# Patient Record
Sex: FEMALE | Race: WHITE | HISPANIC OR LATINO | Employment: FULL TIME | URBAN - METROPOLITAN AREA
[De-identification: names, ages, dates, MRNs, and addresses within clinical notes are randomized per-mention and may not be internally consistent; named-entity substitution may affect disease eponyms.]

---

## 2020-09-04 ENCOUNTER — LAB (OUTPATIENT)
Dept: LAB | Facility: HOSPITAL | Age: 60
End: 2020-09-04

## 2020-09-04 ENCOUNTER — OFFICE VISIT (OUTPATIENT)
Dept: FAMILY MEDICINE CLINIC | Facility: CLINIC | Age: 60
End: 2020-09-04

## 2020-09-04 VITALS
HEIGHT: 63 IN | TEMPERATURE: 97.1 F | WEIGHT: 167 LBS | DIASTOLIC BLOOD PRESSURE: 80 MMHG | BODY MASS INDEX: 29.59 KG/M2 | OXYGEN SATURATION: 98 % | SYSTOLIC BLOOD PRESSURE: 120 MMHG | HEART RATE: 78 BPM

## 2020-09-04 DIAGNOSIS — Z23 NEED FOR IMMUNIZATION AGAINST INFLUENZA: ICD-10-CM

## 2020-09-04 DIAGNOSIS — E66.3 OVERWEIGHT: ICD-10-CM

## 2020-09-04 DIAGNOSIS — I10 ESSENTIAL HYPERTENSION: Primary | ICD-10-CM

## 2020-09-04 LAB
ANION GAP SERPL CALCULATED.3IONS-SCNC: 10.1 MMOL/L (ref 5–15)
BUN SERPL-MCNC: 13 MG/DL (ref 6–20)
BUN/CREAT SERPL: 18.8 (ref 7–25)
CALCIUM SPEC-SCNC: 9.6 MG/DL (ref 8.6–10.5)
CHLORIDE SERPL-SCNC: 105 MMOL/L (ref 98–107)
CO2 SERPL-SCNC: 27.9 MMOL/L (ref 22–29)
CREAT SERPL-MCNC: 0.69 MG/DL (ref 0.57–1)
GFR SERPL CREATININE-BSD FRML MDRD: 87 ML/MIN/1.73
GLUCOSE SERPL-MCNC: 95 MG/DL (ref 65–99)
POTASSIUM SERPL-SCNC: 4.3 MMOL/L (ref 3.5–5.2)
SODIUM SERPL-SCNC: 143 MMOL/L (ref 136–145)

## 2020-09-04 PROCEDURE — 90471 IMMUNIZATION ADMIN: CPT | Performed by: NURSE PRACTITIONER

## 2020-09-04 PROCEDURE — 90715 TDAP VACCINE 7 YRS/> IM: CPT | Performed by: NURSE PRACTITIONER

## 2020-09-04 PROCEDURE — 80048 BASIC METABOLIC PNL TOTAL CA: CPT | Performed by: NURSE PRACTITIONER

## 2020-09-04 PROCEDURE — 99203 OFFICE O/P NEW LOW 30 MIN: CPT | Performed by: NURSE PRACTITIONER

## 2020-09-04 PROCEDURE — 90472 IMMUNIZATION ADMIN EACH ADD: CPT | Performed by: NURSE PRACTITIONER

## 2020-09-04 PROCEDURE — 90686 IIV4 VACC NO PRSV 0.5 ML IM: CPT | Performed by: NURSE PRACTITIONER

## 2020-09-04 RX ORDER — AMLODIPINE BESYLATE 5 MG/1
5 TABLET ORAL DAILY
Qty: 90 TABLET | Refills: 1 | Status: SHIPPED | OUTPATIENT
Start: 2020-09-04 | End: 2020-11-23

## 2020-09-04 RX ORDER — VITAMIN E 268 MG
400 CAPSULE ORAL DAILY
COMMUNITY

## 2020-09-04 RX ORDER — ACETAMINOPHEN 160 MG
2000 TABLET,DISINTEGRATING ORAL DAILY
COMMUNITY

## 2020-09-04 RX ORDER — DIPHENHYDRAMINE HCL 25 MG
25 TABLET ORAL EVERY 6 HOURS PRN
COMMUNITY
End: 2022-04-15

## 2020-09-04 RX ORDER — AMLODIPINE BESYLATE 5 MG/1
5 TABLET ORAL DAILY
COMMUNITY
End: 2020-09-04 | Stop reason: SDUPTHER

## 2020-09-04 RX ORDER — MULTIVITAMIN WITH IRON
TABLET ORAL
COMMUNITY

## 2020-09-04 NOTE — PATIENT INSTRUCTIONS
" (  Plan de alimentación DASH  DASH Eating Plan  DASH es la sigla en inglés de \"Enfoques Alimentarios para Detener la Hipertensión\" (Dietary Approaches to Stop Hypertension). El plan de alimentación DASH ha demostrado bajar la presión arterial elevada (hipertensión). También puede reducir el riesgo de diabetes tipo 2, enfermedad cardíaca y accidente cerebrovascular. Lalitha plan también puede ayudar a adelgazar.  Consejos para seguir lalitha plan    Pautas generales  · Evite ingerir más de 2,300 mg (miligramos) de sal (sodio) por día. Si tiene hipertensión, es posible que necesite reducir la ingesta de sodio a 1,500 mg por día.  · Limite el consumo de alcohol a no más de 1 medida por día si es tremaine y no está embarazada, y 2 medidas por día si es hombre. Judi medida equivale a 12 oz (355 ml) de cerveza, 5 oz (148 ml) de vino o 1½ oz (44 ml) de bebidas alcohólicas de puma graduación.  · Trabaje con hull médico para mantener un peso saludable o perder peso. Pregúntele cuál es el peso recomendado para usted.  · Realice al menos 30 minutos de ejercicio que vivian que se acelere hull corazón (ejercicio aeróbico) la mayoría de los días de la semana. Estas actividades pueden incluir caminar, nadar o andar en bicicleta.  · Trabaje con hull médico o especialista en alimentación y nutrición (nutricionista) para ajustar hull plan alimentario a brendan necesidades calóricas personales.  Lectura de las etiquetas de los alimentos    · Verifique en las etiquetas de los alimentos, la cantidad de sodio por porción. Elija alimentos con menos del 5 por ciento del valor diario de sodio. Generalmente, los alimentos con menos de 300 mg de sodio por porción se encuadran dentro de lalitha plan alimentario.  · Para encontrar cereales integrales, busque la palabra \"integral\" rigo primera palabra en la lista de ingredientes.  De compras  · Compre productos en los que en hull etiqueta diga: “bajo contenido de sodio” o “sin agregado de sal”.  · Compre alimentos frescos. " Evite los alimentos enlatados y comidas precocidas o congeladas.  Cocción  · Evite agregar sal cuando cocine. Use hierbas o aderezos sin sal, en lugar de sal de pritchett o sal eddie. Consulte al médico o farmacéutico antes de usar sustitutos de la sal.  · No fría los alimentos. A la hora de cocinar los alimentos opte por hornearlos, hervirlos, grillarlos y asarlos a la forrest.  · Cocine con aceites cardiosaludables, rigo lipscomb, canola, soja o girasol.  Planificación de las comidas  · Consuma dayna dieta equilibrada, que incluya lo siguiente:  ? 5 o más porciones de frutas y verduras por día. Trate de que la mitad del plato de cada comida zonia frutas y verduras.  ? Hasta 6 u 8 porciones de cereales integrales por día.  ? Menos de 6 onzas de carne, aves o pescado magros por día. Dayna porción de 3 onzas de carne tiene sharla el mismo tamaño que un mahamed de cartas. Un huevo equivale a 1 onza.  ? Dos porciones de productos lácteos descremados por día.  ? Dayna porción de rosita secos, semillas o frijoles 5 veces por semana.  ? Grasas cardiosaludables. Las grasas saludables llamadas ácidos grasos omega-3 se encuentran en alimentos rigo semillas de yanelis y pescados de agua fría, rigo por ejemplo, alex, salmón y caballa.  · Limite la cantidad que ingiere de los siguientes alimentos:  ? Alimentos enlatados o envasados.  ? Alimentos con alto contenido de grasa trans, rigo alimentos fritos.  ? Alimentos con alto contenido de grasa saturada, rigo carne con grasa.  ? Dulces, postres, bebidas azucaradas y otros alimentos con azúcar agregada.  ? Productos lácteos enteros.  · No le agregue sal a los alimentos antes de probarlos.  · Trate de comer al menos 2 comidas vegetarianas por semana.  · Consuma más comida casera y menos de restaurante, de bufés y comida rápida.  · Cuando coma en un restaurante, pida que preparen hull comida con menos sal o, en lo posible, sin nada de sal.  ¿Qué alimentos se recomiendan?  Los alimentos enumerados a  continuación no constituyen dayna lista completa. Hable con el nutricionista sobre las mejores opciones alimenticias para usted.  Cereales  Pan de salvado o integral. Pasta de salvado o integral. Arroz integral. Jakob. Quinua. Pito burgol. Cereales integrales y con bajo contenido de sodio. Pan shana. Galletitas de agua con bajo contenido de grasa y sodio. Tortillas de harina integral.  Verduras  Verduras frescas o congeladas (crudas, al vapor, asadas o grilladas). Jugos de tomate y verduras con bajo contenido de sodio o reducidos en sodio. Salsa y pasta de tomate con bajo contenido de sodio o reducidas en sodio. Verduras enlatadas con bajo contenido de sodio o reducidas en sodio.  Frutas  Todas las frutas frescas, congeladas o disecadas. Frutas enlatadas en jugo natural (sin agregado de azúcar).  Carne y otros alimentos proteicos  Dariusz o pavo sin piel. Carne de dariusz o de pavo molida. Cerdo desgrasado. Pescado y mariscos. Claras de huevo. Porotos, guisantes o lentejas secos. Rosita secos, mantequilla de rosita secos y semillas sin sal. Frijoles enlatados sin sal. Vizcaino de carne vacuna magra, desgrasada. Embutidos magros, con bajo contenido de sodio.  Lácteos  Leche descremada (1 %) o descremada. Quesos sin grasa, con bajo contenido de grasa o descremados. Queso conner o ricota sin grasa, con bajo contenido de sodio. Yogur semidescremado o descremado. Queso con bajo contenido de grasa y sodio.  Grasas y aceites  Margarinas untables que no contengan grasas trans. Aceite vegetal. Mayonesa y aderezos para ensaladas livianos o con bajo contenido de grasas (reducidos en sodio). Aceite de canola, cártamo, lipscomb, soja y girasol. [place].  Condimentos y otros alimentos  Hierbas. Especias. Mezclas de condimentos sin sal. Palomitas de maíz y pretzels sin sal. Dulces con bajo contenido de grasas.  ¿Qué alimentos no se recomiendan?  Los alimentos enumerados a continuación no constituyen dayna lista completa. Hable con el  nutricionista sobre las mejores opciones alimenticias para usted.  Cereales  Productos de panificación hechos con grasa, rigo medialunas, magdalenas y algunos panes. Comidas con arroz o pasta seca listas para usar.  Verduras  Verduras con crema o fritas. Verduras en salsa de queso. Verduras enlatadas regulares (que no zonia con bajo contenido de sodio o reducidas en sodio). Pasta y salsa de tomates enlatadas regulares (que no zonia con bajo contenido de sodio o reducidas en sodio). Jugos de tomate y verduras regulares (que no zonia con bajo contenido de sodio o reducidos en sodio). Pepinillos. [place].  Frutas  Fruta enlatada en almíbar liviano o espeso. Frutas cocidas en aceite. Frutas con salsa de crema o manteca.  Carne y otros alimentos proteicos  Vizcaino de carne con grasa. Costillas. Carne frita. Tocino. Salchichas. Mortadela y otras felix procesadas. Rakesh. Panceta. Perros calientes (hotdogs). Salchicha de cerdo. Jesi secos y semillas con omari. Frijoles enlatados con agregado de omari. Pescado enlatado o ahumado. Huevos enteros o yemas. Dariusz o pavo con piel.  Lácteos  Leche entera o al 2 %, crema y mitad leche y mitad crema. Queso crema entero o con toda hull grasa. Yogur entero o endulzado. Quesos con toda hull grasa. Sustitutos de cremas no lácteas. Coberturas batidas. Quesos para untar y quesos procesados.  Grasas y aceites  Mantequilla. Margarina en leonardo. [place] de cerdo. Materia grasa. Mantequilla clarificada. Grasa de panceta. Aceites tropicales rigo aceite de jeff, palmiste o sanchez.  Condimentos y otros alimentos  Palomitas de maíz y pretzels con sal. Sal de cebolla, sal de ajo, sal condimentada, sal de pritchett y sal eddie. Salsa [facility]. Salsa tártara. Salsa barbacoa. Salsa teriyaki. Salsa de soja, incluso la que tiene contenido reducido de sodio. Salsa de carne. Salsas en jenn y envasadas. Salsa de pescado. Salsa de ostras. Salsa rosada. Rábano picante envasado. [place]. [place]aza. Saborizantes y  tiernizantes para carne. Caldo en cubitos. Salsa picante y salsa tabasco. Escabeches envasados o ya preparados. Aderezos para tacos prefabricados o envasados. Salsas. Aderezos comunes para ensalada.  Dónde encontrar más información:  · Instituto Nacional del [place], los Pulmones y la [place] (National Heart, Lung, and Blood [place]): www.nhlbi.nih.gov  · Asociación Estadoun[place]ense del [place] (American Heart Association): www.heart.org  Resumen  · El plan de alimentación DASH ha demostrado bajar la presión arterial elevada (hipertensión). También puede reducir el riesgo de diabetes tipo 2, enfermedad cardíaca y accidente cerebrovascular.  · Con el plan de alimentación DASH, deberá limitar el consumo de sal (sodio) a 2,300 mg por día. Si tiene hipertensión, es posible que necesite reducir la ingesta de sodio a 1,500 mg por día.  · Cuando siga el plan de alimentación DASH, trate de comer más frutas frescas y verduras, cereales integrales, felix magras, lácteos descremados y grasas cardiosaludables.  · Trabaje con hull médico o especialista en alimentación y nutrición (nutricionista) para ajustar hull plan alimentario a brendan necesidades calóricas personales.  Esta información no tiene rigo fin reemplazar el consejo del médico. Asegúrese de hacerle al médico cualquier pregunta que tenga.  Document Released: 12/06/2012 Document Revised: 04/09/2018 Document Reviewed: 04/09/2018  Elsevier Patient Education © 2020 Elsevier Inc.    Hipertensión en los adultos  Hypertension, Adult  La presión arterial puma (hipertensión) se produce cuando la fuerza de la yolanda bombea a través de las arterias con mucha fuerza. Las arterias son los vasos sanguíneos que transportan la yolanda desde el corazón al ro del cuerpo. La hipertensión hace que el corazón vivian más esfuerzo para bombear yolanda y puede provocar que las arterias se estrechen o endurezcan. La hipertensión no tratada o no controlada puede causar infarto de miocardio,  insuficiencia cardíaca, accidente cerebrovascular, enfermedad renal y otros problemas.  Judi lectura de la presión arterial consta de un número más alto sobre un número más bajo. En condiciones ideales, la presión arterial debe estar por debajo de 120/80. El primer número (“superior”) es la presión sistólica. Es la medida de la presión de las arterias cuando el corazón late. El miquel número (“inferior”) es la presión diastólica. Es la medida de la presión en las arterias cuando el corazón se relaja.  ¿Cuáles son las causas?  Se desconoce la causa exacta de esta afección. Hay algunas afecciones que causan presión arterial puma o están relacionadas con mercedes.  ¿Qué incrementa el riesgo?  Algunos factores de riesgo de hipertensión están bajo hull control. Los siguientes factores pueden hacer que sea más propenso a desarrollar esta afección:  · Fumar.  · Tener diabetes mellitus tipo 2, colesterol alto, o ambos.  · No hacer la cantidad suficiente de actividad física o ejercicio.  · Tener sobrepeso.  · Consumir mucha grasa, azúcar, calorías o sal (sodio) en hull dieta.  · Beber alcohol en exceso.  Algunos factores de riesgo para la presión arterial puma pueden ser difíciles o imposibles de cambiar. Algunos de estos factores son los siguientes:  · Tener enfermedad renal crónica.  · Tener antecedentes familiares de presión arterial puma.  · Edad. Los riesgos aumentan con la edad.  · Rubin. El riesgo es mayor para las personas afroamericanas.  · Sexo. Antes de los 45 años, los hombres corren más riesgo que las mujeres. Después de los 65 años, las mujeres corren más riesgo que los hombres.  · Tener apnea obstructiva del sueño.  · Estrés.  ¿Cuáles son los signos o los síntomas?  Es posible que la presión arterial puma puede no cause síntomas. La presión arterial muy puma (crisis hipertensiva) puede provocar:  · Dolor de zuly.  · Ansiedad.  · Falta de aire.  · Hemorragia nasal.  · Náuseas y vómitos.  · Cambios en la  visión.  · Dolor de pecho intenso.  · Convulsiones.  ¿Cómo se diagnostica?  Esta afección se diagnostica al medir hull presión arterial mientras se encuentra sentado, con el brazo apoyado sobre dayna superficie plana, las piernas sin cruzar y los pies unique apoyados en el piso. El brazalete del tensiómetro debe colocarse directamente sobre la piel de la parte superior del brazo y al nivel de hull corazón. Debe medirla al menos dos veces en el mismo brazo. Determinadas condiciones pueden causar dayna diferencia de presión arterial entre el brazo laurel y el derecho.  Ciertos factores pueden provocar que las lecturas de la presión arterial zonia inferiores o superiores a lo normal por un período corto de tiempo:  · Si hull presión arterial es más puma cuando se encuentra en el consultorio del médico que cuando la mide en hull hogar, se denomina “hipertensión de bata mj”. La mayoría de las personas que tienen esta afección no deben ser medicadas.  · Si hull presión arterial es más puma en el hogar que cuando se encuentra en el consultorio del médico, se denomina “hipertensión enmascarada”. La mayoría de las personas que tienen esta afección deben ser medicadas para controlar la presión arterial.  Si tiene dayna lecturas de presión arterial puma dennis dayna visita o si tiene presión arterial normal con otros factores de riesgo, se le podrá pedir que vivian lo siguiente:  · Que regrese otro día para volver a controlar hull presión arterial nuevamente.  · Que se controle la presión arterial en hull casa dennis 1 semana o más.  Si se le diagnostica hipertensión, es posible que se le realicen otros análisis de yolanda o estudios de diagnóstico por imágenes para ayudar a hull médico a comprender hull riesgo general de tener otras afecciones.  ¿Cómo se trata?  Esta afección se trata haciendo cambios saludables en el estilo de veda, tales rigo ingerir alimentos saludables, realizar más ejercicio y reducir el consumo de alcohol. El médico puede  recetarle medicamentos si los cambios en el estilo de veda no son suficientes para lograr controlar la presión arterial y si:  · Hull presión arterial sistólica está por encima de 130.  · Hull presión arterial diastólica está por encima de 80.  La presión arterial deseada puede variar en función de las enfermedades, la edad y otros factores personales.  Siga estas instrucciones en hull casa:  Comida y bebida    · Siga dayna dieta con alto contenido de fibras y potasio, y con bajo contenido de sodio, azúcar agregada y grasas. Un ejemplo de plan alimenticio es la dieta DASH (Dietary Approaches to Stop Hypertension, Métodos alimenticios para detener la hipertensión). Para alimentarse de esta manera:  ? Coma mucha fruta y verdura fresca. Trate de que la mitad del plato de cada comida sea de frutas y verduras.  ? Coma cereales integrales, rigo pasta integral, arroz integral o pan integral. Llene aproximadamente un cuarto del plato con cereales integrales.  ? Coma y mohan productos lácteos con bajo contenido de grasa, rigo leche descremada o yogur bajo en grasas.  ? Evite la ingesta de diehl de carne grasa, carne procesada o curada, y carne de ave con piel. Llene aproximadamente un cuarto del plato con proteínas magras, rigo pescado, trisotn sin piel, frijoles, huevos o tofu.  ? Evite ingerir alimentos prehechos y procesados. En general, estos tienen mayor cantidad de sodio, azúcar agregada y grasa.  · Reduzca hull ingesta diaria de sodio. La mayoría de las personas que tienen hipertensión deben comer menos de 1500 mg de sodio por día.  · No mohan alcohol si:  ? Hull médico le indica no hacerlo.  ? Está embarazada, puede estar embarazada o está tratando de quedar embarazada.  · Si hattie alcohol:  ? Limite la cantidad que hattie a lo siguiente:  § De 0 a 1 medida por día para las mujeres.  § De 0 a 2 medidas por día para los hombres.  ? Esté atento a la cantidad de alcohol que hay en las bebidas que erick. En los [place], dayna medida  equivale a dayna botella de cerveza de 12 oz (355 ml), un vaso de vino de 5 oz (148 ml) o un vaso de dayna bebida alcohólica de puma graduación de 1½ oz (44 ml).  Estilo de veda    · Trabaje con hull médico para mantener un peso saludable o perder peso. Pregúntele cuál es el peso recomendado para usted.  · Cinthia al menos 30 minutos de ejercicio la mayoría de los días de la semana. Estas actividades pueden incluir caminar, nadar o andar en bicicleta.  · Incluya ejercicios para fortalecer brendan músculos (ejercicios de resistencia), rigo [place] o levantamiento de pesas, rigo parte de hull rutina semanal de ejercicios. Intente realizar 30 minutos de nanci tipo de ejercicios al menos agusto días a la semana.  · No consuma ningún producto que contenga nicotina o tabaco, rigo cigarrillos, cigarrillos electrónicos y tabaco de mascar. Si necesita ayuda para dejar de fumar, consulte al médico.  · Contrólese la presión arterial en hull casa según las indicaciones del médico.  · Concurra a todas las visitas de seguimiento rigo se lo haya indicado el médico. [place] es importante.  Medicamentos  · [place] los medicamentos de venta abdiaziz y los recetados solamente rigo se lo haya indicado el médico. Siga cuidadosamente las indicaciones. Los medicamentos para la presión arterial deben tomarse según las indicaciones.  · No omita las dosis de medicamentos para la presión arterial. Si lo hace, estará en riesgo de tener problemas y puede hacer que los medicamentos zonia menos eficaces.  · Pregúntele a hull médico a qué efectos secundarios o reacciones a los medicamentos debe prestar atención.  Comuníquese con un médico si:  · Piensa que tiene dayna reacción a un medicamento que está tomando.  · Tiene kim de zuly frecuentes (recurrentes).  · Se siente mareado.  · Tiene hinchazón en los tobillos.  · Tiene problemas de visión.  Solicite ayuda inmediatamente si:  · Siente un dolor de zuly intenso o confusión.  · Siente debilidad inusual o  adormecimiento.  · Siente que va a desmayarse.  · Siente un dolor intenso en el pecho o el abdomen.  · Vomita repetidas veces.  · Tiene dificultad para respirar.  Resumen  · La hipertensión se produce cuando la yolanda bombea en las arterias con mucha fuerza. Si esta afección no se controla, podría correr riesgo de tener complicaciones graves.  · La presión arterial deseada puede variar en función de las enfermedades, la edad y otros factores personales. Para la mayoría de las personas, dayna presión arterial normal es wolfgang que 120/80.  · La hipertensión se trata con cambios en el estilo de veda, medicamentos o dayna combinación de ambos. Los cambios en el estilo de veda incluyen pérdida de peso, ingerir alimentos sanos, seguir dayna dieta baja en sodio, hacer más ejercicio y limitar el consumo de alcohol.  Esta información no tiene rigo fin reemplazar el consejo del médico. Asegúrese de hacerle al médico cualquier pregunta que tenga.  Document Released: 12/18/2006 Document Revised: 10/03/2019 Document Reviewed: 10/03/2019  Elsevier Patient Education © 2020 Elsevier Inc.

## 2020-09-04 NOTE — PROGRESS NOTES
Chief Complaint   Patient presents with   • Establish Care   • Varicose Veins       Subjective   Carlota Santana is a 59 y.o. female    History of Present Illness  Patient today to establish care. She has a HX of bariatric surgery, was pre-diabetic, now has been better. Has a Hx of HTN, takes Norvasc 5 mg QD.     No Known Allergies  Past Medical History:   Diagnosis Date   • Hypertension       Past Surgical History:   Procedure Laterality Date   • GASTRIC BYPASS       Social History     Socioeconomic History   • Marital status:      Spouse name: Not on file   • Number of children: Not on file   • Years of education: Not on file   • Highest education level: Not on file   Tobacco Use   • Smoking status: Never Smoker   • Smokeless tobacco: Never Used   Substance and Sexual Activity   • Alcohol use: Yes   • Drug use: Not Currently        Current Outpatient Medications:   •  amLODIPine (NORVASC) 5 MG tablet, Take 1 tablet by mouth Daily., Disp: 90 tablet, Rfl: 1  •  Ascorbic Acid Buffered (BUFFERED VITAMIN C) 1000 MG capsule, Take  by mouth., Disp: , Rfl:   •  Calcium 600-200 MG-UNIT per tablet, Take 1 tablet by mouth Daily., Disp: , Rfl:   •  Cholecalciferol (VITAMIN D3) 50 MCG (2000 UT) capsule, Take 2,000 Units by mouth Daily., Disp: , Rfl:   •  diphenhydrAMINE (Benadryl Allergy) 25 MG tablet, Take 25 mg by mouth Every 6 (Six) Hours As Needed for Itching., Disp: , Rfl:   •  Magnesium 250 MG tablet, Take  by mouth., Disp: , Rfl:   •  Multiple Vitamins-Minerals (MULTIVITAMIN ADULT EXTRA C PO), Take  by mouth., Disp: , Rfl:   •  vitamin E 400 UNIT capsule, Take 400 Units by mouth Daily., Disp: , Rfl:      Review of Systems   Constitutional: Negative for chills, fatigue and unexpected weight change.   Respiratory: Negative for cough, chest tightness, shortness of breath and wheezing.    Cardiovascular: Positive for leg swelling. Negative for chest pain and palpitations.   Gastrointestinal: Negative for constipation,  diarrhea, nausea and vomiting.   Genitourinary: Negative for difficulty urinating and dysuria.   Skin: Negative for color change and rash.   Neurological: Negative for dizziness, syncope, weakness and headaches.   Psychiatric/Behavioral: Negative for sleep disturbance.       Objective     Vitals:    09/04/20 1530   BP: 120/80   Pulse: 78   Temp: 97.1 °F (36.2 °C)   SpO2: 98%       No results found for this or any previous visit.    Physical Exam   Constitutional: She is oriented to person, place, and time. She appears well-developed and well-nourished.   HENT:   Head: Normocephalic and atraumatic.   Cardiovascular: Normal rate, regular rhythm and normal heart sounds.   Pulmonary/Chest: Effort normal and breath sounds normal.   Musculoskeletal: She exhibits no edema.   Neurological: She is alert and oriented to person, place, and time.   Skin: Skin is warm and dry.   Psychiatric: She has a normal mood and affect. Her behavior is normal.   Vitals reviewed.      Assessment/Plan   Problem List Items Addressed This Visit        Cardiovascular and Mediastinum    Essential hypertension - Primary    Relevant Medications    amLODIPine (NORVASC) 5 MG tablet    Other Relevant Orders    Basic Metabolic Panel       Other    Overweight      Other Visit Diagnoses     Need for immunization against influenza        Relevant Orders    Fluarix/Fluzone/Afluria Quad>6 Months (Completed)      Patient instructed to check blood pressure daily, record, and bring to next visit. If the readings run 140/90 or greater, the patient is to call my office or return sooner for evaluation. Pt advised to eat a heart healthy diet and get regular aerobic exercise.  We will check a basic metabolic panel today.  This is okay we will consider switching her blood pressure medication to an ace inhibitor or angiotensin receptor blocker.    Discussed need for weight management. Discussed weight loss with diet, recommend Weight Watchers or Mediterranean Diet,  but stated that whatever method works for this patient is best. Reviewed need for regular exercise.  The patient agrees with all recommendations at this time. I have discussed a weight loss plan to be determined by this patient.     RV- 3 mo or PRN    Counseling provided on hypertension.    Rehan Ramirez, APRN   9/4/2020   16:10

## 2020-11-23 ENCOUNTER — OFFICE VISIT (OUTPATIENT)
Dept: FAMILY MEDICINE CLINIC | Facility: CLINIC | Age: 60
End: 2020-11-23

## 2020-11-23 ENCOUNTER — RESULTS ENCOUNTER (OUTPATIENT)
Dept: FAMILY MEDICINE CLINIC | Facility: CLINIC | Age: 60
End: 2020-11-23

## 2020-11-23 VITALS
SYSTOLIC BLOOD PRESSURE: 140 MMHG | BODY MASS INDEX: 30.65 KG/M2 | OXYGEN SATURATION: 99 % | TEMPERATURE: 96.9 F | HEART RATE: 57 BPM | DIASTOLIC BLOOD PRESSURE: 80 MMHG | WEIGHT: 173 LBS | HEIGHT: 63 IN

## 2020-11-23 DIAGNOSIS — M77.01 MEDIAL EPICONDYLITIS OF RIGHT ELBOW: ICD-10-CM

## 2020-11-23 DIAGNOSIS — Z11.59 NEED FOR HEPATITIS C SCREENING TEST: ICD-10-CM

## 2020-11-23 DIAGNOSIS — Z12.11 SCREEN FOR COLON CANCER: ICD-10-CM

## 2020-11-23 DIAGNOSIS — E55.9 VITAMIN D DEFICIENCY: ICD-10-CM

## 2020-11-23 DIAGNOSIS — Z00.00 WELLNESS EXAMINATION: Primary | ICD-10-CM

## 2020-11-23 DIAGNOSIS — I10 ESSENTIAL HYPERTENSION: ICD-10-CM

## 2020-11-23 DIAGNOSIS — E66.3 OVERWEIGHT: ICD-10-CM

## 2020-11-23 DIAGNOSIS — E78.5 DYSLIPIDEMIA: ICD-10-CM

## 2020-11-23 PROCEDURE — 99396 PREV VISIT EST AGE 40-64: CPT | Performed by: NURSE PRACTITIONER

## 2020-11-23 RX ORDER — LOSARTAN POTASSIUM 100 MG/1
100 TABLET ORAL DAILY
Qty: 30 TABLET | Refills: 2 | Status: SHIPPED | OUTPATIENT
Start: 2020-11-23 | End: 2021-02-01

## 2020-11-23 NOTE — PROGRESS NOTES
Patient Care Team:  Rehan Ramirez APRN as PCP - General (Family Medicine)     Chief complaint: Patient is in today for a physical     Patient is a 59 y.o. female who presents for her yearly physical exam.     HPI patient today with complaints of swelling also for the past 3 weeks.  She reports she is on a medium amount of salt in her diet.  She has not been currently checking her blood pressure at home.  Her weight is up 6 pounds from the last visit.    Review of Systems   Constitutional: Negative for appetite change, chills, fatigue and fever.   HENT: Negative for congestion, ear pain, hearing loss, rhinorrhea, sinus pressure and sore throat.    Eyes: Negative for itching and visual disturbance (glasses).        Last ophthalmology appointment was 6 months ago   Respiratory: Negative for cough, shortness of breath and wheezing.    Cardiovascular: Positive for leg swelling. Negative for chest pain and palpitations.   Gastrointestinal: Negative for abdominal pain, blood in stool, constipation, diarrhea, nausea and vomiting.   Endocrine: Negative for cold intolerance, heat intolerance, polydipsia, polyphagia and polyuria.   Genitourinary: Negative for difficulty urinating, dysuria and hematuria. Menstrual problem:  Last menstrual period was 7 years ago.   Musculoskeletal: Positive for arthralgias (elbows, bilateral epicondylitis). Negative for back pain, joint swelling, myalgias and neck pain.   Skin: Negative for rash and wound.   Allergic/Immunologic: Negative for environmental allergies and food allergies.   Neurological: Negative for dizziness, light-headedness, numbness and headaches.   Hematological: Negative for adenopathy. Does not bruise/bleed easily.   Psychiatric/Behavioral: Negative for dysphoric mood and sleep disturbance. The patient is not nervous/anxious.       History  Past Medical History:   Diagnosis Date   • Hypertension       Past Surgical History:   Procedure Laterality Date   • GASTRIC  BYPASS        No Known Allergies   History reviewed. No pertinent family history.  Social History     Socioeconomic History   • Marital status:      Spouse name: Not on file   • Number of children: Not on file   • Years of education: Not on file   • Highest education level: Not on file   Tobacco Use   • Smoking status: Never Smoker   • Smokeless tobacco: Never Used   Substance and Sexual Activity   • Alcohol use: Yes   • Drug use: Not Currently        Current Outpatient Medications:   •  Ascorbic Acid Buffered (BUFFERED VITAMIN C) 1000 MG capsule, Take  by mouth., Disp: , Rfl:   •  Calcium 600-200 MG-UNIT per tablet, Take 1 tablet by mouth Daily., Disp: , Rfl:   •  Cholecalciferol (VITAMIN D3) 50 MCG (2000 UT) capsule, Take 2,000 Units by mouth Daily., Disp: , Rfl:   •  diphenhydrAMINE (Benadryl Allergy) 25 MG tablet, Take 25 mg by mouth Every 6 (Six) Hours As Needed for Itching., Disp: , Rfl:   •  Magnesium 250 MG tablet, Take  by mouth., Disp: , Rfl:   •  Multiple Vitamins-Minerals (MULTIVITAMIN ADULT EXTRA C PO), Take  by mouth., Disp: , Rfl:   •  vitamin E 400 UNIT capsule, Take 400 Units by mouth Daily., Disp: , Rfl:   •  losartan (COZAAR) 100 MG tablet, Take 1 tablet by mouth Daily., Disp: 30 tablet, Rfl: 2    Immunizations   N/A   Prescribed/Refused   Date     Td/Tdap  (Booster Q 10 yrs)   [x]           Prescribed    []     Refused        []           Flu  (Yearly)   [x]        Prescribed    []     Refused        []           Pneumovax  (1 yr after Prevnar)   [x]        Prescribed    []     Refused        []           Prevnar 13  (1 yr after Pneumo)   [x]        Prescribed    []     Refused        []           Hep B     [x]        Prescribed    []     Refused        []           Shingles  (Age 50 and older)   [x]        Prescribed    []     Refused        []      Getting second dose soon     Immunization History   Administered Date(s) Administered   • Flulaval/Fluarix/Fluzone Quad 09/04/2020   •  Shingrix 09/10/2020   • Tdap 2020     Health Maintenance   Topic Date Due   • COLONOSCOPY  1960   • ANNUAL PHYSICAL  1964   • HEPATITIS C SCREENING  2020   • ZOSTER VACCINE (2 of 2) 2020   • MAMMOGRAM  2022   • PAP SMEAR  10/16/2022   • TDAP/TD VACCINES (2 - Td) 2030   • INFLUENZA VACCINE  Completed   • Pneumococcal Vaccine 0-64  Aged Out        Diabetes  [] Yes  [x] No   N/A      Date     Eye Exam     []             []   Complete     []   Recommended Date:  Where:       Foot Exam     []         []   Complete          Obesity Counseling     []       []   Complete     No results found for: HGBA1C, MICROALBUR    Additional Testing      Date     Colorectal Screening       []   N/A   []   Complete    [x]   Ordered     Date:    Where:       Pap      []   N/A   []   Complete   [x]   OB/GYN Date:    Where:       Mammogram        []   N/A   [x]   Complete   []   Ordered Date:    Where:     PSA  (Over age 50)    [x]   N/A   []   Complete   []   Ordered Date:    Where:     US Aorta  (For male smokers, age 65)     [x]   N/A   []   Complete   []   Ordered Date:    Where:     CT for Smoker  (Age 55-75, 30 pk yr)    [x]   N/A   []   Complete   []   Ordered Date:    Where:     Bone Density/DEXA      [x]   N/A   []   Complete   []   Ordered Date:    Follow-up:     Hep. C  ( 1277-5585)      []   N/A   []   Complete   [x]   Ordered Date:    Where:     Results for orders placed or performed in visit on 20   Basic Metabolic Panel    Specimen: Blood   Result Value Ref Range    Glucose 95 65 - 99 mg/dL    BUN 13 6 - 20 mg/dL    Creatinine 0.69 0.57 - 1.00 mg/dL    Sodium 143 136 - 145 mmol/L    Potassium 4.3 3.5 - 5.2 mmol/L    Chloride 105 98 - 107 mmol/L    CO2 27.9 22.0 - 29.0 mmol/L    Calcium 9.6 8.6 - 10.5 mg/dL    eGFR Non African Amer 87 >60 mL/min/1.73    BUN/Creatinine Ratio 18.8 7.0 - 25.0    Anion Gap 10.1 5.0 - 15.0 mmol/L            /80   Pulse 57   Temp 96.9 °F  "(36.1 °C)   Ht 160.7 cm (63.27\")   Wt 78.5 kg (173 lb)   SpO2 99%   BMI 30.38 kg/m²       Physical Exam  Vitals signs reviewed.   Constitutional:       Appearance: She is well-developed.   HENT:      Head: Normocephalic and atraumatic.      Right Ear: External ear normal.      Left Ear: External ear normal.   Eyes:      Pupils: Pupils are equal, round, and reactive to light.   Neck:      Musculoskeletal: Normal range of motion and neck supple.      Thyroid: No thyromegaly.      Vascular: No JVD.   Cardiovascular:      Rate and Rhythm: Normal rate and regular rhythm.      Heart sounds: Normal heart sounds.   Pulmonary:      Effort: Pulmonary effort is normal. No retractions.      Breath sounds: Normal breath sounds.   Chest:      Chest wall: No mass, lacerations, deformity, swelling, tenderness, crepitus or edema.      Breasts: Breasts are symmetrical.     Abdominal:      General: Bowel sounds are normal. There is no distension.      Palpations: Abdomen is soft.      Tenderness: There is no abdominal tenderness. There is no guarding.   Genitourinary:     Comments: deferred  Musculoskeletal: Normal range of motion.      Right lower leg: Edema (trace) present.      Left lower leg: Edema (trace) present.   Lymphadenopathy:      Cervical: No cervical adenopathy.   Skin:     General: Skin is warm and dry.      Findings: No erythema or rash.   Neurological:      Mental Status: She is alert and oriented to person, place, and time.   Psychiatric:         Behavior: Behavior normal.             Counseling provided on weight management and hypertension.    Diagnoses and all orders for this visit:    Wellness examination  -     Comprehensive Metabolic Panel; Future  -     Lipid Panel; Future  -     Vitamin D 25 Hydroxy; Future  -     Hepatitis C Antibody; Future    Essential hypertension  -     Comprehensive Metabolic Panel; Future  -     losartan (COZAAR) 100 MG tablet; Take 1 tablet by mouth Daily.    Overweight    Vitamin " D deficiency  -     Vitamin D 25 Hydroxy; Future    Dyslipidemia  -     Lipid Panel; Future    Screen for colon cancer  -     Cancel: Ambulatory Referral For Screening Colonoscopy  -     Cologuard - Stool, Per Rectum; Future    Need for hepatitis C screening test  -     Hepatitis C Antibody; Future    Medial epicondylitis of right elbow    The wellness exam has been reviewed in detail.  The patient has been fully counseled on preventative guidelines for vaccines, cancer screenings, and other health maintenance needs.  Functional testing has been performed to assess capacity for independent living and need for other medical interventions.   The patient was counseled on maintaining a lifestyle to promote good health and to minimize chronic diseases.  The patient has been assisted with scheduling healthcare procedures for the coming year and given a written document outlining these recommendations.     For high blood pressure we will change her amlodipine to losartan 100 mg daily. Patient instructed to check blood pressure daily, record, and bring to next visit. If the readings run 140/90 or greater, the patient is to call my office or return sooner for evaluation. Pt advised to eat a heart healthy diet and get regular aerobic exercise.    Discussed need for weight management. Discussed weight loss with diet, recommend Weight Watchers or Mediterranean Diet, but stated that whatever method works for this patient is best. Reviewed need for regular exercise.  The patient agrees with all recommendations at this time. I have discussed a weight loss plan to be determined by this patient.     Will obtain routine labs today and make further recommendations pending results.     For medial epicondylitis I recommend stretches as well as ice and anti-inflammatories.  I have demonstrated to her some of the stretches. Patient was encouraged to keep me informed of any acute changes, lack of improvement, or any new concerning  symptoms.  If patient becomes concerned, or has any worsening symptoms, they are to report either here, urgent treatment, or emergency room. Plan of care discussed with pt. They verbalized understanding and agreement.     RV- 2 weeks    Rehan Ramirez, APRN   11/23/2020   15:57 EST

## 2020-11-23 NOTE — PATIENT INSTRUCTIONS
Colonoscopia en los adultos  Colonoscopy, Adult  Judi colonoscopia es un procedimiento que se realiza para examinar todo el intestino grueso. Lalitha procedimiento se realiza mediante el uso de un tubo fany, joyce y flexible que tiene judi cámara en el extremo.  Es posible que necesite judi colonoscopía:  · [place] parte de las pruebas normales de detección de cáncer colorrectal.  · Si tiene ciertos síntomas rigo:  ? Recuento bajo de glóbulos rojos en la yolanda (anemia).  ? Diarrea que no se resuelve.  ? Dolor en el abdomen.  ? Yolanda en las heces.  Judi colonoscopía puede ayudar a detectar y diagnosticar los siguientes problemas médicos, entre otros:  · Tumores.  · Crecimiento de tejido en la pam donde se forma la mucosidad (pólipos).  · Inflamación.  · Zonas de hemorragias.  Informe al médico sobre:  · Cualquier alergia que tenga.  · Todos los medicamentos que usa, incluidos vitaminas, hierbas, gotas oftálmicas, cremas y medicamentos de venta abdiaziz.  · Cualquier problema previo que usted o algún miembro de hull anika hayan tenido con los anestésicos.  · Cualquier trastorno de la yolanda que tenga.  · Cirugías a las que se haya sometido.  · Cualquier afección médica que tenga.  · Cualquier problema que haya tenido con brendan deposiciones.  · Si está embarazada o podría estarlo.  ¿Cuáles son los riesgos?  En general, se trata de un procedimiento seguro. Sin embargo, pueden ocurrir complicaciones, por ejemplo:  · Sangrado.  · Daño intestinal.  · Reacciones alérgicas a los medicamentos administrados dennis el procedimiento.  · Infección. [place] es poco frecuente.  ¿Qué ocurre antes del procedimiento?  Restricciones en las comidas y bebidas  Siga las indicaciones del médico respecto de las restricciones para las comidas o las bebidas, las cuales pueden incluir lo siguiente:  · Unos días antes del procedimiento:  ? Siga judi dieta con bajo contenido de fibra.  ? Evite los rosita secos, las semillas, las frutas pasas, las frutas crudas  y las verduras.  · Entre 1 y 3 días antes del procedimiento:  ? Coma solo postre de gelatina o helados de agua.  ? Mohan solamente líquidos lena, rigo agua, jugo lidia, caldo o sopa myrna, café ken o té, refrescos o bebidas deportivas.  ? Evite los líquidos que contengan colorantes rojos o morados.  · El día del procedimiento:  ? No coma alimentos sólidos. Puede continuar bebiendo líquidos lena hasta 2 horas antes del procedimiento.  ? No coma ni mohan nada a partir de 2 horas antes del procedimiento o dennis el lapso de tiempo que el médico le recomiende.  Preparado intestinal  Si le recetaron un preparado intestinal para jana por la boca (por vía oral) para limpiar el colon:  · Tómelo rigo se lo haya indicado el médico. Desde el día anterior al procedimiento, tendrá que beber dayna gran cantidad de un medicamento líquido. El líquido hará que tenga muchas deposiciones con heces blandas hasta que las heces se tornen sharla claras o de color guy lidia.  · Si la piel o la abertura entre las nalgas (ano) se irritan debido a la diarrea, puede aliviar la irritación utilizando:  ? Toallitas que contengan medicamentos, por ejemplo toallitas húmedas para adultos con aloe y vitamina E.  ? Un producto para suavizar la piel, rigo vaselina.  · Si vomita mientras erick el preparado intestinal:  ? Cinthia dayna pausa dennis 60 minutos rigo suleman.  ? Comience a jana el preparado nuevamente.  ? Llame al médico si continúa vomitando o no puede jana el preparado sin vomitar.  · Para limpiarle el colon, también pueden darle:  ? Medicamentos laxantes. Estos ayudan a tener deposiciones.  ? Instrucciones para el uso de un enema. Un enema es un medicamento líquido que se inyecta en el recto.  Medicamentos  Consulte al médico sobre:  · Cambiar o suspender brendan medicamentos o suplementos. [place] es muy importante si erick suplementos de roldan, medicamentos para la diabetes o anticoagulantes.  · Jana medicamentos rigo aspirina e ibuprofeno.  Estos medicamentos pueden tener un efecto anticoagulante en la yolanda. No tome estos medicamentos a menos que el médico se lo indique.  · Usar medicamentos de venta abdiaziz, vitaminas, hierbas y suplementos.  Instrucciones generales  · Pregúntele al médico qué medidas se tomarán para ayudar a prevenir dayna infección. Estas pueden incluir linda la piel con un jabón antiséptico.  · Prepare que alguien lo lleve a hull casa desde el hospital o la clínica.  ¿Qué ocurre dennis el procedimiento?    · Le colocarán dayna vía intravenosa en dayna vena.  · Pueden administrarle tova o más de los siguientes medicamentos:  ? Un medicamento para ayudar a relajarse (sedante).  ? Un medicamento para adormecer la pam (anestesia local).  ? Un medicamento que lo hará dormir (anestesia general). [place] debe realizarse en contadas ocasiones.  · Se recostará de costado con las rodillas flexionadas.  · El tubo:  ? Estará recubierto de aceite o gel (estará lubricado).  ? Se colocará a través del ano.  ? Se moverá suavemente a lo fany de todas las partes del intestino grueso.  · Le aplicarán aire en el colon para mantenerlo abierto. [place] puede causar algo de presión o calambres.  · Con dayna cámara, se tomarán imágenes que aparecerán en dayna pantalla.  · Pueden extraerle dayna pequeña muestra de tejido para analizarla con un microscopio (biopsia). El tejido se enviará a un laboratorio para ser analizado si se detectan signos de problemas.  · Si se encuentran pequeños pólipos, pueden extirparse y analizarse para detectar células cancerosas.  · Cuando el procedimiento haya finalizado, se retirará el tubo.  El procedimiento puede variar según el médico y el hospital.  ¿Qué ocurre después del procedimiento?  · Le controlarán la presión arterial, la frecuencia cardíaca, la frecuencia respiratoria y el nivel de oxígeno en la yolanda hasta que le den el puma del hospital o la clínica.  · Es posible que encuentre dayna pequeña cantidad de yolanda en la materia  fecal.  · Es posible que tenga gases y cólicos leves o distensión en el abdomen. [place] se produce a causa del aire que se usó para abrir el colon dennis el procedimiento.  · No conduzca dennis 24 horas después del procedimiento.  · Es hull responsabilidad retirar los resultados del procedimiento. Pregúntele a hull médico, o a un miembro del personal del departamento donde se realice el procedimiento, cuándo estarán listos los resultados.  Resumen  · Dayna colonoscopia es un procedimiento que se realiza para examinar todo el intestino grueso.  · Siga las indicaciones del médico con respecto a lo que puede comer y beber antes del procedimiento.  · Si le recetaron un preparado intestinal por vía oral para limpiar el colon, tómelo rigo se lo haya indicado el médico.  · Dennis la colonoscopía, un tubo flexible con dayna cámara en hull extremo se introduce en el ano y se hace avanzar por otras partes del intestino grueso.  Esta información no tiene rigo fin reemplazar el consejo del médico. Asegúrese de hacerle al médico cualquier pregunta que tenga.  Document Released: 09/27/2006 Document Revised: 09/02/2020 Document Reviewed: 09/02/2020  Elsevier Patient Education © 2020 [employer]    Rehabilitación del codo de golfista  [facility]  Pregunte al médico qué ejercicios son seguros para usted. Cinthia los ejercicios exactamente rigo se lo haya indicado el médico y gradúelos rigo se lo hayan indicado. Es normal sentir un estiramiento leve, tironeo, opresión o malestar al hacer estos ejercicios. Deténgase de inmediato si siente un dolor repentino o el dolor empeora. No comience a hacer estos ejercicios hasta que se lo indique el médico.  Ejercicios de elongación y amplitud de movimiento  Estos ejercicios calientan los músculos y las articulaciones, y mejoran la movilidad y la flexibilidad del codo.  Extensión de la hema    1. Extienda el codo laurel/derecho hacia adelante, con la sanchez hacia arriba en dirección al  techo.  ? Si el médico se lo ha indicado, flexione el codo laurel/derecho en un ángulo de 90 grados (ángulo recto) al costado del cuerpo.  2. Con la otra mano, lleve suavemente la mano izquierda/derecha, y los dedos, hacia abajo en dirección al suelo (extensión). Deténgase al sentir un estiramiento suave en el lado del antebrazo correspondiente a la sanchez.  3. Mantenga esta posición dennis ___________ segundos.  Repita __________ veces. Realice nanci ejercicio __________ veces al día.  Flexión de la hema    1. Extienda el codo laurel/derecho hacia adelante, con la sanchez hacia abajo en dirección al suelo.  ? Si el médico se lo ha indicado, flexione el codo laurel/derecho en un ángulo de 90 grados (ángulo recto) al costado del cuerpo.  2. Con la otra mano, empuje suavemente la parte posterior de la mano izquierda/derecha de modo que los dedos apunten hacia el suelo (flexión). Deténgase al sentir un estiramiento suave en la parte posterior del antebrazo.  3. Mantenga esta posición dennis ___________ segundos.  Repita __________ veces. Realice nanci ejercicio __________ veces al día.  Rotación del antebrazo, supinación  1. Siéntese o póngase de pie con los codos a los lados del cuerpo.  2. Flexione el codo laurel/derecho en un ángulo de 90 grados (ángulo recto).  3. Con la mano no lesionada, gire la sanchez izquierda/derecha hacia arriba en dirección al techo (supinación) hasta sentir un estiramiento suave en la parte interna del antebrazo.  4. Mantenga esta posición dennis ___________ segundos.  Repita __________ veces. Realice nanci ejercicio __________ veces al día.  Rotación del antebrazo, pronación  1. Siéntese o póngase de pie con los codos a los lados del cuerpo.  2. Flexione el codo laurel/derecho en un ángulo de 90 grados (ángulo recto).  3. Con la mano no lesionada, gire la sanchez izquierda/derecha hacia abajo en dirección al suelo (pronación) hasta sentir un estiramiento suave en la parte  superior del antebrazo.  4. Mantenga esta posición dennis ___________ segundos.  Repita __________ veces. Realice nanci ejercicio __________ veces al día.  Ejercicios de fortalecimiento  Estos ejercicios fortalecen el codo y le otorgan resistencia. La resistencia es la capacidad de usar los músculos dennis un tiempo prolongado, incluso después de que se cansen.  Flexión de la hema    1. Siéntese con el antebrazo laurel/derecho apoyado en dayna pritchett u otra superficie y la sanchez hacia arriba en dirección al techo. Deje que la hema izquierda/derecha se extienda sobre el borde de la superficie.  2. Sostenga dayna pesa de __________ o dayna nance de goma para ejercicios.  ? Si utiliza un tubo o dayna nance de goma para ejercicios, sostenga el otro extremo del tubo con la otra mano.  3. Flexione lentamente la hema de modo que la mano apunte hacia el techo (flexión). Trate de  solamente la hema, sin  el ro del brazo.  4. Mantenga esta posición dennis ___________ segundos.  5. Vuelva lentamente a la posición inicial.  Repita __________ veces. Realice nanci ejercicio __________ veces al día.  Flexión de la hema, ejercicio excéntrico  1. Siéntese con la sanchez y el antebrazo laurel/derecho hacia arriba, apoyado en dayna pritchett u otra superficie. Deje que la hema izquierda/derecha se extienda sobre el borde de la superficie.  2. Sostenga dayna pesa de __________ o dayna nance de goma para ejercicios en la mano izquierda/derecha.  ? Si utiliza un tubo o dayna nance de goma para ejercicios, sostenga el otro extremo del tubo con la otra mano.  3. Use la mano no lesionada para subir la mano izquierda/derecha hacia el techo.  4. Retire la mano no lesionada y vuelva lentamente a la posición inicial solo la mano izquierda/derecha (flexión excéntrica).  Repita __________ veces. Realice nanci ejercicio __________ veces al día.  Rotación del antebrazo, pronación  Para realizar nanci ejercicio, necesitará un martillo liviano o  "un martillo de goma.  1. Siéntese con el antebrazo laurel/derecho apoyado en dayna pritchett u otra superficie. Flexione el codo en un ángulo de 90 grados (ángulo recto). Coloque el antebrazo de modo que la sanchez quede hacia arriba en dirección al techo, con la mano descansando sobre el borde de la pritchett.  2. Sostenga un martillo en la mano izquierda/derecha.  ? Para que nanci ejercicio sea más fácil, sostenga el martillo cerca de la zuly del martillo.  ? Para que nanci ejercicio sea más difícil, sostenga el martillo cerca del extremo del martillo.  3. Sin  el codo, gire lentamente (rote) el antebrazo de modo que la sanchez quede hacia abajo en dirección al suelo (pronación).  4. Mantenga esta posición dennis ___________ segundos.  5. Vuelva lentamente a la posición inicial.  Repita __________ veces. Realice nanci ejercicio __________ veces al día.  Compresión del omóplato  1. Siéntese en dayna silla estable o póngase de pie y mantenga dayna buena postura. Si está sentado, no deje que la espalda toque el respaldo de la silla.  2. Los brazos deben estar a los costados del cuerpo, con los codos flexionados en un ángulo de 90 grados (ángulo recto). Coloque los antebrazos de modo que los pulgares queden hacia el techo (posición neutra).  3. Sin levantar los hombros, contraiga los omóplatos firmemente rigo si los uniera.  4. Mantenga esta posición dennis ___________ segundos.  5. Afloje y vuelva lentamente a la posición inicial.  Repita __________ veces. Realice nanci ejercicio __________ veces al día.  Esta información no tiene rigo fin reemplazar el consejo del médico. Asegúrese de hacerle al médico cualquier pregunta que tenga.  Document Released: 10/04/2007 Document Revised: 03/11/2020 Document Reviewed: 03/11/2020  Elsevier Patient Education © 2020 Elsevier Inc.    Plan de alimentación DASH  DASH Eating Plan  DASH es la sigla en inglés de \"Enfoques Alimentarios para Detener la Hipertensión\" (Dietary Approaches to Stop " "Hypertension). El plan de alimentación DASH ha demostrado bajar la presión arterial elevada (hipertensión). También puede reducir el riesgo de diabetes tipo 2, enfermedad cardíaca y accidente cerebrovascular. Lalitha plan también puede ayudar a adelgazar.  Consejos para seguir lalitha plan    Pautas generales  · Evite ingerir más de 2,300 mg (miligramos) de sal (sodio) por día. Si tiene hipertensión, es posible que necesite reducir la ingesta de sodio a 1,500 mg por día.  · Limite el consumo de alcohol a no más de 1 medida por día si es tremaine y no está embarazada, y 2 medidas por día si es hombre. Judi medida equivale a 12 oz (355 ml) de cerveza, 5 oz (148 ml) de vino o 1½ oz (44 ml) de bebidas alcohólicas de puma graduación.  · Trabaje con hull médico para mantener un peso saludable o perder peso. Pregúntele cuál es el peso recomendado para usted.  · Realice al menos 30 minutos de ejercicio que vivian que se acelere hull corazón (ejercicio aeróbico) la mayoría de los días de la semana. Estas actividades pueden incluir caminar, nadar o andar en bicicleta.  · Trabaje con hull médico o especialista en alimentación y nutrición (nutricionista) para ajustar hull plan alimentario a brendan necesidades calóricas personales.  Lectura de las etiquetas de los alimentos    · Verifique en las etiquetas de los alimentos, la cantidad de sodio por porción. Elija alimentos con menos del 5 por ciento del valor diario de sodio. Generalmente, los alimentos con menos de 300 mg de sodio por porción se encuadran dentro de lalitha plan alimentario.  · Para encontrar cereales integrales, busque la palabra \"integral\" rigo primera palabra en la lista de ingredientes.  De compras  · Compre productos en los que en hull etiqueta diga: “bajo contenido de sodio” o “sin agregado de sal”.  · Compre alimentos frescos. Evite los alimentos enlatados y comidas precocidas o congeladas.  Cocción  · Evite agregar sal cuando cocine. Use hierbas o aderezos sin sal, en lugar de sal " de pritchett o sal eddie. Consulte al médico o farmacéutico antes de usar sustitutos de la sal.  · No fría los alimentos. A la hora de cocinar los alimentos opte por hornearlos, hervirlos, grillarlos y asarlos a la forrest.  · Cocine con aceites cardiosaludables, rigo lipscomb, canola, soja o girasol.  Planificación de las comidas  · Consuma dayna dieta equilibrada, que incluya lo siguiente:  ? 5 o más porciones de frutas y verduras por día. Trate de que la mitad del plato de cada comida zonia frutas y verduras.  ? Hasta 6 u 8 porciones de cereales integrales por día.  ? Menos de 6 onzas de carne, aves o pescado magros por día. Dayna porción de 3 onzas de carne tiene sharla el mismo tamaño que un mahamed de cartas. Un huevo equivale a 1 onza.  ? Dos porciones de productos lácteos descremados por día.  ? Dayna porción de rosita secos, semillas o frijoles 5 veces por semana.  ? Grasas cardiosaludables. Las grasas saludables llamadas ácidos grasos omega-3 se encuentran en alimentos rigo semillas de yanelis y pescados de agua fría, rigo por ejemplo, alex, salmón y caballa.  · Limite la cantidad que ingiere de los siguientes alimentos:  ? Alimentos enlatados o envasados.  ? Alimentos con alto contenido de grasa trans, rigo alimentos fritos.  ? Alimentos con alto contenido de grasa saturada, rigo carne con grasa.  ? Dulces, postres, bebidas azucaradas y otros alimentos con azúcar agregada.  ? Productos lácteos enteros.  · No le agregue sal a los alimentos antes de probarlos.  · Trate de comer al menos 2 comidas vegetarianas por semana.  · Consuma más comida casera y menos de restaurante, de bufés y comida rápida.  · Cuando coma en un restaurante, pida que preparen hull comida con menos sal o, en lo posible, sin nada de sal.  ¿Qué alimentos se recomiendan?  Los alimentos enumerados a continuación no constituyen dayna lista completa. Hable con el nutricionista sobre las mejores opciones alimenticias para usted.  Cereales  Sanchez de salvado o  integral. Pasta de salvado o integral. Arroz integral. Jakob. Quinua. Pito burgol. Cereales integrales y con bajo contenido de sodio. Pan shana. Galletitas de agua con bajo contenido de grasa y sodio. Tortillas de harina integral.  Verduras  Verduras frescas o congeladas (crudas, al vapor, asadas o grilladas). Jugos de tomate y verduras con bajo contenido de sodio o reducidos en sodio. Salsa y pasta de tomate con bajo contenido de sodio o reducidas en sodio. Verduras enlatadas con bajo contenido de sodio o reducidas en sodio.  Frutas  Todas las frutas frescas, congeladas o disecadas. Frutas enlatadas en jugo natural (sin agregado de azúcar).  Carne y otros alimentos proteicos  Dariusz o pavo sin piel. Carne de dariusz o de pavo molida. Cerdo desgrasado. Pescado y mariscos. Claras de huevo. Porotos, guisantes o lentejas secos. Rosita secos, mantequilla de rosita secos y semillas sin sal. Frijoles enlatados sin sal. Vizcaino de carne vacuna magra, desgrasada. Embutidos magros, con bajo contenido de sodio.  Lácteos  Leche descremada (1 %) o descremada. Quesos sin grasa, con bajo contenido de grasa o descremados. Queso conner o ricota sin grasa, con bajo contenido de sodio. Yogur semidescremado o descremado. Queso con bajo contenido de grasa y sodio.  Grasas y aceites  Margarinas untables que no contengan grasas trans. Aceite vegetal. [place] y aderezos para ensaladas livianos o con bajo contenido de grasas (reducidos en sodio). Aceite de canola, cártamo, lipscomb, soja y girasol. [place].  Condimentos y otros alimentos  Hierbas. Especias. Mezclas de condimentos sin sal. Palomitas de maíz y pretzels sin sal. Dulces con bajo contenido de grasas.  ¿Qué alimentos no se recomiendan?  Los alimentos enumerados a continuación no constituyen dayna lista completa. Hable con el nutricionista sobre las mejores opciones alimenticias para usted.  Cereales  Productos de panificación hechos con grasa, rigo medialunas, magdalenas y algunos  panes. Comidas con arroz o pasta seca listas para usar.  Verduras  Verduras con crema o fritas. Verduras en salsa de queso. Verduras enlatadas regulares (que no zonia con bajo contenido de sodio o reducidas en sodio). Pasta y salsa de tomates enlatadas regulares (que no zonia con bajo contenido de sodio o reducidas en sodio). Jugos de tomate y verduras regulares (que no zonia con bajo contenido de sodio o reducidos en sodio). Pepinillos. [place].  Frutas  Fruta enlatada en almíbar liviano o espeso. Frutas cocidas en aceite. Frutas con salsa de crema o manteca.  Carne y otros alimentos proteicos  Vizcaino de carne con grasa. Costillas. Carne frita. [place]. Salchichas. Mortadela y otras felix procesadas. Rakesh. Panceta. Perros calientes (hotdogs). Salchicha de cerdo. Jesi secos y semillas con omari. Frijoles enlatados con agregado de omari. Pescado enlatado o ahumado. Huevos enteros o yemas. Dariusz o pavo con piel.  Lácteos  Leche entera o al 2 %, crema y mitad leche y mitad crema. Queso crema entero o con toda hull grasa. Yogur entero o endulzado. Quesos con toda hull grasa. Sustitutos de cremas no lácteas. Coberturas batidas. Quesos para untar y quesos procesados.  Grasas y aceites  [place]. Margarina en leonardo. [place] de cerdo. Materia grasa. Mantequilla clarificada. Grasa de panceta. Aceites tropicales rigo aceite de jeff, palmiste o sanchez.  Condimentos y otros alimentos  Palomitas de maíz y pretzels con sal. Sal de cebolla, sal de ajo, sal condimentada, sal de pritchett y sal eddie. Salsa [place]. Salsa tártara. Salsa barbacoa. Salsa teriyaki. Salsa de soja, incluso la que tiene contenido reducido de sodio. Salsa de carne. Salsas en jenn y envasadas. Salsa de pescado. Salsa de ostras. Salsa rosada. Rábano picante envasado. [place]. [place]aza. Saborizantes y tiernizantes para carne. Caldo en cubitos. Salsa picante y salsa tabasco. Escabeches envasados o ya preparados. Aderezos para tacos prefabricados o envasados.  Salsas. Aderezos comunes para ensalada.  Dónde encontrar más información:  · Instit[employer] Nacional del [place], los Pulmones y la [place] (National Heart, Lung, and Blood [place]): www.nhlbi.nih.gov  · Asociación [place] del [place] (American Heart Association): www.heart.org  Resumen  · El plan de alimentación DASH ha demostrado bajar la presión arterial elevada (hipertensión). También puede reducir el riesgo de diabetes tipo 2, enfermedad cardíaca y accidente cerebrovascular.  · Con el plan de alimentación DASH, deberá limitar el consumo de sal (sodio) a 2,300 mg por día. Si tiene hipertensión, es posible que necesite reducir la ingesta de sodio a 1,500 mg por día.  · Cuando siga el plan de alimentación DASH, trate de comer más frutas frescas y verduras, cereales integrales, felix magras, lácteos descremados y grasas cardiosaludables.  · Trabaje con hull médico o especialista en alimentación y nutrición (nutricionista) para ajustar hull plan alimentario a brendan necesidades calóricas personales.  Esta información no tiene rigo fin reemplazar el consejo del médico. Asegúrese de hacerle al médico cualquier pregunta que tenga.  Document Released: 12/06/2012 Document Revised: 04/09/2018 Document Reviewed: 04/09/2018  Elsevier Patient Education © 2020 [employer] Inc.    Hipertensión en los adultos  Hypertension, Adult  La presión arterial puma (hipertensión) se produce cuando la fuerza de la yolanda bombea a través de las arterias con mucha fuerza. Las arterias son los vasos sanguíneos que transportan la yolanda desde el corazón al ro del cuerpo. La hipertensión hace que el corazón vivian más esfuerzo para bombear yolanda y puede provocar que las arterias se estrechen o endurezcan. La hipertensión no tratada o no controlada puede causar infarto de miocardio, insuficiencia cardíaca, accidente cerebrovascular, enfermedad renal y otros problemas.  Judi lectura de la presión arterial consta de un número más alto sobre un número  más bajo. En condiciones ideales, la presión arterial debe estar por debajo de 120/80. El primer número (“superior”) es la presión sistólica. Es la medida de la presión de las arterias cuando el corazón late. El miquel número (“inferior”) es la presión diastólica. Es la medida de la presión en las arterias cuando el corazón se relaja.  ¿Cuáles son las causas?  Se desconoce la causa exacta de esta afección. Hay algunas afecciones que causan presión arterial puma o están relacionadas con mercedes.  ¿Qué incrementa el riesgo?  Algunos factores de riesgo de hipertensión están bajo hull control. Los siguientes factores pueden hacer que sea más propenso a desarrollar esta afección:  · Fumar.  · Tener diabetes mellitus tipo 2, colesterol alto, o ambos.  · No hacer la cantidad suficiente de actividad física o ejercicio.  · Tener sobrepeso.  · Consumir mucha grasa, azúcar, calorías o sal (sodio) en hull dieta.  · Beber alcohol en exceso.  Algunos factores de riesgo para la presión arterial puma pueden ser difíciles o imposibles de cambiar. Algunos de estos factores son los siguientes:  · Tener enfermedad renal crónica.  · Tener antecedentes familiares de presión arterial puma.  · Edad. Los riesgos aumentan con la edad.  · Rubin. El riesgo es mayor para las personas afroamericanas.  · Sexo. Antes de los 45 años, los hombres corren más riesgo que las mujeres. Después de los 65 años, las mujeres corren más riesgo que los hombres.  · Tener apnea obstructiva del sueño.  · Estrés.  ¿Cuáles son los signos o los síntomas?  Es posible que la presión arterial puma puede no cause síntomas. La presión arterial muy puma (crisis hipertensiva) puede provocar:  · Dolor de zuly.  · Ansiedad.  · Falta de aire.  · Hemorragia nasal.  · Náuseas y vómitos.  · Cambios en la visión.  · Dolor de pecho intenso.  · Convulsiones.  ¿Cómo se diagnostica?  Esta afección se diagnostica al medir hull presión arterial mientras se encuentra sentado, con el brazo  apoyado sobre dayna superficie plana, las piernas sin cruzar y los pies unique apoyados en el piso. El brazalete del tensiómetro debe colocarse directamente sobre la piel de la parte superior del brazo y al nivel de hull corazón. Debe medirla al menos dos veces en el mismo brazo. Determinadas condiciones pueden causar dayna diferencia de presión arterial entre el brazo laurel y el derecho.  Ciertos factores pueden provocar que las lecturas de la presión arterial zonia inferiores o superiores a lo normal por un período corto de tiempo:  · Si hull presión arterial es más puma cuando se encuentra en el consultorio del médico que cuando la mide en hull hogar, se denomina “hipertensión de bata mj”. La mayoría de las personas que tienen esta afección no deben ser medicadas.  · Si hull presión arterial es más puma en el hogar que cuando se encuentra en el consultorio del médico, se denomina “hipertensión enmascarada”. La mayoría de las personas que tienen esta afección deben ser medicadas para controlar la presión arterial.  Si tiene dayna lecturas de presión arterial puma dennis dayna visita o si tiene presión arterial normal con otros factores de riesgo, se le podrá pedir que vivian lo siguiente:  · Que regrese otro día para volver a controlar hull presión arterial nuevamente.  · Que se controle la presión arterial en hull casa dennis 1 semana o más.  Si se le diagnostica hipertensión, es posible que se le realicen otros análisis de yolanda o estudios de diagnóstico por imágenes para ayudar a hull médico a comprender hull riesgo general de tener otras afecciones.  ¿Cómo se trata?  Esta afección se trata haciendo cambios saludables en el estilo de veda, tales rigo ingerir alimentos saludables, realizar más ejercicio y reducir el consumo de alcohol. El médico puede recetarle medicamentos si los cambios en el estilo de veda no son suficientes para lograr controlar la presión arterial y si:  · Hull presión arterial sistólica está por encima de  130.  · Hull presión arterial diastólica está por encima de 80.  La presión arterial deseada puede variar en función de las enfermedades, la edad y otros factores personales.  Siga estas instrucciones en hull casa:  Comida y bebida    · Siga dayna dieta con alto contenido de fibras y potasio, y con bajo contenido de sodio, azúcar agregada y grasas. Un ejemplo de plan alimenticio es la dieta DASH (Dietary Approaches to Stop Hypertension, Métodos alimenticios para detener la hipertensión). Para alimentarse de esta manera:  ? Coma mucha fruta y verdura fresca. Trate de que la mitad del plato de cada comida sea de frutas y verduras.  ? Coma cereales integrales, rigo pasta integral, arroz integral o pan integral. Llene aproximadamente un cuarto del plato con cereales integrales.  ? Coma y mohan productos lácteos con bajo contenido de grasa, rigo leche descremada o yogur bajo en grasas.  ? Evite la ingesta de diehl de carne grasa, carne procesada o curada, y carne de ave con piel. Llene aproximadamente un cuarto del plato con proteínas magras, rigo pescado, triston sin piel, frijoles, huevos o tofu.  ? Evite ingerir alimentos prehechos y procesados. En general, estos tienen mayor cantidad de sodio, azúcar agregada y grasa.  · Reduzca hull ingesta diaria de sodio. La mayoría de las personas que tienen hipertensión deben comer menos de 1500 mg de sodio por día.  · No mohan alcohol si:  ? Hull médico le indica no hacerlo.  ? Está embarazada, puede estar embarazada o está tratando de quedar embarazada.  · Si hattie alcohol:  ? Limite la cantidad que hattie a lo siguiente:  § De 0 a 1 medida por día para las mujeres.  § De 0 a 2 medidas por día para los hombres.  ? Esté atento a la cantidad de alcohol que hay en las bebidas que erick. En los [place], dayna medida equivale a dayna botella de cerveza de 12 oz (355 ml), un vaso de vino de 5 oz (148 ml) o un vaso de dayna bebida alcohólica de puma graduación de 1½ oz (44 ml).  Estilo de  veda    · Trabaje con hull médico para mantener un peso saludable o perder peso. Pregúntele cuál es el peso recomendado para usted.  · Cinthia al menos 30 minutos de ejercicio la mayoría de los días de la semana. Estas actividades pueden incluir caminar, nadar o andar en bicicleta.  · Incluya ejercicios para fortalecer brendan músculos (ejercicios de resistencia), rigo [place] o levantamiento de pesas, rigo parte de hull rutina semanal de ejercicios. Intente realizar 30 minutos de nanci tipo de ejercicios al menos agusto días a la semana.  · No consuma ningún producto que contenga nicotina o tabaco, rigo cigarrillos, cigarrillos electrónicos y tabaco de mascar. Si necesita ayuda para dejar de fumar, consulte al médico.  · Contrólese la presión arterial en hull casa según las indicaciones del médico.  · Concurra a todas las visitas de seguimiento rigo se lo haya indicado el médico. [place] es importante.  Medicamentos  · [place] los medicamentos de venta abdiaziz y los recetados solamente rigo se lo haya indicado el médico. Siga cuidadosamente las indicaciones. Los medicamentos para la presión arterial deben tomarse según las indicaciones.  · No omita las dosis de medicamentos para la presión arterial. Si lo hace, estará en riesgo de tener problemas y puede hacer que los medicamentos zonia menos eficaces.  · Pregúntele a hull médico a qué efectos secundarios o reacciones a los medicamentos debe prestar atención.  Comuníquese con un médico si:  · Piensa que tiene dayna reacción a un medicamento que está tomando.  · Tiene kim de zuly frecuentes (recurrentes).  · Se siente mareado.  · Tiene hinchazón en los tobillos.  · Tiene problemas de visión.  Solicite ayuda inmediatamente si:  · Siente un dolor de zuly intenso o confusión.  · Siente debilidad inusual o adormecimiento.  · Siente que va a desmayarse.  · Siente un dolor intenso en el pecho o el abdomen.  · Vomita repetidas veces.  · Tiene dificultad para respirar.  Resumen  · La  hipertensión se produce cuando la yolanda bombea en las arterias con mucha fuerza. Si esta afección no se controla, podría correr riesgo de tener complicaciones graves.  · La presión arterial deseada puede variar en función de las enfermedades, la edad y otros factores personales. Para la mayoría de las personas, dayna presión arterial normal es wolfgang que 120/80.  · La hipertensión se trata con cambios en el estilo de veda, medicamentos o dayna combinación de ambos. Los cambios en el estilo de veda incluyen pérdida de peso, ingerir alimentos sanos, seguir dayna dieta baja en sodio, hacer más ejercicio y limitar el consumo de alcohol.  Esta información no tiene rigo fin reemplazar el consejo del médico. Asegúrese de hacerle al médico cualquier pregunta que tenga.  Document Released: 12/18/2006 Document Revised: 10/03/2019 Document Reviewed: 10/03/2019  Elsevier Patient Education © 2020 [employer]  Losartan tablets  ¿Qué es nanci medicamento?  El LOSARTÁN se utiliza para tratar la puma presión sanguínea y en ciertos pacientes reduce el riesgo de padecer derrame cerebral. Nanci medicamento también disminuye la evolución de la enfermedad renal en pacientes diabéticos.  Nanci medicamento puede ser utilizado para otros usos; si tiene alguna pregunta consulte con hull proveedor de atención médica o con hull farmacéutico.  MARCAS COMUNES: Cozaar  ¿Qué le krystal informar a mi profesional de la pal antes de danika nanci medicamento?  Necesita saber si usted presenta alguno de los siguientes problemas o situaciones:  · insuficiencia cardiaca  · enfermedad renal o hepática  · dayna reacción alérgica o inusual al losartán, a otros medicamentos, alimentos, colorantes o conservantes  · si está embarazada o buscando quedar embarazada  · si está amamantando a un bebé  ¿Cómo krystal utilizar nanci medicamento?  [place] nanci medicamento por vía oral con un vaso de agua. Siga las instrucciones de la etiqueta del medicamento. Nanci medicamento se puede danika  con o sin alimentos. [place] brendan dosis a intervalos regulares. No tome hull medicamento con dayna frecuencia mayor a la indicada.  Hable con hull pediatra para informarse acerca del uso de nanci medicamento en niños. Puede requerir atención especial.  Sobredosis: Póngase en contacto inmediatamente con un centro toxicológico o dayna salud de urgencia si usted dorothea que haya tomado demasiado medicamento.  ATENCIÓN: Nanci medicamento es solo para usted. No comparta nanci medicamento con nadie.  ¿Qué sucede si me olvido de dayna dosis?  Si olvida dayna dosis, tómela lo antes posible. Si es sharla la hora de la próxima dosis, tome sólo carlos dosis. No tome dosis adicionales o dobles.  ¿Qué puede interactuar con nanci medicamento?  · medicamentos para la presión sanguínea  · diuréticos, especialmente triamtereno, espironolactona o amilorida  · fluconazol  · los ARIADNE, medicamentos para el dolor o inflamación, rigo ibuprofeno o naproxeno  · sales o suplementos de potasio  · rifampicina  Puede ser que esta lista no menciona todas las posibles interacciones. Informe a hull profesional de la pal de todos los productos a base de hierbas, medicamentos de venta abdiaziz o suplementos nutritivos que esté tomando. Si usted fuma, consume bebidas alcohólicas o si utiliza drogas ilegales, indíqueselo también a hull profesional de la pal. Algunas sustancias pueden interactuar con hull medicamento.  ¿A qué krystal estar atento al usar nanci medicamento?  Visite a hull médico o a hull profesional de la pal para chequear hull evolución periódicamente. Controle hull presión sanguínea rigo le haya indicado. Pregunte a hull médico o a hull profesional de la pal cuál debe ser hull presión sanguínea y cuándo deberá comunicarse con él o mercedes. Comuníquese con hull médico o con hull profesional de la pal si observa que hull pulso cardiaco es rápido o irregular.  Las mujeres deben informar a hull médico si están buscando quedar embarazadas o si creen que están embarazadas. Existe la posibilidad  de efectos secundarios graves a un bebé sin nacer, particularmente dennis el miquel o tercer trimestre. Para más información hable con hull profesional de la pal o hull farmacéutico.  Puede experimentar somnolencia o mareos. No conduzca ni utilice maquinaria, ni vivian nada que le exija permanecer en estado de alerta hasta que sepa cómo le afecta nanci medicamento. No se siente ni se ponga de pie con rapidez, especialmente si es un paciente de edad avanzada. [place] reduce el riesgo de mareos o desmayos. El alcohol puede aumentar los mareos y la somnolencia. Evite consumir bebidas alcohólicas.  Evite los sustitutos de la sal a menos que hull médico o hull profesional de la pal indique lo contrario.  No se trate usted mismo para tos, resfríos o kim mientras está tomando nanci medicamento sin consultar a hull médico o a hull profesional de la pal. Algunos ingredientes pueden aumentar hull presión sanguínea.  ¿Qué efectos secundarios puedo tener al utilizar nanci medicamento?  Efectos secundarios que debe informar a hull médico o a hull profesional de la pal tan pronto rigo sea posible:  · confusión, mareos, aturdimiento o desmayos  · reducción en el volumen de orina  · dificultad al respirar o tragar, ronquera o estrechamiento de la garganta  · pulso cardiaco rápido o irregular, palpitaciones o dolor en el pecho  · erupción cutánea, picazón  · hinchazón del winnie, labios, lengua, maira o pies  Efectos secundarios que, por lo general, no requieren atención médica (debe informarlos a hull médico o a hull profesional de la pal si persisten o si son molestos):  · tos  · disminución de la función sexual o del deseo  · dolor de zuly  · congestión nasal o nariz tapada  · náuseas o dolor de estómago  · kim o calambres musculares  Puede ser que esta lista no menciona todos los posibles efectos secundarios. Comuníquese a hull médico por asesoramiento médico sobre los efectos secundarios. Usted puede informar los efectos secundarios a la  FDA por teléfono al [phone].  ¿Dónde krystal guardar mi medicina?  [place] fuera del alcance de los niños.  [place] a temperatura ambiente, entre 15 y 30 grados C (59 y 86 grados F). Protéjala chantel. Mantenga el envase unique cerrado. Deseche todo el medicamento que no haya utilizado, después de la fecha de vencimiento.  ATENCIÓN: Lalitha folleto es un resumen. Puede ser que no cubra toda la posible información. Si usted tiene preguntas acerca de esta medicina, consulte con hull médico, hull farmacéutico o hull profesional de la pal.  © 2020 [employer] (2016-02-09 00:00:00)

## 2020-12-02 ENCOUNTER — LAB (OUTPATIENT)
Dept: LAB | Facility: HOSPITAL | Age: 60
End: 2020-12-02

## 2020-12-02 DIAGNOSIS — Z11.59 NEED FOR HEPATITIS C SCREENING TEST: ICD-10-CM

## 2020-12-02 DIAGNOSIS — E55.9 VITAMIN D DEFICIENCY: ICD-10-CM

## 2020-12-02 DIAGNOSIS — I10 ESSENTIAL HYPERTENSION: ICD-10-CM

## 2020-12-02 DIAGNOSIS — Z00.00 WELLNESS EXAMINATION: ICD-10-CM

## 2020-12-02 DIAGNOSIS — E78.5 DYSLIPIDEMIA: ICD-10-CM

## 2020-12-02 LAB
25(OH)D3 SERPL-MCNC: 53.3 NG/ML (ref 30–100)
ALBUMIN SERPL-MCNC: 4.3 G/DL (ref 3.5–5.2)
ALBUMIN/GLOB SERPL: 1.4 G/DL
ALP SERPL-CCNC: 93 U/L (ref 39–117)
ALT SERPL W P-5'-P-CCNC: 24 U/L (ref 1–33)
ANION GAP SERPL CALCULATED.3IONS-SCNC: 7.9 MMOL/L (ref 5–15)
AST SERPL-CCNC: 29 U/L (ref 1–32)
BILIRUB SERPL-MCNC: 0.4 MG/DL (ref 0–1.2)
BUN SERPL-MCNC: 12 MG/DL (ref 6–20)
BUN/CREAT SERPL: 19.7 (ref 7–25)
CALCIUM SPEC-SCNC: 9.5 MG/DL (ref 8.6–10.5)
CHLORIDE SERPL-SCNC: 104 MMOL/L (ref 98–107)
CHOLEST SERPL-MCNC: 188 MG/DL (ref 0–200)
CO2 SERPL-SCNC: 29.1 MMOL/L (ref 22–29)
CREAT SERPL-MCNC: 0.61 MG/DL (ref 0.57–1)
GFR SERPL CREATININE-BSD FRML MDRD: 101 ML/MIN/1.73
GLOBULIN UR ELPH-MCNC: 3 GM/DL
GLUCOSE SERPL-MCNC: 87 MG/DL (ref 65–99)
HCV AB SER DONR QL: NORMAL
HDLC SERPL-MCNC: 68 MG/DL (ref 40–60)
LDLC SERPL CALC-MCNC: 102 MG/DL (ref 0–100)
LDLC/HDLC SERPL: 1.47 {RATIO}
POTASSIUM SERPL-SCNC: 4.5 MMOL/L (ref 3.5–5.2)
PROT SERPL-MCNC: 7.3 G/DL (ref 6–8.5)
SODIUM SERPL-SCNC: 141 MMOL/L (ref 136–145)
TRIGL SERPL-MCNC: 101 MG/DL (ref 0–150)
VLDLC SERPL-MCNC: 18 MG/DL (ref 5–40)

## 2020-12-02 PROCEDURE — 80053 COMPREHEN METABOLIC PANEL: CPT

## 2020-12-02 PROCEDURE — 80061 LIPID PANEL: CPT

## 2020-12-02 PROCEDURE — 86803 HEPATITIS C AB TEST: CPT

## 2020-12-02 PROCEDURE — 82306 VITAMIN D 25 HYDROXY: CPT

## 2020-12-09 ENCOUNTER — OFFICE VISIT (OUTPATIENT)
Dept: FAMILY MEDICINE CLINIC | Facility: CLINIC | Age: 60
End: 2020-12-09

## 2020-12-09 VITALS
HEIGHT: 63 IN | DIASTOLIC BLOOD PRESSURE: 82 MMHG | BODY MASS INDEX: 29.95 KG/M2 | SYSTOLIC BLOOD PRESSURE: 122 MMHG | RESPIRATION RATE: 15 BRPM | OXYGEN SATURATION: 99 % | WEIGHT: 169 LBS | HEART RATE: 81 BPM

## 2020-12-09 DIAGNOSIS — I10 ESSENTIAL HYPERTENSION: Primary | ICD-10-CM

## 2020-12-09 DIAGNOSIS — Z12.11 SCREEN FOR COLON CANCER: ICD-10-CM

## 2020-12-09 PROCEDURE — 99213 OFFICE O/P EST LOW 20 MIN: CPT | Performed by: NURSE PRACTITIONER

## 2020-12-09 NOTE — PATIENT INSTRUCTIONS
"Managing Your Hypertension  Hypertension is commonly called high blood pressure. This is when the force of your blood pressing against the walls of your arteries is too strong. Arteries are blood vessels that carry blood from your heart throughout your body. Hypertension forces the heart to work harder to pump blood, and may cause the arteries to become narrow or stiff. Having untreated or uncontrolled hypertension can cause heart attack, stroke, kidney disease, and other problems.  What are blood pressure readings?  A blood pressure reading consists of a higher number over a lower number. Ideally, your blood pressure should be below 120/80. The first (\"top\") number is called the systolic pressure. It is a measure of the pressure in your arteries as your heart beats. The second (\"bottom\") number is called the diastolic pressure. It is a measure of the pressure in your arteries as the heart relaxes.  What does my blood pressure reading mean?  Blood pressure is classified into four stages. Based on your blood pressure reading, your health care provider may use the following stages to determine what type of treatment you need, if any. Systolic pressure and diastolic pressure are measured in a unit called mm Hg.  Normal  · Systolic pressure: below 120.  · Diastolic pressure: below 80.  Elevated  · Systolic pressure: 120-129.  · Diastolic pressure: below 80.  Hypertension stage 1  · Systolic pressure: 130-139.  · Diastolic pressure: 80-89.  Hypertension stage 2  · Systolic pressure: 140 or above.  · Diastolic pressure: 90 or above.  What health risks are associated with hypertension?  Managing your hypertension is an important responsibility. Uncontrolled hypertension can lead to:  · A heart attack.  · A stroke.  · A weakened blood vessel (aneurysm).  · Heart failure.  · Kidney damage.  · Eye damage.  · Metabolic syndrome.  · Memory and concentration problems.  What changes can I make to manage my " hypertension?  Hypertension can be managed by making lifestyle changes and possibly by taking medicines. Your health care provider will help you make a plan to bring your blood pressure within a normal range.  Eating and drinking    · Eat a diet that is high in fiber and potassium, and low in salt (sodium), added sugar, and fat. An example eating plan is called the DASH (Dietary Approaches to Stop Hypertension) diet. To eat this way:  ? Eat plenty of fresh fruits and vegetables. Try to fill half of your plate at each meal with fruits and vegetables.  ? Eat whole grains, such as whole wheat pasta, brown rice, or whole grain bread. Fill about one quarter of your plate with whole grains.  ? Eat low-fat diary products.  ? Avoid fatty cuts of meat, processed or cured meats, and poultry with skin. Fill about one quarter of your plate with lean proteins such as fish, chicken without skin, beans, eggs, and tofu.  ? Avoid premade and processed foods. These tend to be higher in sodium, added sugar, and fat.  · Reduce your daily sodium intake. Most people with hypertension should eat less than 1,500 mg of sodium a day.  · Limit alcohol intake to no more than 1 drink a day for nonpregnant women and 2 drinks a day for men. One drink equals 12 oz of beer, 5 oz of wine, or 1½ oz of hard liquor.  Lifestyle  · Work with your health care provider to maintain a healthy body weight, or to lose weight. Ask what an ideal weight is for you.  · Get at least 30 minutes of exercise that causes your heart to beat faster (aerobic exercise) most days of the week. Activities may include walking, swimming, or biking.  · Include exercise to strengthen your muscles (resistance exercise), such as weight lifting, as part of your weekly exercise routine. Try to do these types of exercises for 30 minutes at least 3 days a week.  · Do not use any products that contain nicotine or tobacco, such as cigarettes and e-cigarettes. If you need help quitting,  ask your health care provider.  · Control any long-term (chronic) conditions you have, such as high cholesterol or diabetes.  Monitoring  · Monitor your blood pressure at home as told by your health care provider. Your personal target blood pressure may vary depending on your medical conditions, your age, and other factors.  · Have your blood pressure checked regularly, as often as told by your health care provider.  Working with your health care provider  · Review all the medicines you take with your health care provider because there may be side effects or interactions.  · Talk with your health care provider about your diet, exercise habits, and other lifestyle factors that may be contributing to hypertension.  · Visit your health care provider regularly. Your health care provider can help you create and adjust your plan for managing hypertension.  Will I need medicine to control my blood pressure?  Your health care provider may prescribe medicine if lifestyle changes are not enough to get your blood pressure under control, and if:  · Your systolic blood pressure is 130 or higher.  · Your diastolic blood pressure is 80 or higher.  Take medicines only as told by your health care provider. Follow the directions carefully. Blood pressure medicines must be taken as prescribed. The medicine does not work as well when you skip doses. Skipping doses also puts you at risk for problems.  Contact a health care provider if:  · You think you are having a reaction to medicines you have taken.  · You have repeated (recurrent) headaches.  · You feel dizzy.  · You have swelling in your ankles.  · You have trouble with your vision.  Get help right away if:  · You develop a severe headache or confusion.  · You have unusual weakness or numbness, or you feel faint.  · You have severe pain in your chest or abdomen.  · You vomit repeatedly.  · You have trouble breathing.  Summary  · Hypertension is when the force of blood pumping  "through your arteries is too strong. If this condition is not controlled, it may put you at risk for serious complications.  · Your personal target blood pressure may vary depending on your medical conditions, your age, and other factors. For most people, a normal blood pressure is less than 120/80.  · Hypertension is managed by lifestyle changes, medicines, or both. Lifestyle changes include weight loss, eating a healthy, low-sodium diet, exercising more, and limiting alcohol.  This information is not intended to replace advice given to you by your health care provider. Make sure you discuss any questions you have with your health care provider.  Document Revised: 04/10/2020 Document Reviewed: 11/15/2017  [employer] Patient Education © 2020 [employer]      DASH Eating Plan  DASH stands for \"Dietary Approaches to Stop Hypertension.\" The DASH eating plan is a healthy eating plan that has been shown to reduce high blood pressure (hypertension). It may also reduce your risk for type 2 diabetes, heart disease, and stroke. The DASH eating plan may also help with weight loss.  What are tips for following this plan?    General guidelines  · Avoid eating more than 2,300 mg (milligrams) of salt (sodium) a day. If you have hypertension, you may need to reduce your sodium intake to 1,500 mg a day.  · Limit alcohol intake to no more than 1 drink a day for nonpregnant women and 2 drinks a day for men. One drink equals 12 oz of beer, 5 oz of wine, or 1½ oz of hard liquor.  · Work with your health care provider to maintain a healthy body weight or to lose weight. Ask what an ideal weight is for you.  · Get at least 30 minutes of exercise that causes your heart to beat faster (aerobic exercise) most days of the week. Activities may include walking, swimming, or biking.  · Work with your health care provider or diet and nutrition specialist (dietitian) to adjust your eating plan to your individual calorie needs.  Reading food " "labels    · Check food labels for the amount of sodium per serving. Choose foods with less than 5 percent of the Daily Value of sodium. Generally, foods with less than 300 mg of sodium per serving fit into this eating plan.  · To find whole grains, look for the word \"whole\" as the first word in the ingredient list.  Shopping  · Buy products labeled as \"low-sodium\" or \"no salt added.\"  · Buy fresh foods. Avoid canned foods and premade or frozen meals.  Cooking  · Avoid adding salt when cooking. Use salt-free seasonings or herbs instead of table salt or sea salt. Check with your health care provider or pharmacist before using salt substitutes.  · Do not coley foods. Cook foods using healthy methods such as baking, boiling, grilling, and broiling instead.  · Cook with heart-healthy oils, such as olive, canola, soybean, or sunflower oil.  Meal planning  · Eat a balanced diet that includes:  ? 5 or more servings of fruits and vegetables each day. At each meal, try to fill half of your plate with fruits and vegetables.  ? Up to 6-8 servings of whole grains each day.  ? Less than 6 oz of lean meat, poultry, or fish each day. A 3-oz serving of meat is about the same size as a deck of cards. One egg equals 1 oz.  ? 2 servings of low-fat dairy each day.  ? A serving of nuts, seeds, or beans 5 times each week.  ? Heart-healthy fats. Healthy fats called Omega-3 fatty acids are found in foods such as flaxseeds and coldwater fish, like sardines, salmon, and mackerel.  · Limit how much you eat of the following:  ? Canned or prepackaged foods.  ? Food that is high in trans fat, such as fried foods.  ? Food that is high in saturated fat, such as fatty meat.  ? Sweets, desserts, sugary drinks, and other foods with added sugar.  ? Full-fat dairy products.  · Do not salt foods before eating.  · Try to eat at least 2 vegetarian meals each week.  · Eat more home-cooked food and less restaurant, buffet, and fast food.  · When eating at a " restaurant, ask that your food be prepared with less salt or no salt, if possible.  What foods are recommended?  The items listed may not be a complete list. Talk with your dietitian about what dietary choices are best for you.  Grains  Whole-grain or whole-wheat bread. Whole-grain or whole-wheat pasta. Brown rice. Oatmeal. Quinoa. Bulgur. Whole-grain and low-sodium cereals. Aide bread. Low-fat, low-sodium crackers. Whole-wheat flour tortillas.  Vegetables  Fresh or frozen vegetables (raw, steamed, roasted, or grilled). Low-sodium or reduced-sodium tomato and vegetable juice. Low-sodium or reduced-sodium tomato sauce and tomato paste. Low-sodium or reduced-sodium canned vegetables.  Fruits  All fresh, dried, or frozen fruit. Canned fruit in natural juice (without added sugar).  Meat and other protein foods  Skinless chicken or turkey. Ground chicken or turkey. Pork with fat trimmed off. Fish and seafood. Egg whites. Dried beans, peas, or lentils. Unsalted nuts, nut butters, and seeds. Unsalted canned beans. Lean cuts of beef with fat trimmed off. Low-sodium, lean deli meat.  Dairy  Low-fat (1%) or fat-free (skim) milk. Fat-free, low-fat, or reduced-fat cheeses. Nonfat, low-sodium ricotta or cottage cheese. Low-fat or nonfat yogurt. Low-fat, low-sodium cheese.  Fats and oils  Soft margarine without trans fats. Vegetable oil. Low-fat, reduced-fat, or light mayonnaise and salad dressings (reduced-sodium). Canola, safflower, olive, soybean, and sunflower oils. Avocado.  Seasoning and other foods  Herbs. Spices. Seasoning mixes without salt. Unsalted popcorn and pretzels. Fat-free sweets.  What foods are not recommended?  The items listed may not be a complete list. Talk with your dietitian about what dietary choices are best for you.  Grains  Baked goods made with fat, such as croissants, muffins, or some breads. Dry pasta or rice meal packs.  Vegetables  Creamed or fried vegetables. Vegetables in a cheese sauce.  Regular canned vegetables (not low-sodium or reduced-sodium). Regular canned tomato sauce and paste (not low-sodium or reduced-sodium). Regular tomato and vegetable juice (not low-sodium or reduced-sodium). Pickles. Olives.  Fruits  Canned fruit in a light or heavy syrup. Fried fruit. Fruit in cream or butter sauce.  Meat and other protein foods  Fatty cuts of meat. Ribs. Fried meat. Guo. Sausage. Bologna and other processed lunch meats. Salami. Fatback. Hotdogs. Bratwurst. Salted nuts and seeds. Canned beans with added salt. Canned or smoked fish. Whole eggs or egg yolks. Chicken or turkey with skin.  Dairy  Whole or 2% milk, cream, and half-and-half. Whole or full-fat cream cheese. Whole-fat or sweetened yogurt. Full-fat cheese. Nondairy creamers. Whipped toppings. Processed cheese and cheese spreads.  Fats and oils  Butter. Stick margarine. Lard. Shortening. Ghee. Guo fat. Tropical oils, such as coconut, palm kernel, or palm oil.  Seasoning and other foods  Salted popcorn and pretzels. Onion salt, garlic salt, seasoned salt, table salt, and sea salt. Worcestershire sauce. Tartar sauce. Barbecue sauce. Teriyaki sauce. Soy sauce, including reduced-sodium. Steak sauce. Canned and packaged gravies. Fish sauce. Oyster sauce. Cocktail sauce. Horseradish that you find on the shelf. Ketchup. Mustard. Meat flavorings and tenderizers. Bouillon cubes. Hot sauce and Tabasco sauce. Premade or packaged marinades. Premade or packaged taco seasonings. Relishes. Regular salad dressings.  Where to find more information:  · National Heart, Lung, and Blood [place]: www.nhlbi.nih.gov  · American Heart Association: www.heart.org  Summary  · The DASH eating plan is a healthy eating plan that has been shown to reduce high blood pressure (hypertension). It may also reduce your risk for type 2 diabetes, heart disease, and stroke.  · With the DASH eating plan, you should limit salt (sodium) intake to 2,300 mg a day. If you have  hypertension, you may need to reduce your sodium intake to 1,500 mg a day.  · When on the DASH eating plan, aim to eat more fresh fruits and vegetables, whole grains, lean proteins, low-fat dairy, and heart-healthy fats.  · Work with your health care provider or diet and nutrition specialist (dietitian) to adjust your eating plan to your individual calorie needs.  This information is not intended to replace advice given to you by your health care provider. Make sure you discuss any questions you have with your health care provider.  Document Revised: 11/30/2018 Document Reviewed: 12/11/2017  [employer] Patient Education © 2020 [employer]      Colorectal Cancer Screening    Colorectal cancer screening is a group of tests that are used to check for colorectal cancer before symptoms develop. Colorectal refers to the colon and rectum. The colon and rectum are located at the end of the digestive tract and carry bowel movements out of the body.  Who should have screening?  All adults starting at age 50 until age 75 should have screening. Your health care provider may recommend screening at age 45. You will have tests every 1-10 years, depending on your results and the type of screening test.  You may have screening tests starting at an earlier age, or more frequently than other people, if you have any of the following risk factors:  · A personal or family history of colorectal cancer or abnormal growths (polyps).  · Inflammatory bowel disease, such as ulcerative colitis or Crohn's disease.  · A history of having radiation treatment to the abdomen or pelvic area for cancer.  · Colorectal cancer symptoms, such as changes in bowel habits or blood in your stool.  · A type of colon cancer syndrome that is passed from parent to child (hereditary), such as:  ? Renteria syndrome.  ? Familial adenomatous polyposis.  ? Turcot syndrome.  ? Peutz-Jeghers syndrome.  Screening recommendations for adults who are 75-85 years old vary  depending on health.  How is screening done?  There are several types of colorectal screening tests. You may have one or more of the following:  · Guaiac-based fecal occult blood testing. For this test, a stool (feces) sample is checked for hidden (occult) blood, which could be a sign of colorectal cancer.  · Fecal immunochemical test (FIT). For this test, a stool sample is checked for blood, which could be a sign of colorectal cancer.  · Stool DNA test. For this test, a stool sample is checked for blood and changes in DNA that could lead to colorectal cancer.  · Sigmoidoscopy. During this test, a thin, flexible tube with a camera on the end (sigmoidoscope) is used to examine the rectum and the lower colon.  · Colonoscopy. During this test, a long, flexible tube with a camera on the end (colonoscope) is used to examine the entire colon and rectum. With a colonoscopy, it is possible to take a sample of tissue (biopsy) and remove small polyps during the test.  · Virtual colonoscopy. Instead of a colonoscope, this type of colonoscopy uses X-rays (CT scan) and computers to produce images of the colon and rectum.  What are the benefits of screening?  Screening reduces your risk for colorectal cancer and can help identify cancer at an early stage, when the cancer can be removed or treated more easily. It is common for polyps to form in the lining of the colon, especially as you age. These polyps may be cancerous or become cancerous over time. Screening can identify these polyps.  What are the risks of screening?  Each screening test may have different risks.  · Stool sample tests have fewer risks than other types of screening tests. However, you may need more tests to confirm results from a stool sample test.  · Screening tests that involve X-rays expose you to low levels of radiation, which may slightly increase your cancer risk. The benefit of detecting cancer outweighs the slight increase in risk.  · Screening tests  such as sigmoidoscopy and colonoscopy may place you at risk for bleeding, intestinal damage, infection, or a reaction to medicines given during the exam.  Talk with your health care provider to understand your risk for colorectal cancer and to make a screening plan that is right for you.  Questions to ask your health care provider  · When should I start colorectal cancer screening?  · What is my risk for colorectal cancer?  · How often do I need screening?  · Which screening tests do I need?  · How do I get my test results?  · What do my results mean?  Where to find more information  Learn more about colorectal cancer screening from:  · The American Cancer Society: www.cancer.org  · The National Cancer [place]: www.cancer.gov  Summary  · Colorectal cancer screening is a group of tests used to check for colorectal cancer before symptoms develop.  · Screening reduces your risk for colorectal cancer and can help identify cancer at an early stage, when the cancer can be removed or treated more easily.  · All adults starting at age 50 until age 75 should have screening. Your health care provider may recommend screening at age 45.  · You may have screening tests starting at an earlier age, or more frequently than other people, if you have certain risk factors.  · Talk with your health care provider to understand your risk for colorectal cancer and to make a screening plan that is right for you.  This information is not intended to replace advice given to you by your health care provider. Make sure you discuss any questions you have with your health care provider.  Document Revised: 04/08/2020 Document Reviewed: 09/19/2018  [employer] Patient Education © 2020 [employer]

## 2020-12-09 NOTE — PROGRESS NOTES
Chief Complaint   Patient presents with   • Hypertension     Pt here for 2 wk f/u, has been keeping a BP log and its been very good, usually around 118/80       Subjective   Carlota Santana is a 59 y.o. female    History of Present Illness  Patient today to follow-up on high blood pressure.  Her blood pressures at home have been averaging around 1 teens to 130s over 60s predominantly.  Her weight is down 4 pounds from last visit.  She does take losartan 100 mg daily and denies any side effects at this time.    No Known Allergies  Past Medical History:   Diagnosis Date   • Hypertension       Past Surgical History:   Procedure Laterality Date   • GASTRIC BYPASS       Social History     Socioeconomic History   • Marital status:      Spouse name: Not on file   • Number of children: Not on file   • Years of education: Not on file   • Highest education level: Not on file   Tobacco Use   • Smoking status: Never Smoker   • Smokeless tobacco: Never Used   Substance and Sexual Activity   • Alcohol use: Yes   • Drug use: Not Currently        Current Outpatient Medications:   •  Ascorbic Acid Buffered (BUFFERED VITAMIN C) 1000 MG capsule, Take  by mouth., Disp: , Rfl:   •  Calcium 600-200 MG-UNIT per tablet, Take 1 tablet by mouth Daily., Disp: , Rfl:   •  Cholecalciferol (VITAMIN D3) 50 MCG (2000 UT) capsule, Take 2,000 Units by mouth Daily., Disp: , Rfl:   •  diphenhydrAMINE (Benadryl Allergy) 25 MG tablet, Take 25 mg by mouth Every 6 (Six) Hours As Needed for Itching., Disp: , Rfl:   •  losartan (COZAAR) 100 MG tablet, Take 1 tablet by mouth Daily., Disp: 30 tablet, Rfl: 2  •  Magnesium 250 MG tablet, Take  by mouth., Disp: , Rfl:   •  Multiple Vitamins-Minerals (MULTIVITAMIN ADULT EXTRA C PO), Take  by mouth., Disp: , Rfl:   •  vitamin E 400 UNIT capsule, Take 400 Units by mouth Daily., Disp: , Rfl:      Review of Systems   Constitutional: Negative for chills, fatigue, fever and unexpected weight change.    Respiratory: Negative for cough, chest tightness, shortness of breath and wheezing.    Cardiovascular: Negative for chest pain, palpitations and leg swelling.   Gastrointestinal: Negative for constipation, diarrhea, nausea and vomiting.   Genitourinary: Negative for difficulty urinating and dysuria.   Skin: Negative for color change and rash.   Neurological: Negative for dizziness, syncope, weakness and headaches.   Psychiatric/Behavioral: Negative for sleep disturbance.       Objective     Vitals:    12/09/20 1552   BP: 122/82   Pulse: 81   Resp: 15   SpO2: 99%         12/09/20  1552   Weight: 76.7 kg (169 lb)     Body mass index is 29.68 kg/m².  Results for orders placed or performed in visit on 12/02/20   Comprehensive Metabolic Panel    Specimen: Blood   Result Value Ref Range    Glucose 87 65 - 99 mg/dL    BUN 12 6 - 20 mg/dL    Creatinine 0.61 0.57 - 1.00 mg/dL    Sodium 141 136 - 145 mmol/L    Potassium 4.5 3.5 - 5.2 mmol/L    Chloride 104 98 - 107 mmol/L    CO2 29.1 (H) 22.0 - 29.0 mmol/L    Calcium 9.5 8.6 - 10.5 mg/dL    Total Protein 7.3 6.0 - 8.5 g/dL    Albumin 4.30 3.50 - 5.20 g/dL    ALT (SGPT) 24 1 - 33 U/L    AST (SGOT) 29 1 - 32 U/L    Alkaline Phosphatase 93 39 - 117 U/L    Total Bilirubin 0.4 0.0 - 1.2 mg/dL    eGFR Non African Amer 101 >60 mL/min/1.73    Globulin 3.0 gm/dL    A/G Ratio 1.4 g/dL    BUN/Creatinine Ratio 19.7 7.0 - 25.0    Anion Gap 7.9 5.0 - 15.0 mmol/L   Lipid Panel    Specimen: Blood   Result Value Ref Range    Total Cholesterol 188 0 - 200 mg/dL    Triglycerides 101 0 - 150 mg/dL    HDL Cholesterol 68 (H) 40 - 60 mg/dL    LDL Cholesterol  102 (H) 0 - 100 mg/dL    VLDL Cholesterol 18 5 - 40 mg/dL    LDL/HDL Ratio 1.47    Vitamin D 25 Hydroxy    Specimen: Blood   Result Value Ref Range    25 Hydroxy, Vitamin D 53.3 30.0 - 100.0 ng/ml   Hepatitis C Antibody    Specimen: Blood   Result Value Ref Range    Hepatitis C Ab Non-Reactive Non-Reactive       Physical Exam  Vitals signs  reviewed.   Constitutional:       Appearance: She is well-developed.   HENT:      Head: Normocephalic and atraumatic.   Cardiovascular:      Rate and Rhythm: Normal rate and regular rhythm.      Heart sounds: Normal heart sounds.   Pulmonary:      Effort: Pulmonary effort is normal.      Breath sounds: Normal breath sounds.   Genitourinary:     Comments: deferred  Skin:     General: Skin is warm and dry.   Neurological:      Mental Status: She is alert and oriented to person, place, and time.   Psychiatric:         Behavior: Behavior normal.         Assessment/Plan   Problems Addressed this Visit        Cardiovascular and Mediastinum    Essential hypertension - Primary      Other Visit Diagnoses     Screen for colon cancer        Relevant Orders    Ambulatory Referral For Screening Colonoscopy      Diagnoses       Codes Comments    Essential hypertension    -  Primary ICD-10-CM: I10  ICD-9-CM: 401.9     Screen for colon cancer     ICD-10-CM: Z12.11  ICD-9-CM: V76.51       For blood pressure we will continue our current medications at this time. Patient instructed to check blood pressure daily, record, and bring to next visit. If the readings run 140/90 or greater, the patient is to call my office or return sooner for evaluation. Pt advised to eat a heart healthy diet and get regular aerobic exercise.    She does need a colonoscopy we will set her up for this after January 15.    RV-November to December for physical    Counseling provided on hypertension.    Rehan Ramirez, WALDEMAR   12/9/2020   16:11 EST

## 2020-12-30 ENCOUNTER — TELEPHONE (OUTPATIENT)
Dept: FAMILY MEDICINE CLINIC | Facility: CLINIC | Age: 60
End: 2020-12-30

## 2020-12-30 RX ORDER — PREDNISONE 20 MG/1
40 TABLET ORAL DAILY
Qty: 14 TABLET | Refills: 0 | Status: SHIPPED | OUTPATIENT
Start: 2020-12-30 | End: 2022-04-15

## 2020-12-30 NOTE — TELEPHONE ENCOUNTER
Patient's  called again for his wife she is covid positive and has symptoms such as cough, chest and nose congestion, he is asking if she can get medication sent in for her? States he was tested positive as well and his PCP sent in medication for him to take and he feels better. Please advise   Would like a call back today.

## 2020-12-30 NOTE — TELEPHONE ENCOUNTER
PATIENTS  NIMESH CALLED REQUESTING ADVICE DUE TO POSITIVE COVID RESULT.    CALL BACK:  [phone].

## 2020-12-30 NOTE — TELEPHONE ENCOUNTER
NIMESH CALLED TO STATE HE IS COVID POSITIVE AND NOW THE PATIENT IS EXPERIENCING SHORTNESS OF BREATH.  HE WANTS TO KNOW WHAT HE SHOULD DO.    PLEASE CONTACT NIMESH TO ADVISE.    CALLBACK:  [phone]

## 2021-01-30 DIAGNOSIS — I10 ESSENTIAL HYPERTENSION: ICD-10-CM

## 2021-02-01 RX ORDER — LOSARTAN POTASSIUM 100 MG/1
TABLET ORAL
Qty: 30 TABLET | Refills: 10 | Status: SHIPPED | OUTPATIENT
Start: 2021-02-01 | End: 2022-04-15 | Stop reason: SDUPTHER

## 2022-04-12 NOTE — TELEPHONE ENCOUNTER
PER NIMESH CALLED, STATED HE RECEIVED A CALL FROM OUR OFFICE. I DON'T SEE ANY NOTES IN CHART.   [employer]

## 2022-04-15 ENCOUNTER — OFFICE VISIT (OUTPATIENT)
Dept: FAMILY MEDICINE CLINIC | Facility: CLINIC | Age: 62
End: 2022-04-15

## 2022-04-15 VITALS
SYSTOLIC BLOOD PRESSURE: 118 MMHG | DIASTOLIC BLOOD PRESSURE: 65 MMHG | OXYGEN SATURATION: 99 % | BODY MASS INDEX: 31.36 KG/M2 | WEIGHT: 177 LBS | HEIGHT: 63 IN | TEMPERATURE: 97.5 F | HEART RATE: 62 BPM

## 2022-04-15 DIAGNOSIS — Z00.00 WELLNESS EXAMINATION: Primary | ICD-10-CM

## 2022-04-15 DIAGNOSIS — S69.92XA JAMMED FINGER (INTERPHALANGEAL JOINT), LEFT, INITIAL ENCOUNTER: ICD-10-CM

## 2022-04-15 DIAGNOSIS — R53.83 FATIGUE, UNSPECIFIED TYPE: ICD-10-CM

## 2022-04-15 DIAGNOSIS — Z12.11 SCREEN FOR COLON CANCER: ICD-10-CM

## 2022-04-15 DIAGNOSIS — I10 ESSENTIAL HYPERTENSION: ICD-10-CM

## 2022-04-15 DIAGNOSIS — E66.09 CLASS 1 OBESITY DUE TO EXCESS CALORIES WITHOUT SERIOUS COMORBIDITY WITH BODY MASS INDEX (BMI) OF 31.0 TO 31.9 IN ADULT: ICD-10-CM

## 2022-04-15 DIAGNOSIS — E55.9 VITAMIN D DEFICIENCY: ICD-10-CM

## 2022-04-15 DIAGNOSIS — J30.1 SEASONAL ALLERGIC RHINITIS DUE TO POLLEN: ICD-10-CM

## 2022-04-15 DIAGNOSIS — Z13.220 SCREENING FOR LIPID DISORDERS: ICD-10-CM

## 2022-04-15 PROBLEM — E66.811 CLASS 1 OBESITY DUE TO EXCESS CALORIES WITHOUT SERIOUS COMORBIDITY WITH BODY MASS INDEX (BMI) OF 31.0 TO 31.9 IN ADULT: Status: ACTIVE | Noted: 2020-09-04

## 2022-04-15 PROCEDURE — 99396 PREV VISIT EST AGE 40-64: CPT | Performed by: NURSE PRACTITIONER

## 2022-04-15 RX ORDER — LOSARTAN POTASSIUM 100 MG/1
100 TABLET ORAL DAILY
Qty: 90 TABLET | Refills: 3 | Status: SHIPPED | OUTPATIENT
Start: 2022-04-15

## 2022-04-15 RX ORDER — FLUTICASONE PROPIONATE 50 MCG
2 SPRAY, SUSPENSION (ML) NASAL DAILY
Qty: 18.2 ML | Refills: 11 | Status: SHIPPED | OUTPATIENT
Start: 2022-04-15

## 2022-04-15 NOTE — PROGRESS NOTES
Patient Care Team:  Rehan Ramirez APRN as PCP - General (Family Medicine)     Chief complaint: Patient is in today for a physical     Patient is a 61 y.o. female who presents for her yearly physical exam.     HPI   Review of Systems   Constitutional: Negative for appetite change, chills, fatigue and fever.   HENT: Positive for congestion and rhinorrhea. Negative for ear pain, hearing loss, sinus pressure and sore throat.    Eyes: Negative for itching and visual disturbance (glasses).   Respiratory: Negative for cough, shortness of breath and wheezing.    Cardiovascular: Negative for chest pain, palpitations and leg swelling.   Gastrointestinal: Negative for abdominal pain, blood in stool, constipation, diarrhea, nausea and vomiting.   Endocrine: Negative for cold intolerance, heat intolerance, polydipsia, polyphagia and polyuria.   Genitourinary: Negative for difficulty urinating, dysuria and hematuria.   Musculoskeletal: Negative for arthralgias, back pain, joint swelling, myalgias and neck pain.   Skin: Negative for rash and wound.   Allergic/Immunologic: Positive for environmental allergies. Negative for food allergies.   Neurological: Negative for dizziness, light-headedness, numbness and headaches.   Hematological: Negative for adenopathy. Does not bruise/bleed easily.   Psychiatric/Behavioral: Negative for dysphoric mood and sleep disturbance. The patient is not nervous/anxious.       History  Past Medical History:   Diagnosis Date   • Hypertension       Past Surgical History:   Procedure Laterality Date   •  SECTION  1989   • GASTRIC BYPASS        No Known Allergies   Family History   Problem Relation Age of Onset   • Mental illness Mother    • Cancer Father      Social History     Socioeconomic History   • Marital status:    Tobacco Use   • Smoking status: Never Smoker   • Smokeless tobacco: Never Used   Substance and Sexual Activity   • Alcohol use: Yes     Alcohol/week: 3.0  standard drinks     Types: 3 Glasses of wine per week     Comment: 3 weekly   • Drug use: Never   • Sexual activity: Not Currently     Partners: Female     Birth control/protection: Post-menopausal, Surgical        Current Outpatient Medications:   •  Ascorbic Acid Buffered (BUFFERED VITAMIN C) 1000 MG capsule, Take  by mouth., Disp: , Rfl:   •  Calcium 600-200 MG-UNIT per tablet, Take 1 tablet by mouth Daily., Disp: , Rfl:   •  Cholecalciferol (VITAMIN D3) 50 MCG (2000 UT) capsule, Take 2,000 Units by mouth Daily., Disp: , Rfl:   •  losartan (COZAAR) 100 MG tablet, Take 1 tablet by mouth Daily., Disp: 90 tablet, Rfl: 3  •  Magnesium 250 MG tablet, Take  by mouth., Disp: , Rfl:   •  Multiple Vitamins-Minerals (MULTIVITAMIN ADULT EXTRA C PO), Take  by mouth., Disp: , Rfl:   •  vitamin E 400 UNIT capsule, Take 400 Units by mouth Daily., Disp: , Rfl:   •  fluticasone (Flonase) 50 MCG/ACT nasal spray, 2 sprays into the nostril(s) as directed by provider Daily., Disp: 18.2 mL, Rfl: 11    Immunizations   N/A   Prescribed/Refused   Date     Td/Tdap  (Booster Q 10 yrs)   [x]           Prescribed    []     Refused        []           Flu  (Yearly)   [x]        Prescribed    []     Refused        []           Pneumovax  (1 yr after Prevnar)   [x]        Prescribed    []     Refused        []           Prevnar 13  (1 yr after Pneumo)   [x]        Prescribed    []     Refused        []           Hep B     [x]        Prescribed    []     Refused        []           Shingles  (Age 50 and older)   [x]        Prescribed    []     Refused        []           Immunization History   Administered Date(s) Administered   • COVID-19 (PFIZER) PURPLE CAP 03/12/2021, 04/02/2021, 11/11/2021   • FluLaval/Fluarix/Fluzone >6 09/04/2020   • Flublok 18+yrs 10/16/2021   • Shingrix 09/10/2020, 12/02/2020   • Tdap 09/04/2020     Health Maintenance   Topic Date Due   • COLORECTAL CANCER SCREENING  Never done   • ANNUAL PHYSICAL  11/24/2021   •  INFLUENZA VACCINE  08/01/2022   • PAP SMEAR  10/16/2022   • MAMMOGRAM  10/16/2023   • TDAP/TD VACCINES (2 - Td or Tdap) 09/04/2030   • HEPATITIS C SCREENING  Completed   • COVID-19 Vaccine  Completed   • ZOSTER VACCINE  Completed   • Pneumococcal Vaccine 0-64  Aged Out        Diabetes  [] Yes  [x] No   N/A      Date     Eye Exam     []             []   Complete     []   Recommended Date:  Where:       Foot Exam     []         []   Complete          Obesity Counseling     []       []   Complete     No results found for: HGBA1C, MICROALBUR    Additional Testing      Date     Colorectal Screening       []   N/A   []   Complete    [x]   Ordered     Date:    Where:       Pap      []   N/A   []   Complete   [x]   OB/GYN Date:    Where:       Mammogram        []   N/A   []   Complete   [x]  OB/GYN   []   Ordered Date:    Where:     PSA  (Over age 50)    [x]   N/A   []   Complete   []   Ordered Date:    Where:     US Aorta  (For male smokers, age 65)     [x]   N/A   []   Complete   []   Ordered Date:    Where:     CT for Smoker  (Age 55-75, 30 pk yr)    [x]   N/A   []   Complete   []   Ordered Date:    Where:     Bone Density/DEXA      [x]   N/A   []   Complete   []   Ordered Date:    Follow-up:     Hep. C        []   N/A   [x]   Complete   []   Ordered Date:    Where:     Results for orders placed or performed in visit on 12/02/20   Comprehensive Metabolic Panel    Specimen: Blood   Result Value Ref Range    Glucose 87 65 - 99 mg/dL    BUN 12 6 - 20 mg/dL    Creatinine 0.61 0.57 - 1.00 mg/dL    Sodium 141 136 - 145 mmol/L    Potassium 4.5 3.5 - 5.2 mmol/L    Chloride 104 98 - 107 mmol/L    CO2 29.1 (H) 22.0 - 29.0 mmol/L    Calcium 9.5 8.6 - 10.5 mg/dL    Total Protein 7.3 6.0 - 8.5 g/dL    Albumin 4.30 3.50 - 5.20 g/dL    ALT (SGPT) 24 1 - 33 U/L    AST (SGOT) 29 1 - 32 U/L    Alkaline Phosphatase 93 39 - 117 U/L    Total Bilirubin 0.4 0.0 - 1.2 mg/dL    eGFR Non African Amer 101 >60 mL/min/1.73    Globulin 3.0 gm/dL     "A/G Ratio 1.4 g/dL    BUN/Creatinine Ratio 19.7 7.0 - 25.0    Anion Gap 7.9 5.0 - 15.0 mmol/L   Lipid Panel    Specimen: Blood   Result Value Ref Range    Total Cholesterol 188 0 - 200 mg/dL    Triglycerides 101 0 - 150 mg/dL    HDL Cholesterol 68 (H) 40 - 60 mg/dL    LDL Cholesterol  102 (H) 0 - 100 mg/dL    VLDL Cholesterol 18 5 - 40 mg/dL    LDL/HDL Ratio 1.47    Vitamin D 25 Hydroxy    Specimen: Blood   Result Value Ref Range    25 Hydroxy, Vitamin D 53.3 30.0 - 100.0 ng/ml   Hepatitis C Antibody    Specimen: Blood   Result Value Ref Range    Hepatitis C Ab Non-Reactive Non-Reactive            /65   Pulse 62   Temp 97.5 °F (36.4 °C)   Ht 160.7 cm (63.27\")   Wt 80.3 kg (177 lb)   SpO2 99%   BMI 31.09 kg/m²       Physical Exam  Vitals reviewed.   Constitutional:       Appearance: She is well-developed.   HENT:      Head: Normocephalic and atraumatic.      Right Ear: External ear normal.      Left Ear: External ear normal.   Eyes:      Pupils: Pupils are equal, round, and reactive to light.   Neck:      Thyroid: No thyromegaly.      Vascular: No JVD.   Cardiovascular:      Rate and Rhythm: Normal rate and regular rhythm.      Heart sounds: Normal heart sounds.   Pulmonary:      Effort: Pulmonary effort is normal. No retractions.      Breath sounds: Normal breath sounds.   Chest:      Chest wall: No mass, lacerations, deformity, swelling, tenderness, crepitus or edema.   Breasts: Breasts are symmetrical.       Abdominal:      General: Bowel sounds are normal. There is no distension.      Palpations: Abdomen is soft.      Tenderness: There is no abdominal tenderness. There is no guarding.   Genitourinary:     Comments: deferred  Musculoskeletal:         General: Tenderness (left smallest finger at thr middle joint, jammed finger, some pain) present. Normal range of motion.      Cervical back: Normal range of motion and neck supple.   Lymphadenopathy:      Cervical: No cervical adenopathy.   Skin:     " General: Skin is warm and dry.      Findings: No erythema or rash.   Neurological:      Mental Status: She is alert and oriented to person, place, and time.   Psychiatric:         Behavior: Behavior normal.             Counseling provided on weight management and hypertension.    Diagnoses and all orders for this visit:    Wellness examination  -     CBC & Differential; Future  -     TSH Rfx On Abnormal To Free T4; Future  -     Comprehensive Metabolic Panel; Future  -     Lipid Panel; Future  -     Vitamin D 25 Hydroxy; Future    Essential hypertension  -     Comprehensive Metabolic Panel; Future  -     losartan (COZAAR) 100 MG tablet; Take 1 tablet by mouth Daily.    Class 1 obesity due to excess calories without serious comorbidity with body mass index (BMI) of 31.0 to 31.9 in adult    Screening for lipid disorders  -     Lipid Panel; Future    Vitamin D deficiency  -     Vitamin D 25 Hydroxy; Future    Screen for colon cancer  -     Ambulatory Referral For Screening Colonoscopy    Fatigue, unspecified type  -     CBC & Differential; Future  -     TSH Rfx On Abnormal To Free T4; Future    Jammed finger (interphalangeal joint), left, initial encounter    Seasonal allergic rhinitis due to pollen  -     fluticasone (Flonase) 50 MCG/ACT nasal spray; 2 sprays into the nostril(s) as directed by provider Daily.    The preventative exam has been reviewed in detail.  The patient has been fully counseled on preventative guidelines for vaccines, cancer screenings, and other health maintenance needs. The patient was counseled on maintaining a lifestyle to promote good health and to minimize chronic diseases.  The patient has been assisted with scheduling healthcare procedures for the coming year and given a written document outlining these recommendations. Age-appropriate screening measures have been ordered for the patient today as indicated above.     Patient instructed to check blood pressure daily, record, and bring to  next visit. If the readings run 140/90 or greater, the patient is to call my office or return sooner for evaluation. Pt advised to eat a heart healthy diet and get regular aerobic exercise.    Discussed need for weight management.  I recommend they use a weight loss mayra on their phone, eat no more than [id number] pilar/day, and exercise 30 to 60 minutes 3 times a week.  Discussed weight loss with diet, recommend Weight Watchers or Mediterranean Diet, but stated that whatever method works for this patient is best. The patient agrees with all recommendations at this time. I have discussed a weight loss plan to be determined by this patient.     Will obtain routine labs today and make further recommendations pending results.     For jammed finger I have recommended that she use heat and ice to the area as well as ibuprofen 6 mg 3 times a day with food. Continue current medications. Patient was encouraged to keep me informed of any acute changes, lack of improvement, or any new concerning symptoms.  If patient becomes concerned, or has any worsening symptoms, they are to report either here, urgent treatment, or emergency room. Plan of care discussed with pt. They verbalized understanding and agreement.     She will need a PAP test and Mammogram in October.     RV- Oct for Pap and Mammogram      Rehan Ag Ramirez, APRN   4/15/2022   14:56 EDT          Please note that portions of this document were completed with a voice recognition program.

## 2022-04-16 ENCOUNTER — LAB (OUTPATIENT)
Dept: LAB | Facility: HOSPITAL | Age: 62
End: 2022-04-16

## 2022-04-16 DIAGNOSIS — R53.83 FATIGUE, UNSPECIFIED TYPE: ICD-10-CM

## 2022-04-16 DIAGNOSIS — Z13.220 SCREENING FOR LIPID DISORDERS: ICD-10-CM

## 2022-04-16 DIAGNOSIS — E55.9 VITAMIN D DEFICIENCY: ICD-10-CM

## 2022-04-16 DIAGNOSIS — I10 ESSENTIAL HYPERTENSION: ICD-10-CM

## 2022-04-16 DIAGNOSIS — Z00.00 WELLNESS EXAMINATION: ICD-10-CM

## 2022-04-16 LAB
25(OH)D3 SERPL-MCNC: 56.5 NG/ML (ref 30–100)
ALBUMIN SERPL-MCNC: 4.4 G/DL (ref 3.5–5.2)
ALBUMIN/GLOB SERPL: 1.4 G/DL
ALP SERPL-CCNC: 104 U/L (ref 39–117)
ALT SERPL W P-5'-P-CCNC: 19 U/L (ref 1–33)
ANION GAP SERPL CALCULATED.3IONS-SCNC: 11 MMOL/L (ref 5–15)
AST SERPL-CCNC: 28 U/L (ref 1–32)
BASOPHILS # BLD AUTO: 0.05 10*3/MM3 (ref 0–0.2)
BASOPHILS NFR BLD AUTO: 0.7 % (ref 0–1.5)
BILIRUB SERPL-MCNC: 0.4 MG/DL (ref 0–1.2)
BUN SERPL-MCNC: 13 MG/DL (ref 8–23)
BUN/CREAT SERPL: 18.1 (ref 7–25)
CALCIUM SPEC-SCNC: 9.6 MG/DL (ref 8.6–10.5)
CHLORIDE SERPL-SCNC: 105 MMOL/L (ref 98–107)
CHOLEST SERPL-MCNC: 195 MG/DL (ref 0–200)
CO2 SERPL-SCNC: 26 MMOL/L (ref 22–29)
CREAT SERPL-MCNC: 0.72 MG/DL (ref 0.57–1)
DEPRECATED RDW RBC AUTO: 46.8 FL (ref 37–54)
EGFRCR SERPLBLD CKD-EPI 2021: 95.9 ML/MIN/1.73
EOSINOPHIL # BLD AUTO: 0.17 10*3/MM3 (ref 0–0.4)
EOSINOPHIL NFR BLD AUTO: 2.2 % (ref 0.3–6.2)
ERYTHROCYTE [DISTWIDTH] IN BLOOD BY AUTOMATED COUNT: 13.6 % (ref 12.3–15.4)
GLOBULIN UR ELPH-MCNC: 3.2 GM/DL
GLUCOSE SERPL-MCNC: 92 MG/DL (ref 65–99)
HCT VFR BLD AUTO: 40.9 % (ref 34–46.6)
HDLC SERPL-MCNC: 66 MG/DL (ref 40–60)
HGB BLD-MCNC: 13.3 G/DL (ref 12–15.9)
IMM GRANULOCYTES # BLD AUTO: 0.02 10*3/MM3 (ref 0–0.05)
IMM GRANULOCYTES NFR BLD AUTO: 0.3 % (ref 0–0.5)
LDLC SERPL CALC-MCNC: 115 MG/DL (ref 0–100)
LDLC/HDLC SERPL: 1.72 {RATIO}
LYMPHOCYTES # BLD AUTO: 1.1 10*3/MM3 (ref 0.7–3.1)
LYMPHOCYTES NFR BLD AUTO: 14.5 % (ref 19.6–45.3)
MCH RBC QN AUTO: 30.4 PG (ref 26.6–33)
MCHC RBC AUTO-ENTMCNC: 32.5 G/DL (ref 31.5–35.7)
MCV RBC AUTO: 93.4 FL (ref 79–97)
MONOCYTES # BLD AUTO: 0.77 10*3/MM3 (ref 0.1–0.9)
MONOCYTES NFR BLD AUTO: 10.2 % (ref 5–12)
NEUTROPHILS NFR BLD AUTO: 5.46 10*3/MM3 (ref 1.7–7)
NEUTROPHILS NFR BLD AUTO: 72.1 % (ref 42.7–76)
NRBC BLD AUTO-RTO: 0 /100 WBC (ref 0–0.2)
PLATELET # BLD AUTO: 324 10*3/MM3 (ref 140–450)
PMV BLD AUTO: 10 FL (ref 6–12)
POTASSIUM SERPL-SCNC: 4.3 MMOL/L (ref 3.5–5.2)
PROT SERPL-MCNC: 7.6 G/DL (ref 6–8.5)
RBC # BLD AUTO: 4.38 10*6/MM3 (ref 3.77–5.28)
SODIUM SERPL-SCNC: 142 MMOL/L (ref 136–145)
TRIGL SERPL-MCNC: 76 MG/DL (ref 0–150)
TSH SERPL DL<=0.05 MIU/L-ACNC: 0.97 UIU/ML (ref 0.27–4.2)
VLDLC SERPL-MCNC: 14 MG/DL (ref 5–40)
WBC NRBC COR # BLD: 7.57 10*3/MM3 (ref 3.4–10.8)

## 2022-04-16 PROCEDURE — 80061 LIPID PANEL: CPT

## 2022-04-16 PROCEDURE — 80050 GENERAL HEALTH PANEL: CPT

## 2022-04-16 PROCEDURE — 82306 VITAMIN D 25 HYDROXY: CPT

## 2022-10-19 ENCOUNTER — OFFICE VISIT (OUTPATIENT)
Dept: FAMILY MEDICINE CLINIC | Facility: CLINIC | Age: 62
End: 2022-10-19

## 2022-10-19 VITALS
HEIGHT: 63 IN | RESPIRATION RATE: 18 BRPM | TEMPERATURE: 97.3 F | DIASTOLIC BLOOD PRESSURE: 72 MMHG | HEART RATE: 72 BPM | BODY MASS INDEX: 32.07 KG/M2 | SYSTOLIC BLOOD PRESSURE: 130 MMHG | WEIGHT: 181 LBS | OXYGEN SATURATION: 92 %

## 2022-10-19 DIAGNOSIS — Z01.419 PAP TEST, AS PART OF ROUTINE GYNECOLOGICAL EXAMINATION: ICD-10-CM

## 2022-10-19 DIAGNOSIS — E66.09 CLASS 1 OBESITY DUE TO EXCESS CALORIES WITHOUT SERIOUS COMORBIDITY WITH BODY MASS INDEX (BMI) OF 31.0 TO 31.9 IN ADULT: ICD-10-CM

## 2022-10-19 DIAGNOSIS — I10 ESSENTIAL HYPERTENSION: Primary | ICD-10-CM

## 2022-10-19 PROCEDURE — 99214 OFFICE O/P EST MOD 30 MIN: CPT | Performed by: NURSE PRACTITIONER

## 2022-10-19 RX ORDER — IBUPROFEN 600 MG/1
TABLET ORAL
COMMUNITY
Start: 2022-09-29

## 2022-10-19 NOTE — PROGRESS NOTES
Chief Complaint   Patient presents with   • Follow-up     Follow up for HTN       Subjective   Carlota Santana is a 61 y.o. female    History of Present Illness  Patient is to follow-up on high blood pressure.  She is currently taking losartan 100 mg daily, and denies any significant issues with his medication.  She does also need Pap testing done today.    No Known Allergies  Past Medical History:   Diagnosis Date   • Hypertension       Past Surgical History:   Procedure Laterality Date   •  SECTION  1989   • GASTRIC BYPASS       Social History     Socioeconomic History   • Marital status:    Tobacco Use   • Smoking status: Never   • Smokeless tobacco: Never   Substance and Sexual Activity   • Alcohol use: Yes     Alcohol/week: 3.0 standard drinks     Types: 3 Glasses of wine per week     Comment: 3 weekly   • Drug use: Never   • Sexual activity: Not Currently     Partners: Female     Birth control/protection: Surgical, Post-menopausal        Current Outpatient Medications:   •  Ascorbic Acid Buffered (BUFFERED VITAMIN C) 1000 MG capsule, Take  by mouth., Disp: , Rfl:   •  Calcium 600-200 MG-UNIT per tablet, Take 1 tablet by mouth Daily., Disp: , Rfl:   •  Cholecalciferol (VITAMIN D3) 50 MCG (2000 UT) capsule, Take 2,000 Units by mouth Daily., Disp: , Rfl:   •  fluticasone (Flonase) 50 MCG/ACT nasal spray, 2 sprays into the nostril(s) as directed by provider Daily., Disp: 18.2 mL, Rfl: 11  •  ibuprofen (ADVIL,MOTRIN) 600 MG tablet, TAKE 1 TABLET BY MOUTH WITH MEALS DAILY, Disp: , Rfl:   •  losartan (COZAAR) 100 MG tablet, Take 1 tablet by mouth Daily., Disp: 90 tablet, Rfl: 3  •  Magnesium 250 MG tablet, Take  by mouth., Disp: , Rfl:   •  Multiple Vitamins-Minerals (MULTIVITAMIN ADULT EXTRA C PO), Take  by mouth., Disp: , Rfl:   •  vitamin E 400 UNIT capsule, Take 400 Units by mouth Daily., Disp: , Rfl:      Review of Systems   Constitutional: Negative for chills, fatigue, fever and unexpected  weight change.   Respiratory: Negative for cough, chest tightness, shortness of breath and wheezing.    Cardiovascular: Negative for chest pain, palpitations and leg swelling.   Gastrointestinal: Negative for constipation, diarrhea, nausea and vomiting.   Genitourinary: Negative for difficulty urinating, dysuria, vaginal bleeding, vaginal discharge and vaginal pain.   Skin: Negative for color change and rash.   Neurological: Negative for dizziness, syncope, weakness and headaches.   Psychiatric/Behavioral: Negative for sleep disturbance.       Objective     Vitals:    10/19/22 1405   BP: 140/86   Pulse: 72   Resp: 18   Temp: 97.3 °F (36.3 °C)   SpO2: 92%         10/19/22  1405   Weight: 82.1 kg (181 lb)     Body mass index is 31.79 kg/m².  Results for orders placed or performed in visit on 04/16/22   TSH Rfx On Abnormal To Free T4    Specimen: Blood   Result Value Ref Range    TSH 0.971 0.270 - 4.200 uIU/mL   Comprehensive Metabolic Panel    Specimen: Blood   Result Value Ref Range    Glucose 92 65 - 99 mg/dL    BUN 13 8 - 23 mg/dL    Creatinine 0.72 0.57 - 1.00 mg/dL    Sodium 142 136 - 145 mmol/L    Potassium 4.3 3.5 - 5.2 mmol/L    Chloride 105 98 - 107 mmol/L    CO2 26.0 22.0 - 29.0 mmol/L    Calcium 9.6 8.6 - 10.5 mg/dL    Total Protein 7.6 6.0 - 8.5 g/dL    Albumin 4.40 3.50 - 5.20 g/dL    ALT (SGPT) 19 1 - 33 U/L    AST (SGOT) 28 1 - 32 U/L    Alkaline Phosphatase 104 39 - 117 U/L    Total Bilirubin 0.4 0.0 - 1.2 mg/dL    Globulin 3.2 gm/dL    A/G Ratio 1.4 g/dL    BUN/Creatinine Ratio 18.1 7.0 - 25.0    Anion Gap 11.0 5.0 - 15.0 mmol/L    eGFR 95.9 >60.0 mL/min/1.73   Lipid Panel    Specimen: Blood   Result Value Ref Range    Total Cholesterol 195 0 - 200 mg/dL    Triglycerides 76 0 - 150 mg/dL    HDL Cholesterol 66 (H) 40 - 60 mg/dL    LDL Cholesterol  115 (H) 0 - 100 mg/dL    VLDL Cholesterol 14 5 - 40 mg/dL    LDL/HDL Ratio 1.72    Vitamin D 25 Hydroxy    Specimen: Blood   Result Value Ref Range    25  Hydroxy, Vitamin D 56.5 30.0 - 100.0 ng/ml   CBC Auto Differential    Specimen: Blood   Result Value Ref Range    WBC 7.57 3.40 - 10.80 10*3/mm3    RBC 4.38 3.77 - 5.28 10*6/mm3    Hemoglobin 13.3 12.0 - 15.9 g/dL    Hematocrit 40.9 34.0 - 46.6 %    MCV 93.4 79.0 - 97.0 fL    MCH 30.4 26.6 - 33.0 pg    MCHC 32.5 31.5 - 35.7 g/dL    RDW 13.6 12.3 - 15.4 %    RDW-SD 46.8 37.0 - 54.0 fl    MPV 10.0 6.0 - 12.0 fL    Platelets 324 140 - 450 10*3/mm3    Neutrophil % 72.1 42.7 - 76.0 %    Lymphocyte % 14.5 (L) 19.6 - 45.3 %    Monocyte % 10.2 5.0 - 12.0 %    Eosinophil % 2.2 0.3 - 6.2 %    Basophil % 0.7 0.0 - 1.5 %    Immature Grans % 0.3 0.0 - 0.5 %    Neutrophils, Absolute 5.46 1.70 - 7.00 10*3/mm3    Lymphocytes, Absolute 1.10 0.70 - 3.10 10*3/mm3    Monocytes, Absolute 0.77 0.10 - 0.90 10*3/mm3    Eosinophils, Absolute 0.17 0.00 - 0.40 10*3/mm3    Basophils, Absolute 0.05 0.00 - 0.20 10*3/mm3    Immature Grans, Absolute 0.02 0.00 - 0.05 10*3/mm3    nRBC 0.0 0.0 - 0.2 /100 WBC       Physical Exam  Vitals and nursing note reviewed. Exam conducted with a chaperone present.   Constitutional:       General: She is not in acute distress.     Appearance: Normal appearance. She is well-developed. She is not diaphoretic.   HENT:      Head: Normocephalic and atraumatic.      Right Ear: External ear normal.      Left Ear: External ear normal.      Mouth/Throat:      Pharynx: No oropharyngeal exudate.   Eyes:      General: No scleral icterus.        Right eye: No discharge.         Left eye: No discharge.      Conjunctiva/sclera: Conjunctivae normal.      Pupils: Pupils are equal, round, and reactive to light.   Neck:      Thyroid: No thyromegaly.      Vascular: No JVD.      Trachea: No tracheal deviation.   Cardiovascular:      Rate and Rhythm: Normal rate and regular rhythm.      Heart sounds: Normal heart sounds. No murmur heard.    No friction rub.   Pulmonary:      Effort: Pulmonary effort is normal. No respiratory distress  or retractions.      Breath sounds: Normal breath sounds. No wheezing or rales.   Chest:      Chest wall: No mass, lacerations, deformity, swelling, tenderness, crepitus or edema.   Breasts:     Breasts are symmetrical.   Abdominal:      General: Bowel sounds are normal. There is no distension.      Palpations: Abdomen is soft. There is no mass.      Tenderness: There is no abdominal tenderness. There is no guarding.      Hernia: No hernia is present.   Genitourinary:     Labia:         Right: No rash or lesion.         Left: No rash or lesion.       Urethra: No prolapse or urethral lesion.      Vagina: No vaginal discharge.      Cervix: Friability and cervical bleeding (scant after pap specimine obtained) present.      Uterus: Normal.       Adnexa: Right adnexa normal and left adnexa normal.      Rectum: Normal.   Musculoskeletal:         General: No tenderness or deformity. Normal range of motion.      Cervical back: Normal range of motion and neck supple.   Lymphadenopathy:      Cervical: No cervical adenopathy.   Skin:     General: Skin is warm and dry.      Coloration: Skin is not pale.      Findings: No erythema or rash.   Neurological:      Mental Status: She is alert and oriented to person, place, and time.      Deep Tendon Reflexes: Reflexes are normal and symmetric. Reflexes normal.   Psychiatric:         Behavior: Behavior normal.         Thought Content: Thought content normal.         Assessment & Plan   Problems Addressed this Visit        Cardiac and Vasculature    Essential hypertension - Primary       Endocrine and Metabolic    Class 1 obesity due to excess calories without serious comorbidity with body mass index (BMI) of 31.0 to 31.9 in adult   Other Visit Diagnoses     Pap test, as part of routine gynecological examination        Relevant Orders    LIQUID-BASED PAP SMEAR, [facility] ([facility])      Diagnoses       Codes Comments    Essential hypertension    -  Primary ICD-10-CM: I10  ICD-9-CM:  401.9     Class 1 obesity due to excess calories without serious comorbidity with body mass index (BMI) of 31.0 to 31.9 in adult     ICD-10-CM: E66.09, Z68.31  ICD-9-CM: 278.00, V85.31     Pap test, as part of routine gynecological examination     ICD-10-CM: Z01.419  ICD-9-CM: V76.2       Patient instructed to check blood pressure daily, record, and bring to next visit. If the readings run 140/90 or greater, the patient is to call my office or return sooner for evaluation. Pt advised to eat a heart healthy diet and get regular aerobic exercise.    Discussed need for weight management.  I recommend they use a weight loss mayra on their phone, eat no more than [id number] pilar/day, and exercise 30 to 60 minutes 3 times a week.  Discussed weight loss with diet, recommend Weight Watchers or Mediterranean Diet, but stated that whatever method works for this patient is best. The patient agrees with all recommendations at this time. I have discussed a weight loss plan to be determined by this patient.     Time spent on visit, including counseling, education, reviewing the chart, and any recent test results, was  20      Minutes    Return visit in 6 months    Counseling provided on weight management and hypertension.    Rehan Ramirez, APRN   10/19/2022   16:36 EDT     Please note that portions of this document were completed with a voice recognition program.          Answers for HPI/ROS submitted by the patient on 10/19/2022  What is the primary reason for your visit?: High Blood Pressure

## 2022-10-19 NOTE — PATIENT INSTRUCTIONS
Managing Your Hypertension  Hypertension, also called high blood pressure, is when the force of the blood pressing against the walls of the arteries is too strong. Arteries are blood vessels that carry blood from your heart throughout your body. Hypertension forces the heart to work harder to pump blood and may cause the arteries to become narrow or stiff.  Understanding blood pressure readings  Your personal target blood pressure may vary depending on your medical conditions, your age, and other factors. A blood pressure reading includes a higher number over a lower number. Ideally, your blood pressure should be below 120/80. You should know that:  The first, or top, number is called the systolic pressure. It is a measure of the pressure in your arteries as your heart beats.  The second, or bottom number, is called the diastolic pressure. It is a measure of the pressure in your arteries as the heart relaxes.  Blood pressure is classified into four stages. Based on your blood pressure reading, your health care provider may use the following stages to determine what type of treatment you need, if any. Systolic pressure and diastolic pressure are measured in a unit called mmHg.  Normal  Systolic pressure: below 120.  Diastolic pressure: below 80.  Elevated  Systolic pressure: 120-129.  Diastolic pressure: below 80.  Hypertension stage 1  Systolic pressure: 130-139.  Diastolic pressure: 80-89.  Hypertension stage 2  Systolic pressure: 140 or above.  Diastolic pressure: 90 or above.  How can this condition affect me?  Managing your hypertension is an important responsibility. Over time, hypertension can damage the arteries and decrease blood flow to important parts of the body, including the brain, heart, and kidneys. Having untreated or uncontrolled hypertension can lead to:  A heart attack.  A stroke.  A weakened blood vessel (aneurysm).  Heart failure.  Kidney damage.  Eye damage.  Metabolic syndrome.  Memory and  concentration problems.  Vascular dementia.  What actions can I take to manage this condition?  Hypertension can be managed by making lifestyle changes and possibly by taking medicines. Your health care provider will help you make a plan to bring your blood pressure within a normal range.  Nutrition    Eat a diet that is high in fiber and potassium, and low in salt (sodium), added sugar, and fat. An example eating plan is called the Dietary Approaches to Stop Hypertension (DASH) diet. To eat this way:  Eat plenty of fresh fruits and vegetables. Try to fill one-half of your plate at each meal with fruits and vegetables.  Eat whole grains, such as whole-wheat pasta, brown rice, or whole-grain bread. Fill about one-fourth of your plate with whole grains.  Eat low-fat dairy products.  Avoid fatty cuts of meat, processed or cured meats, and poultry with skin. Fill about one-fourth of your plate with lean proteins such as fish, chicken without skin, beans, eggs, and tofu.  Avoid pre-made and processed foods. These tend to be higher in sodium, added sugar, and fat.  Reduce your daily sodium intake. Most people with hypertension should eat less than 1,500 mg of sodium a day.  Lifestyle    Work with your health care provider to maintain a healthy body weight or to lose weight. Ask what an ideal weight is for you.  Get at least 30 minutes of exercise that causes your heart to beat faster (aerobic exercise) most days of the week. Activities may include walking, swimming, or biking.  Include exercise to strengthen your muscles (resistance exercise), such as weight lifting, as part of your weekly exercise routine. Try to do these types of exercises for 30 minutes at least 3 days a week.  Do not use any products that contain nicotine or tobacco, such as cigarettes, e-cigarettes, and chewing tobacco. If you need help quitting, ask your health care provider.  Control any long-term (chronic) conditions you have, such as high  cholesterol or diabetes.  Identify your sources of stress and find ways to manage stress. This may include meditation, deep breathing, or making time for fun activities.  Alcohol use  Do not drink alcohol if:  Your health care provider tells you not to drink.  You are pregnant, may be pregnant, or are planning to become pregnant.  If you drink alcohol:  Limit how much you use to:  0-1 drink a day for women.  0-2 drinks a day for men.  Be aware of how much alcohol is in your drink. In the [place] one drink equals one 12 oz bottle of beer (355 mL), one 5 oz glass of wine (148 mL), or one 1½ oz glass of hard liquor (44 mL).  Medicines  Your health care provider may prescribe medicine if lifestyle changes are not enough to get your blood pressure under control and if:  Your systolic blood pressure is 130 or higher.  Your diastolic blood pressure is 80 or higher.  Take medicines only as told by your health care provider. Follow the directions carefully. Blood pressure medicines must be taken as told by your health care provider. The medicine does not work as well when you skip doses. Skipping doses also puts you at risk for problems.  Monitoring  Before you monitor your blood pressure:  Do not smoke, drink caffeinated beverages, or exercise within 30 minutes before taking a measurement.  Use the bathroom and empty your bladder (urinate).  Sit quietly for at least 5 minutes before taking measurements.  Monitor your blood pressure at home as told by your health care provider. To do this:  Sit with your back straight and supported.  Place your feet flat on the floor. Do not cross your legs.  Support your arm on a flat surface, such as a table. Make sure your upper arm is at heart level.  Each time you measure, take two or three readings one minute apart and record the results.  You may also need to have your blood pressure checked regularly by your health care provider.  General information  Talk with your health care  provider about your diet, exercise habits, and other lifestyle factors that may be contributing to hypertension.  Review all the medicines you take with your health care provider because there may be side effects or interactions.  Keep all visits as told by your health care provider. Your health care provider can help you create and adjust your plan for managing your high blood pressure.  Where to find more information  National Heart, Lung, and Blood [place]: www.nhlbi.nih.gov  American Heart Association: www.heart.org  Contact a health care provider if:  You think you are having a reaction to medicines you have taken.  You have repeated (recurrent) headaches.  You feel dizzy.  You have swelling in your ankles.  You have trouble with your vision.  Get help right away if:  You develop a severe headache or confusion.  You have unusual weakness or numbness, or you feel faint.  You have severe pain in your chest or abdomen.  You vomit repeatedly.  You have trouble breathing.  These symptoms may represent a serious problem that is an emergency. Do not wait to see if the symptoms will go away. Get medical help right away. Call your local emergency services (911 in the [place] Do not drive yourself to the hospital.  Summary  Hypertension is when the force of blood pumping through your arteries is too strong. If this condition is not controlled, it may put you at risk for serious complications.  Your personal target blood pressure may vary depending on your medical conditions, your age, and other factors. For most people, a normal blood pressure is less than 120/80.  Hypertension is managed by lifestyle changes, medicines, or both.  Lifestyle changes to help manage hypertension include losing weight, eating a healthy, low-sodium diet, exercising more, stopping smoking, and limiting alcohol.  This information is not intended to replace advice given to you by your health care provider. Make sure you discuss any questions  you have with your health care provider.  Document Revised: 01/04/2021 Document Reviewed: 11/17/2020  Elsevier Patient Education © 2022 [employer]

## 2022-10-20 DIAGNOSIS — Z01.419 PAP TEST, AS PART OF ROUTINE GYNECOLOGICAL EXAMINATION: Primary | ICD-10-CM

## 2022-10-24 LAB — REF LAB TEST METHOD: NORMAL

## 2022-11-01 ENCOUNTER — TELEPHONE (OUTPATIENT)
Dept: FAMILY MEDICINE CLINIC | Facility: CLINIC | Age: 62
End: 2022-11-01

## 2022-11-01 NOTE — TELEPHONE ENCOUNTER
HAS CALLED AND STATED PATIENT RECEIVED  A LETTER THAT STATED PATIENT NEEDS TO BE SCHEDULED FOR A NEW DIAGNOSTIC MAMMOGRAM. PATIENT IS REQUESTING FOR REFERRAL/ORDER TO GO TO  FOR THE DIAGNOSTIC MAMMOGRAM.  PATIENT IS REQUESTING A CALL BACK ONCE REFERRAL/ORDER HAS BEEN SENT.    CALL BACK NUMBER [phone]

## 2022-11-02 DIAGNOSIS — Z80.3 FAMILY HISTORY OF BREAST CANCER: ICD-10-CM

## 2022-11-02 DIAGNOSIS — Z12.31 ENCOUNTER FOR SCREENING MAMMOGRAM FOR MALIGNANT NEOPLASM OF BREAST: Primary | ICD-10-CM

## 2022-12-01 ENCOUNTER — TELEPHONE (OUTPATIENT)
Dept: FAMILY MEDICINE CLINIC | Facility: CLINIC | Age: 62
End: 2022-12-01

## 2022-12-01 NOTE — TELEPHONE ENCOUNTER
Attempted to contact, no answer. Left detailed voicemail relaying provider's message       This patient needs additional views on her mammogram.  Hub can relay and document.

## 2023-04-19 ENCOUNTER — OFFICE VISIT (OUTPATIENT)
Dept: FAMILY MEDICINE CLINIC | Facility: CLINIC | Age: 63
End: 2023-04-19
Payer: COMMERCIAL

## 2023-04-19 VITALS
HEIGHT: 63 IN | TEMPERATURE: 98.3 F | DIASTOLIC BLOOD PRESSURE: 76 MMHG | HEART RATE: 76 BPM | WEIGHT: 181.4 LBS | SYSTOLIC BLOOD PRESSURE: 120 MMHG | OXYGEN SATURATION: 97 % | BODY MASS INDEX: 32.14 KG/M2

## 2023-04-19 DIAGNOSIS — Z13.220 SCREENING FOR LIPID DISORDERS: ICD-10-CM

## 2023-04-19 DIAGNOSIS — B35.1 ONYCHOMYCOSIS: ICD-10-CM

## 2023-04-19 DIAGNOSIS — Z12.11 SCREEN FOR COLON CANCER: ICD-10-CM

## 2023-04-19 DIAGNOSIS — B07.0 PLANTAR WART OF LEFT FOOT: ICD-10-CM

## 2023-04-19 DIAGNOSIS — Z00.00 WELLNESS EXAMINATION: Primary | ICD-10-CM

## 2023-04-19 DIAGNOSIS — Z13.29 SCREENING FOR THYROID DISORDER: ICD-10-CM

## 2023-04-19 DIAGNOSIS — E66.09 CLASS 1 OBESITY DUE TO EXCESS CALORIES WITHOUT SERIOUS COMORBIDITY WITH BODY MASS INDEX (BMI) OF 31.0 TO 31.9 IN ADULT: ICD-10-CM

## 2023-04-19 DIAGNOSIS — I10 ESSENTIAL HYPERTENSION: ICD-10-CM

## 2023-04-19 DIAGNOSIS — Z13.0 SCREENING FOR DEFICIENCY ANEMIA: ICD-10-CM

## 2023-04-19 DIAGNOSIS — J30.1 SEASONAL ALLERGIC RHINITIS DUE TO POLLEN: ICD-10-CM

## 2023-04-19 DIAGNOSIS — E55.9 VITAMIN D DEFICIENCY: ICD-10-CM

## 2023-04-19 DIAGNOSIS — M19.042 OSTEOARTHRITIS OF LEFT HAND, UNSPECIFIED OSTEOARTHRITIS TYPE: ICD-10-CM

## 2023-04-19 RX ORDER — DIPHENOXYLATE HYDROCHLORIDE AND ATROPINE SULFATE 2.5; .025 MG/1; MG/1
TABLET ORAL
COMMUNITY

## 2023-04-19 RX ORDER — FLUTICASONE PROPIONATE 50 MCG
2 SPRAY, SUSPENSION (ML) NASAL DAILY
Qty: 18.2 ML | Refills: 11 | Status: SHIPPED | OUTPATIENT
Start: 2023-04-19

## 2023-04-19 RX ORDER — LOSARTAN POTASSIUM 100 MG/1
100 TABLET ORAL DAILY
Qty: 90 TABLET | Refills: 3 | Status: SHIPPED | OUTPATIENT
Start: 2023-04-19

## 2023-04-19 RX ORDER — MELOXICAM 7.5 MG/1
7.5 TABLET ORAL DAILY
Qty: 30 TABLET | Refills: 2 | Status: SHIPPED | OUTPATIENT
Start: 2023-04-19

## 2023-04-19 NOTE — PATIENT INSTRUCTIONS
Managing Your Hypertension  Hypertension, also called high blood pressure, is when the force of the blood pressing against the walls of the arteries is too strong. Arteries are blood vessels that carry blood from your heart throughout your body. Hypertension forces the heart to work harder to pump blood and may cause the arteries to become narrow or stiff.  Understanding blood pressure readings  A blood pressure reading includes a higher number over a lower number:  The first, or top, number is called the systolic pressure. It is a measure of the pressure in your arteries as your heart beats.  The second, or bottom number, is called the diastolic pressure. It is a measure of the pressure in your arteries as the heart relaxes.  For most people, a normal blood pressure is below 120/80. Your personal target blood pressure may vary depending on your medical conditions, your age, and other factors.  Blood pressure is classified into four stages. Based on your blood pressure reading, your health care provider may use the following stages to determine what type of treatment you need, if any. Systolic pressure and diastolic pressure are measured in a unit called millimeters of mercury (mmHg).  Normal  Systolic pressure: below 120.  Diastolic pressure: below 80.  Elevated  Systolic pressure: 120-129.  Diastolic pressure: below 80.  Hypertension stage 1  Systolic pressure: 130-139.  Diastolic pressure: 80-89.  Hypertension stage 2  Systolic pressure: 140 or above.  Diastolic pressure: 90 or above.  How can this condition affect me?  Managing your hypertension is very important. Over time, hypertension can damage the arteries and decrease blood flow to parts of the body, including the brain, heart, and kidneys. Having untreated or uncontrolled hypertension can lead to:  A heart attack.  A stroke.  A weakened blood vessel (aneurysm).  Heart failure.  Kidney damage.  Eye damage.  Memory and concentration problems.  Vascular  dementia.  What actions can I take to manage this condition?  Hypertension can be managed by making lifestyle changes and possibly by taking medicines. Your health care provider will help you make a plan to bring your blood pressure within a normal range. You may be referred for counseling on a healthy diet and physical activity.  Nutrition    Eat a diet that is high in fiber and potassium, and low in salt (sodium), added sugar, and fat. An example eating plan is called the DASH diet. DASH stands for Dietary Approaches to Stop Hypertension. To eat this way:  Eat plenty of fresh fruits and vegetables. Try to fill one-half of your plate at each meal with fruits and vegetables.  Eat whole grains, such as whole-wheat pasta, brown rice, or whole-grain bread. Fill about one-fourth of your plate with whole grains.  Eat low-fat dairy products.  Avoid fatty cuts of meat, processed or cured meats, and poultry with skin. Fill about one-fourth of your plate with lean proteins such as fish, chicken without skin, beans, eggs, and tofu.  Avoid pre-made and processed foods. These tend to be higher in sodium, added sugar, and fat.  Reduce your daily sodium intake. Many people with hypertension should eat less than 1,500 mg of sodium a day.  Lifestyle    Work with your health care provider to maintain a healthy body weight or to lose weight. Ask what an ideal weight is for you.  Get at least 30 minutes of exercise that causes your heart to beat faster (aerobic exercise) most days of the week. Activities may include walking, swimming, or biking.  Include exercise to strengthen your muscles (resistance exercise), such as weight lifting, as part of your weekly exercise routine. Try to do these types of exercises for 30 minutes at least 3 days a week.  Do not use any products that contain nicotine or tobacco. These products include cigarettes, chewing tobacco, and vaping devices, such as e-cigarettes. If you need help quitting, ask your  health care provider.  Control any long-term (chronic) conditions you have, such as high cholesterol or diabetes.  Identify your sources of stress and find ways to manage stress. This may include meditation, deep breathing, or making time for fun activities.  Alcohol use  Do not drink alcohol if:  Your health care provider tells you not to drink.  You are pregnant, may be pregnant, or are planning to become pregnant.  If you drink alcohol:  Limit how much you have to:  0-1 drink a day for women.  0-2 drinks a day for men.  Know how much alcohol is in your drink. In the [place] one drink equals one 12 oz bottle of beer (355 mL), one 5 oz glass of wine (148 mL), or one 1½ oz glass of hard liquor (44 mL).  Medicines  Your health care provider may prescribe medicine if lifestyle changes are not enough to get your blood pressure under control and if:  Your systolic blood pressure is 130 or higher.  Your diastolic blood pressure is 80 or higher.  Take medicines only as told by your health care provider. Follow the directions carefully. Blood pressure medicines must be taken as told by your health care provider. The medicine does not work as well when you skip doses. Skipping doses also puts you at risk for problems.  Monitoring  Before you monitor your blood pressure:  Do not smoke, drink caffeinated beverages, or exercise within 30 minutes before taking a measurement.  Use the bathroom and empty your bladder (urinate).  Sit quietly for at least 5 minutes before taking measurements.  Monitor your blood pressure at home as told by your health care provider. To do this:  Sit with your back straight and supported.  Place your feet flat on the floor. Do not cross your legs.  Support your arm on a flat surface, such as a table. Make sure your upper arm is at heart level.  Each time you measure, take two or three readings one minute apart and record the results.  You may also need to have your blood pressure checked regularly by  your health care provider.  General information  Talk with your health care provider about your diet, exercise habits, and other lifestyle factors that may be contributing to hypertension.  Review all the medicines you take with your health care provider because there may be side effects or interactions.  Keep all follow-up visits. Your health care provider can help you create and adjust your plan for managing your high blood pressure.  Where to find more information  National Heart, Lung, and Blood [place]: www.nhlbi.nih.gov  American Heart Association: www.heart.org  Contact a health care provider if:  You think you are having a reaction to medicines you have taken.  You have repeated (recurrent) headaches.  You feel dizzy.  You have swelling in your ankles.  You have trouble with your vision.  Get help right away if:  You develop a severe headache or confusion.  You have unusual weakness or numbness, or you feel faint.  You have severe pain in your chest or abdomen.  You vomit repeatedly.  You have trouble breathing.  These symptoms may be an emergency. Get help right away. Call 911.  Do not wait to see if the symptoms will go away.  Do not drive yourself to the hospital.  Summary  Hypertension is when the force of blood pumping through your arteries is too strong. If this condition is not controlled, it may put you at risk for serious complications.  Your personal target blood pressure may vary depending on your medical conditions, your age, and other factors. For most people, a normal blood pressure is less than 120/80.  Hypertension is managed by lifestyle changes, medicines, or both.  Lifestyle changes to help manage hypertension include losing weight, eating a healthy, low-sodium diet, exercising more, stopping smoking, and limiting alcohol.  This information is not intended to replace advice given to you by your health care provider. Make sure you discuss any questions you have with your health care  "provider.  Document Revised: 09/01/2022 Document Reviewed: 09/01/2022  [employer] Patient Education © 2022 [employer]  DASH Eating Plan  DASH stands for Dietary Approaches to Stop Hypertension. The DASH eating plan is a healthy eating plan that has been shown to:  Reduce high blood pressure (hypertension).  Reduce your risk for type 2 diabetes, heart disease, and stroke.  Help with weight loss.  What are tips for following this plan?  Reading food labels  Check food labels for the amount of salt (sodium) per serving. Choose foods with less than 5 percent of the Daily Value of sodium. Generally, foods with less than 300 milligrams (mg) of sodium per serving fit into this eating plan.  To find whole grains, look for the word \"whole\" as the first word in the ingredient list.  Shopping  Buy products labeled as \"low-sodium\" or \"no salt added.\"  Buy fresh foods. Avoid canned foods and pre-made or frozen meals.  Cooking  Avoid adding salt when cooking. Use salt-free seasonings or herbs instead of table salt or sea salt. Check with your health care provider or pharmacist before using salt substitutes.  Do not coley foods. Cook foods using healthy methods such as baking, boiling, grilling, roasting, and broiling instead.  Cook with heart-healthy oils, such as olive, canola, avocado, soybean, or sunflower oil.  Meal planning    Eat a balanced diet that includes:  4 or more servings of fruits and 4 or more servings of vegetables each day. Try to fill one-half of your plate with fruits and vegetables.  6-8 servings of whole grains each day.  Less than 6 oz (170 g) of lean meat, poultry, or fish each day. A 3-oz (85-g) serving of meat is about the same size as a deck of cards. One egg equals 1 oz (28 g).  2-3 servings of low-fat dairy each day. One serving is 1 cup (237 mL).  1 serving of nuts, seeds, or beans 5 times each week.  2-3 servings of heart-healthy fats. Healthy fats called omega-3 fatty acids are found in foods such " as walnuts, flaxseeds, fortified milks, and eggs. These fats are also found in cold-water fish, such as sardines, salmon, and mackerel.  Limit how much you eat of:  Canned or prepackaged foods.  Food that is high in trans fat, such as some fried foods.  Food that is high in saturated fat, such as fatty meat.  Desserts and other sweets, sugary drinks, and other foods with added sugar.  Full-fat dairy products.  Do not salt foods before eating.  Do not eat more than 4 egg yolks a week.  Try to eat at least 2 vegetarian meals a week.  Eat more home-cooked food and less restaurant, buffet, and fast food.  Lifestyle  When eating at a restaurant, ask that your food be prepared with less salt or no salt, if possible.  If you drink alcohol:  Limit how much you use to:  0-1 drink a day for women who are not pregnant.  0-2 drinks a day for men.  Be aware of how much alcohol is in your drink. In the [place] one drink equals one 12 oz bottle of beer (355 mL), one 5 oz glass of wine (148 mL), or one 1½ oz glass of hard liquor (44 mL).  General information  Avoid eating more than 2,300 mg of salt a day. If you have hypertension, you may need to reduce your sodium intake to 1,500 mg a day.  Work with your health care provider to maintain a healthy body weight or to lose weight. Ask what an ideal weight is for you.  Get at least 30 minutes of exercise that causes your heart to beat faster (aerobic exercise) most days of the week. Activities may include walking, swimming, or biking.  Work with your health care provider or dietitian to adjust your eating plan to your individual calorie needs.  What foods should I eat?  Fruits  All fresh, dried, or frozen fruit. Canned fruit in natural juice (without added sugar).  Vegetables  Fresh or frozen vegetables (raw, steamed, roasted, or grilled). Low-sodium or reduced-sodium tomato and vegetable juice. Low-sodium or reduced-sodium tomato sauce and tomato paste. Low-sodium or reduced-sodium  canned vegetables.  Grains  Whole-grain or whole-wheat bread. Whole-grain or whole-wheat pasta. Brown rice. Oatmeal. Quinoa. Bulgur. Whole-grain and low-sodium cereals. Aide bread. Low-fat, low-sodium crackers. Whole-wheat flour tortillas.  Meats and other proteins  Skinless chicken or turkey. Ground chicken or turkey. Pork with fat trimmed off. Fish and seafood. Egg whites. Dried beans, peas, or lentils. Unsalted nuts, nut butters, and seeds. Unsalted canned beans. Lean cuts of beef with fat trimmed off. Low-sodium, lean precooked or cured meat, such as sausages or meat loaves.  Dairy  Low-fat (1%) or fat-free (skim) milk. Reduced-fat, low-fat, or fat-free cheeses. Nonfat, low-sodium ricotta or cottage cheese. Low-fat or nonfat yogurt. Low-fat, low-sodium cheese.  Fats and oils  Soft margarine without trans fats. Vegetable oil. Reduced-fat, low-fat, or light mayonnaise and salad dressings (reduced-sodium). Canola, safflower, olive, avocado, soybean, and sunflower oils. Avocado.  Seasonings and condiments  Herbs. Spices. Seasoning mixes without salt.  Other foods  Unsalted popcorn and pretzels. Fat-free sweets.  The items listed above may not be a complete list of foods and beverages you can eat. Contact a dietitian for more information.  What foods should I avoid?  Fruits  Canned fruit in a light or heavy syrup. Fried fruit. Fruit in cream or butter sauce.  Vegetables  Creamed or fried vegetables. Vegetables in a cheese sauce. Regular canned vegetables (not low-sodium or reduced-sodium). Regular canned tomato sauce and paste (not low-sodium or reduced-sodium). Regular tomato and vegetable juice (not low-sodium or reduced-sodium). Pickles. Olives.  Grains  Baked goods made with fat, such as croissants, muffins, or some breads. Dry pasta or rice meal packs.  Meats and other proteins  Fatty cuts of meat. Ribs. Fried meat. Guo. Bologna, salami, and other precooked or cured meats, such as sausages or meat loaves. Fat  from the back of a pig (fatback). Bratwurst. Salted nuts and seeds. Canned beans with added salt. Canned or smoked fish. Whole eggs or egg yolks. Chicken or turkey with skin.  Dairy  Whole or 2% milk, cream, and half-and-half. Whole or full-fat cream cheese. Whole-fat or sweetened yogurt. Full-fat cheese. Nondairy creamers. Whipped toppings. Processed cheese and cheese spreads.  Fats and oils  Butter. Stick margarine. Lard. Shortening. Ghee. Guo fat. Tropical oils, such as coconut, palm kernel, or palm oil.  Seasonings and condiments  Onion salt, garlic salt, seasoned salt, table salt, and sea salt. Worcestershire sauce. Tartar sauce. Barbecue sauce. Teriyaki sauce. Soy sauce, including reduced-sodium. Steak sauce. Canned and packaged gravies. Fish sauce. Oyster sauce. Cocktail sauce. Store-bought horseradish. Ketchup. Mustard. Meat flavorings and tenderizers. Bouillon cubes. Hot sauces. Pre-made or packaged marinades. Pre-made or packaged taco seasonings. Relishes. Regular salad dressings.  Other foods  Salted popcorn and pretzels.  The items listed above may not be a complete list of foods and beverages you should avoid. Contact a dietitian for more information.  Where to find more information  National Heart, Lung, and Blood [place]: www.nhlbi.nih.gov  American Heart Association: www.heart.org  Academy of Nutrition and Dietetics: www.eatright.org  National Kidney Foundation: www.kidney.org  Summary  The DASH eating plan is a healthy eating plan that has been shown to reduce high blood pressure (hypertension). It may also reduce your risk for type 2 diabetes, heart disease, and stroke.  When on the DASH eating plan, aim to eat more fresh fruits and vegetables, whole grains, lean proteins, low-fat dairy, and heart-healthy fats.  With the DASH eating plan, you should limit salt (sodium) intake to 2,300 mg a day. If you have hypertension, you may need to reduce your sodium intake to 1,500 mg a day.  Work with  your health care provider or dietitian to adjust your eating plan to your individual calorie needs.  This information is not intended to replace advice given to you by your health care provider. Make sure you discuss any questions you have with your health care provider.  Document Revised: 11/20/2020 Document Reviewed: 11/20/2020  [employer] Patient Education © 2022 [employer]  Obesity, Adult  Obesity is the condition of having too much total body fat. Being overweight or obese means that your weight is greater than what is considered healthy for your body size. Obesity is determined by a measurement called BMI (body mass index). BMI is an estimate of body fat and is calculated from height and weight. For adults, a BMI of 30 or higher is considered obese.  Obesity can lead to other health concerns and major illnesses, including:  Stroke.  Coronary artery disease (CAD).  Type 2 diabetes.  Some types of cancer, including cancers of the colon, breast, uterus, and gallbladder.  High blood pressure (hypertension).  High cholesterol.  Gallbladder stones.  Obesity can also contribute to:  Osteoarthritis.  Sleep apnea.  Infertility problems.  What are the causes?  Common causes of this condition include:  Eating daily meals that are high in calories, sugar, and fat.  Drinking high amounts of sugar-sweetened beverages, such as soft drinks.  Being born with genes that may make you more likely to become obese.  Having a medical condition that causes obesity, including:  Hypothyroidism.  Polycystic ovarian syndrome (PCOS).  Binge-eating disorder.  Cushing syndrome.  Taking certain medicines, such as steroids, antidepressants, and seizure medicines.  Not being physically active (sedentary lifestyle).  Not getting enough sleep.  What increases the risk?  The following factors may make you more likely to develop this condition:  Having a family history of obesity.  Living in an area with limited access to:  [place], recreation  centers, or sidewalks.  Healthy food choices, such as grocery stores and farmers' markets.  What are the signs or symptoms?  The main sign of this condition is having too much body fat.  How is this diagnosed?  This condition is diagnosed based on:  Your BMI. If you are an adult with a BMI of 30 or higher, you are considered obese.  Your waist circumference. This measures the distance around your waistline.  Your skinfold thickness. Your health care provider may gently pinch a fold of your skin and measure it.  You may have other tests to check for underlying conditions.  How is this treated?  Treatment for this condition often includes changing your lifestyle. Treatment may include some or all of the following:  Dietary changes. This may include developing a healthy meal plan.  Regular physical activity. This may include activity that causes your heart to beat faster (aerobic exercise) and strength training. Work with your health care provider to design an exercise program that works for you.  Medicine to help you lose weight if you are unable to lose one pound a week after six weeks of healthy eating and more physical activity.  Treating conditions that cause the obesity (underlying conditions).  Surgery. Surgical options may include gastric banding and gastric bypass. Surgery may be done if:  Other treatments have not helped to improve your condition.  You have a BMI of 40 or higher.  You have life-threatening health problems related to obesity.  Follow these instructions at home:  Eating and drinking    Follow recommendations from your health care provider about what you eat and drink. Your health care provider may advise you to:  Limit fast food, sweets, and processed snack foods.  Choose low-fat options, such as low-fat milk instead of whole milk.  Eat five or more servings of fruits or vegetables every day.  Choose healthy foods when you eat out.  Keep low-fat snacks available.  Limit sugary drinks, such as  soda, fruit juice, sweetened iced tea, and flavored milk.  Drink enough water to keep your urine pale yellow.  Do not follow a fad diet. Fad diets can be unhealthy and even dangerous.  Other healthful choices include:  Eat at home more often. This gives you more control over what you eat.  Learn to read food labels. This will help you understand how much food is considered one serving.  Learn what a healthy serving size is.  Physical activity  Exercise regularly, as told by your health care provider.  Most adults should get up to 150 minutes of moderate-intensity exercise every week.  Ask your health care provider what types of exercise are safe for you and how often you should exercise.  Warm up and stretch before being active.  Cool down and stretch after being active.  Rest between periods of activity.  Lifestyle  Work with your health care provider and a dietitian to set a weight-loss goal that is healthy and reasonable for you.  Limit your screen time.  Find ways to reward yourself that do not involve food.  Do not drink alcohol if:  Your health care provider tells you not to drink.  You are pregnant, may be pregnant, or are planning to become pregnant.  If you drink alcohol:  Limit how much you have to:  0-1 drink a day for women.  0-2 drinks a day for men.  Know how much alcohol is in your drink. In the [place] one drink equals one 12 oz bottle of beer (355 mL), one 5 oz glass of wine (148 mL), or one 1½ oz glass of hard liquor (44 mL).  General instructions  Keep a weight-loss journal to keep track of the food you eat and how much exercise you get.  Take over-the-counter and prescription medicines only as told by your health care provider.  Take vitamins and supplements only as told by your health care provider.  Consider joining a support group. Your health care provider may be able to recommend a support group.  Pay attention to your mental health as obesity can lead to depression or self esteem  issues.  Keep all follow-up visits. This is important.  Contact a health care provider if:  You are unable to meet your weight-loss goal after six weeks of dietary and lifestyle changes.  You have trouble breathing.  Summary  Obesity is the condition of having too much total body fat.  Being overweight or obese means that your weight is greater than what is considered healthy for your body size.  Work with your health care provider and a dietitian to set a weight-loss goal that is healthy and reasonable for you.  Exercise regularly, as told by your health care provider. Ask your health care provider what types of exercise are safe for you and how often you should exercise.  This information is not intended to replace advice given to you by your health care provider. Make sure you discuss any questions you have with your health care provider.  Document Revised: 07/26/2022 Document Reviewed: 07/26/2022  [employer] Patient Education © 2022 [employer]

## 2023-04-19 NOTE — PROGRESS NOTES
Patient Care Team:  Rehan Ramirez APRN as PCP - General (Family Medicine)     Chief complaint: Patient is in today for a physical     Patient is a 62 y.o. female who presents for her yearly physical exam.     HPI patient today for routine yearly physical exam and for follow-up on high blood pressure.  She is currently on losartan 100 mg daily.  She also does have complaints of some pain in her left little finger.  She notices it when forced when she works.  This is been going on since October to November of last year.  She did have an x-ray of her left hand on November 9 of last year which showed some minimal arthritic type changes at that time.  She has not taken anything for this hand pain.  She reports she did go to therapy but it did not help.    Review of Systems   Constitutional: Negative for appetite change, chills, fatigue and fever.   HENT: Negative for congestion, ear pain, hearing loss, rhinorrhea, sinus pressure and sore throat.    Eyes: Negative for itching and visual disturbance.   Respiratory: Negative for cough, shortness of breath and wheezing.    Cardiovascular: Negative for chest pain, palpitations and leg swelling.   Gastrointestinal: Negative for abdominal pain, blood in stool, constipation, diarrhea, nausea and vomiting.   Endocrine: Negative for cold intolerance, heat intolerance, polydipsia, polyphagia and polyuria.   Genitourinary: Negative for difficulty urinating, dysuria and hematuria.   Musculoskeletal: Positive for arthralgias (left little finger.). Negative for back pain, joint swelling, myalgias and neck pain.   Skin: Negative for rash and wound.        Plantar wart left foot   Allergic/Immunologic: Negative for environmental allergies and food allergies.   Neurological: Negative for dizziness, light-headedness, numbness and headaches.   Hematological: Negative for adenopathy. Does not bruise/bleed easily.   Psychiatric/Behavioral: Negative for dysphoric mood and sleep  disturbance. The patient is not nervous/anxious.       History  Past Medical History:   Diagnosis Date   • Hypertension       Past Surgical History:   Procedure Laterality Date   •  SECTION  1989   • GASTRIC BYPASS        No Known Allergies   Family History   Problem Relation Age of Onset   • Mental illness Mother    • Cancer Father      Social History     Socioeconomic History   • Marital status:    Tobacco Use   • Smoking status: Never     Passive exposure: Never   • Smokeless tobacco: Never   Vaping Use   • Vaping Use: Never used   Substance and Sexual Activity   • Alcohol use: Yes     Alcohol/week: 3.0 standard drinks     Types: 3 Glasses of wine per week     Comment: 3 weekly   • Drug use: Never   • Sexual activity: Not Currently     Partners: Male     Birth control/protection: Surgical, Post-menopausal, Same-sex partner        Current Outpatient Medications:   •  Ascorbic Acid Buffered (BUFFERED VITAMIN C) 1000 MG capsule, Take  by mouth., Disp: , Rfl:   •  Cholecalciferol (VITAMIN D3) 50 MCG ( UT) capsule, Take 1 capsule by mouth Daily., Disp: , Rfl:   •  fluticasone (Flonase) 50 MCG/ACT nasal spray, 2 sprays into the nostril(s) as directed by provider Daily., Disp: 18.2 mL, Rfl: 11  •  losartan (COZAAR) 100 MG tablet, Take 1 tablet by mouth Daily., Disp: 90 tablet, Rfl: 3  •  Magnesium 250 MG tablet, Take  by mouth., Disp: , Rfl:   •  Multiple Vitamins-Minerals (MULTIVITAMIN ADULT EXTRA C PO), Take  by mouth., Disp: , Rfl:   •  multivitamin (THERAGRAN) tablet tablet, Take  by mouth., Disp: , Rfl:   •  vitamin E 400 UNIT capsule, Take 1 capsule by mouth Daily., Disp: , Rfl:   •  meloxicam (Mobic) 7.5 MG tablet, Take 1 tablet by mouth Daily., Disp: 30 tablet, Rfl: 2    Immunizations   N/A   Prescribed/Refused   Date     Td/Tdap  (Booster Q 10 yrs)   [x]           Prescribed    []     Refused        []           Flu  (Yearly)   [x]        Prescribed    []     Refused        []            Pneumonia      [x]        Prescribed    []     Refused        []                 Hep B     [x]        Prescribed    []     Refused        []           Shingles  (Age 50 and older)   [x]        Prescribed    []     Refused        []           Immunization History   Administered Date(s) Administered   • COVID-19 (PFIZER) BIVALENT BOOSTER 12+YRS 10/13/2022   • COVID-19 (PFIZER) PURPLE CAP 03/12/2021, 04/02/2021, 11/11/2021   • Covid-19 (Pfizer) Gray Cap 04/15/2022   • FluLaval/Fluzone >6mos 09/04/2020   • Fluad Quad 65+ 10/16/2021   • Flublok 18+yrs 10/16/2021   • Influenza, Unspecified 10/13/2022   • Shingrix 09/10/2020, 12/02/2020   • Tdap 09/04/2020     Health Maintenance   Topic Date Due   • COLORECTAL CANCER SCREENING  Never done   • ANNUAL PHYSICAL  04/15/2023   • INFLUENZA VACCINE  08/01/2023   • MAMMOGRAM  10/18/2024   • PAP SMEAR  10/20/2025   • TDAP/TD VACCINES (2 - Td or Tdap) 09/04/2030   • HEPATITIS C SCREENING  Completed   • COVID-19 Vaccine  Completed   • ZOSTER VACCINE  Completed   • Pneumococcal Vaccine 0-64  Aged Out        Diabetes  [] Yes  [x] No   N/A      Date     Eye Exam     []             []   Complete     []   Recommended Date:  Where:       Foot Exam     []         []   Complete          Obesity Counseling     []       []   Complete     No results found for: HGBA1C, MICROALBUR    Additional Testing      Date     Colorectal Screening       []   N/A   []   Complete    [x]   Ordered     Date:    Where:       Pap      []   N/A   [x]   Complete   []   OB/GYN Date:    Where:       Mammogram        []   N/A   [x]   Complete   []  OB/GYN   []   Ordered Date:    Where:     PSA  (Over age 50)    [x]   N/A   []   Complete   []   Ordered Date:    Where:     US Aorta  (For male smokers, age 65)     [x]   N/A   []   Complete   []   Ordered Date:    Where:     CT for Smoker  (Age 55-75, 30 pk yr)    [x]   N/A   []   Complete   []   Ordered Date:    Where:     Bone Density/DEXA      [x]   N/A   []    Complete   []   Ordered Date:    Follow-up:     Hep. C        []   N/A   [x]   Complete   []   Ordered Date:    Where:     Results for orders placed or performed in visit on 10/19/22   LIQUID-BASED PAP SMEAR, [facility] ([facility])    Specimen: Cervix; ThinPrep Vial   Result Value Ref Range    Reference Lab Report       [facility]  [address] [place] [address]  Phone: [phone] or [phone]  Fax: [phone]  Sotero Brewster M.D., Medical Director    PATIENT NAME                                     LABORATORY NO[address]  [id number]   KVNG BLANCA                                  [id number]  [id number]                                 AGE                    SEX   SSN              CLIENT REF #  [facility]             61        1960      F                      [id number]    RD                                         REQUESTING M.D.           ATTENDING M.D.         COPY TO.  [id number]                      [place] [address]                        DATE COLLECTED            DATE RECEIVED          DATE REPORTED  10/20/2022                10/20/2022             10/24/2022    ThinPrep Pap with [facility]    DIAGNOSIS:  Negative for intraepithelial lesion or malignancy    Multiple factors can influence accuracy of Pap  tests; therefore, screening at  regular intervals is necessary for early cancer detection.      SPECIMEN ADEQUACY:  SATISFACTORY FOR EVALUATION  Transformation zone is present.  SOURCE OF SPECIMEN:       CERVICAL  SLIDES:  1  CLINICAL HISTORY:  Pap test, as part of routine gynecological examination    HPV  HR-HPV POOL: Negative    The Aptima HPV assay is an in vitro nucleic acid amplification test for the  qualitative detection of E6/E7 viral messenger RNA from 14 high risk types of  HPV in cervical specimens. The high risk HPV types detected include: 16, 18,  31, 33, 35, 39, 45, 51, 52, 56, 58, 59, 66,  "68    CYTOTECHNOLOGIST:             LOUIE CT ([facility])    CPT CODES:  41502, 30605              /76 (BP Location: Left arm, Patient Position: Sitting, Cuff Size: Adult)   Pulse 76   Temp 98.3 °F (36.8 °C) (Temporal)   Ht 160.7 cm (63.27\")   Wt 82.3 kg (181 lb 6.4 oz)   SpO2 97%   BMI 31.86 kg/m²       Physical Exam  Vitals reviewed.   Constitutional:       Appearance: She is well-developed.   HENT:      Head: Normocephalic and atraumatic.      Right Ear: External ear normal.      Left Ear: External ear normal.   Eyes:      Pupils: Pupils are equal, round, and reactive to light.   Neck:      Thyroid: No thyromegaly.      Vascular: No JVD.   Cardiovascular:      Rate and Rhythm: Normal rate and regular rhythm.      Heart sounds: Normal heart sounds.   Pulmonary:      Effort: Pulmonary effort is normal. No retractions.      Breath sounds: Normal breath sounds.   Chest:      Chest wall: No mass, lacerations, deformity, swelling, tenderness, crepitus or edema.   Breasts:     Breasts are symmetrical.   Abdominal:      General: Bowel sounds are normal. There is no distension.      Palpations: Abdomen is soft.      Tenderness: There is no abdominal tenderness. There is no guarding.   Genitourinary:     Comments: deferred  Musculoskeletal:         General: Normal range of motion.      Cervical back: Normal range of motion and neck supple.   Lymphadenopathy:      Cervical: No cervical adenopathy.   Skin:     General: Skin is warm and dry.      Findings: No erythema or rash.   Neurological:      Mental Status: She is alert and oriented to person, place, and time.   Psychiatric:         Behavior: Behavior normal.             Counseling provided on weight management and Osteoarthritis.    Diagnoses and all orders for this visit:    Wellness examination  -     Comprehensive Metabolic Panel; Future  -     Lipid Panel; Future  -     TSH Rfx On Abnormal To Free T4; Future  -     CBC (No Diff); Future    Plantar wart of left " foot  -     Ambulatory Referral to Podiatry    Onychomycosis  -     Ambulatory Referral to Podiatry    Essential hypertension  -     Comprehensive Metabolic Panel; Future  -     losartan (COZAAR) 100 MG tablet; Take 1 tablet by mouth Daily.    Class 1 obesity due to excess calories without serious comorbidity with body mass index (BMI) of 31.0 to 31.9 in adult    Screening for lipid disorders  -     Lipid Panel; Future    Screening for thyroid disorder  -     TSH Rfx On Abnormal To Free T4; Future    Screening for deficiency anemia  -     CBC (No Diff); Future    Vitamin D deficiency  -     Vitamin D,25-Hydroxy; Future    Seasonal allergic rhinitis due to pollen  -     fluticasone (Flonase) 50 MCG/ACT nasal spray; 2 sprays into the nostril(s) as directed by provider Daily.    Screen for colon cancer  -     Ambulatory Referral For Screening Colonoscopy    Osteoarthritis of left hand, unspecified osteoarthritis type  -     meloxicam (Mobic) 7.5 MG tablet; Take 1 tablet by mouth Daily.    Other orders  -     multivitamin (THERAGRAN) tablet tablet; Take  by mouth.    The preventative exam has been reviewed in detail.  Counseling/Anticipatory Guidance discussion included discussing nutrition needs, physical activity, healthy weight, injury prevention, misuse of tobacco, alcohol and drugs, sexual behavior, dental health, mental health, immunizations, and needed screenings has been discussed. The patient has been assisted with scheduling healthcare procedures for the coming year and given a written document outlining these recommendations. Age-appropriate screening measures have been ordered for the patient today as indicated above.    Patient instructed to check blood pressure daily, record, and bring to next visit. If the readings run 140/90 or greater, the patient is to call my office or return sooner for evaluation. Pt advised to eat a heart healthy diet and get regular aerobic exercise.    Discussed need for weight  management.  I recommend they use a weight loss mayra on their phone, eat no more than 4557-1133 pilar/day, and exercise 30 to 60 minutes 3 times a week.  Discussed weight loss with diet, recommend Weight Watchers or Mediterranean Diet, but stated that whatever method works for this patient is best. The patient agrees with all recommendations at this time. I have discussed a weight loss plan to be determined by this patient.     Will obtain routine labs today and make further recommendations pending results. All lab results are automatically released to [email or web address] immediately when they return whether they have been reviewed or not. The patient will be notified about the results, regardless of the findings. If they have not been contacted by the office within 2 weeks after the test has been performed, I want them to contact us to learn about the results.    For arthritic type pains were going to have her to use a trial of meloxicam 7.5 mg daily with food. Patient was encouraged to keep me informed of any acute changes, lack of improvement, or any new concerning symptoms.  If patient becomes concerned, or has any worsening symptoms, they are to report either here, urgent treatment, or emergency room. Plan of care discussed with pt. They verbalized understanding and agreement.     RV- 3 months       Rehan Ramirez, APRN   4/19/2023   15:03 EDT          Please note that portions of this document were completed with a voice recognition program.     At [facility], we believe that sharing information builds trust and better relationships. You are receiving this note because you are receiving care at [facility] or have recently visited. It is possible you will see health information before a provider has talked with you about it. This kind of information can be easy to misunderstand. To help you fully understand what it means for your health, we urge you to discuss this note with your provider.

## 2023-05-05 ENCOUNTER — LAB (OUTPATIENT)
Dept: LAB | Facility: HOSPITAL | Age: 63
End: 2023-05-05
Payer: COMMERCIAL

## 2023-05-05 DIAGNOSIS — E55.9 VITAMIN D DEFICIENCY: ICD-10-CM

## 2023-05-05 DIAGNOSIS — Z13.29 SCREENING FOR THYROID DISORDER: ICD-10-CM

## 2023-05-05 DIAGNOSIS — Z00.00 WELLNESS EXAMINATION: ICD-10-CM

## 2023-05-05 DIAGNOSIS — Z13.0 SCREENING FOR DEFICIENCY ANEMIA: ICD-10-CM

## 2023-05-05 DIAGNOSIS — Z13.220 SCREENING FOR LIPID DISORDERS: ICD-10-CM

## 2023-05-05 DIAGNOSIS — I10 ESSENTIAL HYPERTENSION: ICD-10-CM

## 2023-05-05 LAB
25(OH)D3 SERPL-MCNC: 54.2 NG/ML (ref 30–100)
ALBUMIN SERPL-MCNC: 4.2 G/DL (ref 3.5–5.2)
ALBUMIN/GLOB SERPL: 1.4 G/DL
ALP SERPL-CCNC: 92 U/L (ref 39–117)
ALT SERPL W P-5'-P-CCNC: 19 U/L (ref 1–33)
ANION GAP SERPL CALCULATED.3IONS-SCNC: 9.6 MMOL/L (ref 5–15)
AST SERPL-CCNC: 31 U/L (ref 1–32)
BILIRUB SERPL-MCNC: 0.4 MG/DL (ref 0–1.2)
BUN SERPL-MCNC: 14 MG/DL (ref 8–23)
BUN/CREAT SERPL: 21.2 (ref 7–25)
CALCIUM SPEC-SCNC: 10 MG/DL (ref 8.6–10.5)
CHLORIDE SERPL-SCNC: 108 MMOL/L (ref 98–107)
CHOLEST SERPL-MCNC: 181 MG/DL (ref 0–200)
CO2 SERPL-SCNC: 26.4 MMOL/L (ref 22–29)
CREAT SERPL-MCNC: 0.66 MG/DL (ref 0.57–1)
DEPRECATED RDW RBC AUTO: 46.2 FL (ref 37–54)
EGFRCR SERPLBLD CKD-EPI 2021: 99.9 ML/MIN/1.73
ERYTHROCYTE [DISTWIDTH] IN BLOOD BY AUTOMATED COUNT: 13.9 % (ref 12.3–15.4)
GLOBULIN UR ELPH-MCNC: 3.1 GM/DL
GLUCOSE SERPL-MCNC: 90 MG/DL (ref 65–99)
HCT VFR BLD AUTO: 37.5 % (ref 34–46.6)
HDLC SERPL-MCNC: 62 MG/DL (ref 40–60)
HGB BLD-MCNC: 12.6 G/DL (ref 12–15.9)
LDLC SERPL CALC-MCNC: 103 MG/DL (ref 0–100)
LDLC/HDLC SERPL: 1.64 {RATIO}
MCH RBC QN AUTO: 30.7 PG (ref 26.6–33)
MCHC RBC AUTO-ENTMCNC: 33.6 G/DL (ref 31.5–35.7)
MCV RBC AUTO: 91.2 FL (ref 79–97)
PLATELET # BLD AUTO: 337 10*3/MM3 (ref 140–450)
PMV BLD AUTO: 10.1 FL (ref 6–12)
POTASSIUM SERPL-SCNC: 4.8 MMOL/L (ref 3.5–5.2)
PROT SERPL-MCNC: 7.3 G/DL (ref 6–8.5)
RBC # BLD AUTO: 4.11 10*6/MM3 (ref 3.77–5.28)
SODIUM SERPL-SCNC: 144 MMOL/L (ref 136–145)
TRIGL SERPL-MCNC: 88 MG/DL (ref 0–150)
TSH SERPL DL<=0.05 MIU/L-ACNC: 1.35 UIU/ML (ref 0.27–4.2)
VLDLC SERPL-MCNC: 16 MG/DL (ref 5–40)
WBC NRBC COR # BLD: 5.26 10*3/MM3 (ref 3.4–10.8)

## 2023-05-05 PROCEDURE — 80061 LIPID PANEL: CPT

## 2023-05-05 PROCEDURE — 80050 GENERAL HEALTH PANEL: CPT

## 2023-05-05 PROCEDURE — 82306 VITAMIN D 25 HYDROXY: CPT

## 2023-05-05 PROCEDURE — 36415 COLL VENOUS BLD VENIPUNCTURE: CPT

## 2023-08-25 ENCOUNTER — OFFICE VISIT (OUTPATIENT)
Age: 63
End: 2023-08-25
Payer: COMMERCIAL

## 2023-08-25 VITALS
HEART RATE: 73 BPM | OXYGEN SATURATION: 99 % | TEMPERATURE: 98.4 F | WEIGHT: 184 LBS | SYSTOLIC BLOOD PRESSURE: 142 MMHG | DIASTOLIC BLOOD PRESSURE: 80 MMHG | HEIGHT: 63 IN | BODY MASS INDEX: 32.6 KG/M2

## 2023-08-25 DIAGNOSIS — I10 ESSENTIAL HYPERTENSION: Primary | ICD-10-CM

## 2023-08-25 DIAGNOSIS — M19.042 OSTEOARTHRITIS OF LEFT HAND, UNSPECIFIED OSTEOARTHRITIS TYPE: ICD-10-CM

## 2023-08-25 DIAGNOSIS — Z80.3 FAMILY HISTORY OF BREAST CANCER: ICD-10-CM

## 2023-08-25 DIAGNOSIS — K21.9 GASTROESOPHAGEAL REFLUX DISEASE, UNSPECIFIED WHETHER ESOPHAGITIS PRESENT: ICD-10-CM

## 2023-08-25 DIAGNOSIS — E66.09 CLASS 1 OBESITY DUE TO EXCESS CALORIES WITHOUT SERIOUS COMORBIDITY WITH BODY MASS INDEX (BMI) OF 32.0 TO 32.9 IN ADULT: ICD-10-CM

## 2023-08-25 PROCEDURE — 99214 OFFICE O/P EST MOD 30 MIN: CPT | Performed by: NURSE PRACTITIONER

## 2023-08-25 RX ORDER — OMEPRAZOLE 40 MG/1
40 CAPSULE, DELAYED RELEASE ORAL DAILY
Qty: 90 CAPSULE | Refills: 1 | Status: SHIPPED | OUTPATIENT
Start: 2023-08-25

## 2023-08-25 RX ORDER — AMLODIPINE BESYLATE 5 MG/1
5 TABLET ORAL DAILY
Qty: 90 TABLET | Refills: 0 | Status: SHIPPED | OUTPATIENT
Start: 2023-08-25

## 2023-08-25 RX ORDER — KETOCONAZOLE 20 MG/G
CREAM TOPICAL
COMMUNITY
Start: 2023-07-12

## 2023-08-25 NOTE — PATIENT INSTRUCTIONS
Obesity, Adult  Obesity is the condition of having too much total body fat. Being overweight or obese means that your weight is greater than what is considered healthy for your body size. Obesity is determined by a measurement called BMI (body mass index). BMI is an estimate of body fat and is calculated from height and weight. For adults, a BMI of 30 or higher is considered obese.  Obesity can lead to other health concerns and major illnesses, including:  Stroke.  Coronary artery disease (CAD).  Type 2 diabetes.  Some types of cancer, including cancers of the colon, breast, uterus, and gallbladder.  High blood pressure (hypertension).  High cholesterol.  Gallbladder stones.  Obesity can also contribute to:  Osteoarthritis.  Sleep apnea.  Infertility problems.  What are the causes?  Common causes of this condition include:  Eating daily meals that are high in calories, sugar, and fat.  Drinking high amounts of sugar-sweetened beverages, such as soft drinks.  Being born with genes that may make you more likely to become obese.  Having a medical condition that causes obesity, including:  Hypothyroidism.  Polycystic ovarian syndrome (PCOS).  Binge-eating disorder.  Cushing syndrome.  Taking certain medicines, such as steroids, antidepressants, and seizure medicines.  Not being physically active (sedentary lifestyle).  Not getting enough sleep.  What increases the risk?  The following factors may make you more likely to develop this condition:  Having a family history of obesity.  Living in an area with limited access to:  [place], recreation centers, or sidewalks.  Healthy food choices, such as grocery stores and [employer] markets.  What are the signs or symptoms?  The main sign of this condition is having too much body fat.  How is this diagnosed?  This condition is diagnosed based on:  Your BMI. If you are an adult with a BMI of 30 or higher, you are considered obese.  Your waist circumference. This measures the  distance around your waistline.  Your skinfold thickness. Your health care provider may gently pinch a fold of your skin and measure it.  You may have other tests to check for underlying conditions.  How is this treated?  Treatment for this condition often includes changing your lifestyle. Treatment may include some or all of the following:  Dietary changes. This may include developing a healthy meal plan.  Regular physical activity. This may include activity that causes your heart to beat faster (aerobic exercise) and strength training. Work with your health care provider to design an exercise program that works for you.  Medicine to help you lose weight if you are unable to lose one pound a week after six weeks of healthy eating and more physical activity.  Treating conditions that cause the obesity (underlying conditions).  Surgery. Surgical options may include gastric banding and gastric bypass. Surgery may be done if:  Other treatments have not helped to improve your condition.  You have a BMI of 40 or higher.  You have life-threatening health problems related to obesity.  Follow these instructions at home:  Eating and drinking    Follow recommendations from your health care provider about what you eat and drink. Your health care provider may advise you to:  Limit fast food, sweets, and processed snack foods.  Choose low-fat options, such as low-fat milk instead of whole milk.  Eat five or more servings of fruits or vegetables every day.  Choose healthy foods when you eat out.  Keep low-fat snacks available.  Limit sugary drinks, such as soda, fruit juice, sweetened iced tea, and flavored milk.  Drink enough water to keep your urine pale yellow.  Do not follow a fad diet. Fad diets can be unhealthy and even dangerous.  Other healthful choices include:  Eat at home more often. This gives you more control over what you eat.  Learn to read food labels. This will help you understand how much food is considered one  serving.  Learn what a healthy serving size is.  Physical activity  Exercise regularly, as told by your health care provider.  Most adults should get up to 150 minutes of moderate-intensity exercise every week.  Ask your health care provider what types of exercise are safe for you and how often you should exercise.  Warm up and stretch before being active.  Cool down and stretch after being active.  Rest between periods of activity.  Lifestyle  Work with your health care provider and a dietitian to set a weight-loss goal that is healthy and reasonable for you.  Limit your screen time.  Find ways to reward yourself that do not involve food.  Do not drink alcohol if:  Your health care provider tells you not to drink.  You are pregnant, may be pregnant, or are planning to become pregnant.  If you drink alcohol:  Limit how much you have to:  0-1 drink a day for women.  0-2 drinks a day for men.  Know how much alcohol is in your drink. In the [place] one drink equals one 12 oz bottle of beer (355 mL), one 5 oz glass of wine (148 mL), or one 1« oz glass of hard liquor (44 mL).  General instructions  Keep a weight-loss journal to keep track of the food you eat and how much exercise you get.  Take over-the-counter and prescription medicines only as told by your health care provider.  Take vitamins and supplements only as told by your health care provider.  Consider joining a support group. Your health care provider may be able to recommend a support group.  Pay attention to your mental health as obesity can lead to depression or self esteem issues.  Keep all follow-up visits. This is important.  Contact a health care provider if:  You are unable to meet your weight-loss goal after six weeks of dietary and lifestyle changes.  You have trouble breathing.  Summary  Obesity is the condition of having too much total body fat.  Being overweight or obese means that your weight is greater than what is considered healthy for your body  size.  Work with your health care provider and a dietitian to set a weight-loss goal that is healthy and reasonable for you.  Exercise regularly, as told by your health care provider. Ask your health care provider what types of exercise are safe for you and how often you should exercise.  This information is not intended to replace advice given to you by your health care provider. Make sure you discuss any questions you have with your health care provider.  Document Revised: 07/26/2022 Document Reviewed: 07/26/2022  [employer] Patient Education c 2023 [employer]  Managing Your Hypertension  Hypertension, also called high blood pressure, is when the force of the blood pressing against the walls of the arteries is too strong. Arteries are blood vessels that carry blood from your heart throughout your body. Hypertension forces the heart to work harder to pump blood and may cause the arteries to become narrow or stiff.  Understanding blood pressure readings  A blood pressure reading includes a higher number over a lower number:  The first, or top, number is called the systolic pressure. It is a measure of the pressure in your arteries as your heart beats.  The second, or bottom number, is called the diastolic pressure. It is a measure of the pressure in your arteries as the heart relaxes.  For most people, a normal blood pressure is below 120/80. Your personal target blood pressure may vary depending on your medical conditions, your age, and other factors.  Blood pressure is classified into four stages. Based on your blood pressure reading, your health care provider may use the following stages to determine what type of treatment you need, if any. Systolic pressure and diastolic pressure are measured in a unit called millimeters of mercury (mmHg).  Normal  Systolic pressure: below 120.  Diastolic pressure: below 80.  Elevated  Systolic pressure: 120-129.  Diastolic pressure: below 80.  Hypertension stage 1  Systolic  pressure: 130-139.  Diastolic pressure: 80-89.  Hypertension stage 2  Systolic pressure: 140 or above.  Diastolic pressure: 90 or above.  How can this condition affect me?  Managing your hypertension is very important. Over time, hypertension can damage the arteries and decrease blood flow to parts of the body, including the brain, heart, and kidneys. Having untreated or uncontrolled hypertension can lead to:  A heart attack.  A stroke.  A weakened blood vessel (aneurysm).  Heart failure.  Kidney damage.  Eye damage.  Memory and concentration problems.  Vascular dementia.  What actions can I take to manage this condition?  Hypertension can be managed by making lifestyle changes and possibly by taking medicines. Your health care provider will help you make a plan to bring your blood pressure within a normal range. You may be referred for counseling on a healthy diet and physical activity.  Nutrition    Eat a diet that is high in fiber and potassium, and low in salt (sodium), added sugar, and fat. An example eating plan is called the DASH diet. DASH stands for Dietary Approaches to Stop Hypertension. To eat this way:  Eat plenty of fresh fruits and vegetables. Try to fill one-half of your plate at each meal with fruits and vegetables.  Eat whole grains, such as whole-wheat pasta, brown rice, or whole-grain bread. Fill about one-fourth of your plate with whole grains.  Eat low-fat dairy products.  Avoid fatty cuts of meat, processed or cured meats, and poultry with skin. Fill about one-fourth of your plate with lean proteins such as fish, chicken without skin, beans, eggs, and tofu.  Avoid pre-made and processed foods. These tend to be higher in sodium, added sugar, and fat.  Reduce your daily sodium intake. Many people with hypertension should eat less than 1,500 mg of sodium a day.  Lifestyle    Work with your health care provider to maintain a healthy body weight or to lose weight. Ask what an ideal weight is for  you.  Get at least 30 minutes of exercise that causes your heart to beat faster (aerobic exercise) most days of the week. Activities may include walking, swimming, or biking.  Include exercise to strengthen your muscles (resistance exercise), such as weight lifting, as part of your weekly exercise routine. Try to do these types of exercises for 30 minutes at least 3 days a week.  Do not use any products that contain nicotine or tobacco. These products include cigarettes, chewing tobacco, and vaping devices, such as e-cigarettes. If you need help quitting, ask your health care provider.  Control any long-term (chronic) conditions you have, such as high cholesterol or diabetes.  Identify your sources of stress and find ways to manage stress. This may include meditation, deep breathing, or making time for fun activities.  Alcohol use  Do not drink alcohol if:  Your health care provider tells you not to drink.  You are pregnant, may be pregnant, or are planning to become pregnant.  If you drink alcohol:  Limit how much you have to:  0-1 drink a day for women.  0-2 drinks a day for men.  Know how much alcohol is in your drink. In the [place] one drink equals one 12 oz bottle of beer (355 mL), one 5 oz glass of wine (148 mL), or one 1« oz glass of hard liquor (44 mL).  Medicines  Your health care provider may prescribe medicine if lifestyle changes are not enough to get your blood pressure under control and if:  Your systolic blood pressure is 130 or higher.  Your diastolic blood pressure is 80 or higher.  Take medicines only as told by your health care provider. Follow the directions carefully. Blood pressure medicines must be taken as told by your health care provider. The medicine does not work as well when you skip doses. Skipping doses also puts you at risk for problems.  Monitoring  Before you monitor your blood pressure:  Do not smoke, drink caffeinated beverages, or exercise within 30 minutes before taking a  measurement.  Use the bathroom and empty your bladder (urinate).  Sit quietly for at least 5 minutes before taking measurements.  Monitor your blood pressure at home as told by your health care provider. To do this:  Sit with your back straight and supported.  Place your feet flat on the floor. Do not cross your legs.  Support your arm on a flat surface, such as a table. Make sure your upper arm is at heart level.  Each time you measure, take two or three readings one minute apart and record the results.  You may also need to have your blood pressure checked regularly by your health care provider.  General information  Talk with your health care provider about your diet, exercise habits, and other lifestyle factors that may be contributing to hypertension.  Review all the medicines you take with your health care provider because there may be side effects or interactions.  Keep all follow-up visits. Your health care provider can help you create and adjust your plan for managing your high blood pressure.  Where to find more information  National Heart, Lung, and Blood [place]: www.nhlbi.nih.gov  American Heart Association: www.heart.org  Contact a health care provider if:  You think you are having a reaction to medicines you have taken.  You have repeated (recurrent) headaches.  You feel dizzy.  You have swelling in your ankles.  You have trouble with your vision.  Get help right away if:  You develop a severe headache or confusion.  You have unusual weakness or numbness, or you feel faint.  You have severe pain in your chest or abdomen.  You vomit repeatedly.  You have trouble breathing.  These symptoms may be an emergency. Get help right away. Call 911.  Do not wait to see if the symptoms will go away.  Do not drive yourself to the hospital.  Summary  Hypertension is when the force of blood pumping through your arteries is too strong. If this condition is not controlled, it may put you at risk for serious  "complications.  Your personal target blood pressure may vary depending on your medical conditions, your age, and other factors. For most people, a normal blood pressure is less than 120/80.  Hypertension is managed by lifestyle changes, medicines, or both.  Lifestyle changes to help manage hypertension include losing weight, eating a healthy, low-sodium diet, exercising more, stopping smoking, and limiting alcohol.  This information is not intended to replace advice given to you by your health care provider. Make sure you discuss any questions you have with your health care provider.  Document Revised: 09/01/2022 Document Reviewed: 09/01/2022  [employer] Patient Education c 2023 [employer]  DASH Eating Plan  DASH stands for Dietary Approaches to Stop Hypertension. The DASH eating plan is a healthy eating plan that has been shown to:  Reduce high blood pressure (hypertension).  Reduce your risk for type 2 diabetes, heart disease, and stroke.  Help with weight loss.  What are tips for following this plan?  Reading food labels  Check food labels for the amount of salt (sodium) per serving. Choose foods with less than 5 percent of the Daily Value of sodium. Generally, foods with less than 300 milligrams (mg) of sodium per serving fit into this eating plan.  To find whole grains, look for the word \"whole\" as the first word in the ingredient list.  Shopping  Buy products labeled as \"low-sodium\" or \"no salt added.\"  Buy fresh foods. Avoid canned foods and pre-made or frozen meals.  Cooking  Avoid adding salt when cooking. Use salt-free seasonings or herbs instead of table salt or sea salt. Check with your health care provider or pharmacist before using salt substitutes.  Do not coley foods. Cook foods using healthy methods such as baking, boiling, grilling, roasting, and broiling instead.  Cook with heart-healthy oils, such as olive, canola, avocado, soybean, or sunflower oil.  Meal planning    Eat a balanced diet that " includes:  4 or more servings of fruits and 4 or more servings of vegetables each day. Try to fill one-half of your plate with fruits and vegetables.  6-8 servings of whole grains each day.  Less than 6 oz (170 g) of lean meat, poultry, or fish each day. A 3-oz (85-g) serving of meat is about the same size as a deck of cards. One egg equals 1 oz (28 g).  2-3 servings of low-fat dairy each day. One serving is 1 cup (237 mL).  1 serving of nuts, seeds, or beans 5 times each week.  2-3 servings of heart-healthy fats. Healthy fats called omega-3 fatty acids are found in foods such as walnuts, flaxseeds, fortified milks, and eggs. These fats are also found in cold-water fish, such as sardines, salmon, and mackerel.  Limit how much you eat of:  Canned or prepackaged foods.  Food that is high in trans fat, such as some fried foods.  Food that is high in saturated fat, such as fatty meat.  Desserts and other sweets, sugary drinks, and other foods with added sugar.  Full-fat dairy products.  Do not salt foods before eating.  Do not eat more than 4 egg yolks a week.  Try to eat at least 2 vegetarian meals a week.  Eat more home-cooked food and less restaurant, buffet, and fast food.  Lifestyle  When eating at a restaurant, ask that your food be prepared with less salt or no salt, if possible.  If you drink alcohol:  Limit how much you use to:  0-1 drink a day for women who are not pregnant.  0-2 drinks a day for men.  Be aware of how much alcohol is in your drink. In the [place] one drink equals one 12 oz bottle of beer (355 mL), one 5 oz glass of wine (148 mL), or one 1« oz glass of hard liquor (44 mL).  General information  Avoid eating more than 2,300 mg of salt a day. If you have hypertension, you may need to reduce your sodium intake to 1,500 mg a day.  Work with your health care provider to maintain a healthy body weight or to lose weight. Ask what an ideal weight is for you.  Get at least 30 minutes of exercise that  causes your heart to beat faster (aerobic exercise) most days of the week. Activities may include walking, swimming, or biking.  Work with your health care provider or dietitian to adjust your eating plan to your individual calorie needs.  What foods should I eat?  Fruits  All fresh, dried, or frozen fruit. Canned fruit in natural juice (without added sugar).  Vegetables  Fresh or frozen vegetables (raw, steamed, roasted, or grilled). Low-sodium or reduced-sodium tomato and vegetable juice. Low-sodium or reduced-sodium tomato sauce and tomato paste. Low-sodium or reduced-sodium canned vegetables.  Grains  Whole-grain or whole-wheat bread. Whole-grain or whole-wheat pasta. Brown rice. Oatmeal. Quinoa. Bulgur. Whole-grain and low-sodium cereals. Aide bread. Low-fat, low-sodium crackers. Whole-wheat flour tortillas.  Meats and other proteins  Skinless chicken or turkey. Ground chicken or turkey. Pork with fat trimmed off. Fish and seafood. Egg whites. Dried beans, peas, or lentils. Unsalted nuts, nut butters, and seeds. Unsalted canned beans. Lean cuts of beef with fat trimmed off. Low-sodium, lean precooked or cured meat, such as sausages or meat loaves.  Dairy  Low-fat (1%) or fat-free (skim) milk. Reduced-fat, low-fat, or fat-free cheeses. Nonfat, low-sodium ricotta or cottage cheese. Low-fat or nonfat yogurt. Low-fat, low-sodium cheese.  Fats and oils  Soft margarine without trans fats. Vegetable oil. Reduced-fat, low-fat, or light mayonnaise and salad dressings (reduced-sodium). Canola, safflower, olive, avocado, soybean, and sunflower oils. Avocado.  Seasonings and condiments  Herbs. Spices. Seasoning mixes without salt.  Other foods  Unsalted popcorn and pretzels. Fat-free sweets.  The items listed above may not be a complete list of foods and beverages you can eat. Contact a dietitian for more information.  What foods should I avoid?  Fruits  Canned fruit in a light or heavy syrup. Fried fruit. Fruit in cream  or butter sauce.  Vegetables  Creamed or fried vegetables. Vegetables in a cheese sauce. Regular canned vegetables (not low-sodium or reduced-sodium). Regular canned tomato sauce and paste (not low-sodium or reduced-sodium). Regular tomato and vegetable juice (not low-sodium or reduced-sodium). Pickles. Olives.  Grains  Baked goods made with fat, such as croissants, muffins, or some breads. Dry pasta or rice meal packs.  Meats and other proteins  Fatty cuts of meat. Ribs. Fried meat. Guo. Bologna, salami, and other precooked or cured meats, such as sausages or meat loaves. Fat from the back of a pig (fatback). Bratwurst. Salted nuts and seeds. Canned beans with added salt. Canned or smoked fish. Whole eggs or egg yolks. Chicken or turkey with skin.  Dairy  Whole or 2% milk, cream, and half-and-half. Whole or full-fat cream cheese. Whole-fat or sweetened yogurt. Full-fat cheese. Nondairy creamers. Whipped toppings. Processed cheese and cheese spreads.  Fats and oils  Butter. Stick margarine. Lard. Shortening. Ghee. Guo fat. Tropical oils, such as coconut, palm kernel, or palm oil.  Seasonings and condiments  Onion salt, garlic salt, seasoned salt, table salt, and sea salt. [facility] sauce. Tartar sauce. Barbecue sauce. Teriyaki sauce. Soy sauce, including reduced-sodium. Steak sauce. Canned and packaged gravies. Fish sauce. Oyster sauce. Cocktail sauce. Store-bought horseradish. Ketchup. Mustard. Meat flavorings and tenderizers. Bouillon cubes. Hot sauces. Pre-made or packaged marinades. Pre-made or packaged taco seasonings. Relishes. Regular salad dressings.  Other foods  Salted popcorn and pretzels.  The items listed above may not be a complete list of foods and beverages you should avoid. Contact a dietitian for more information.  Where to find more information  National Heart, Lung, and Blood [place]: www.nhlbi.nih.gov  American Heart Association: www.heart.org  Academy of Nutrition and Dietetics:  www.eatright.org  National Kidney Foundation: www.kidney.org  Summary  The DASH eating plan is a healthy eating plan that has been shown to reduce high blood pressure (hypertension). It may also reduce your risk for type 2 diabetes, heart disease, and stroke.  When on the DASH eating plan, aim to eat more fresh fruits and vegetables, whole grains, lean proteins, low-fat dairy, and heart-healthy fats.  With the DASH eating plan, you should limit salt (sodium) intake to 2,300 mg a day. If you have hypertension, you may need to reduce your sodium intake to 1,500 mg a day.  Work with your health care provider or dietitian to adjust your eating plan to your individual calorie needs.  This information is not intended to replace advice given to you by your health care provider. Make sure you discuss any questions you have with your health care provider.  Document Revised: 11/20/2020 Document Reviewed: 11/20/2020  [employer] Patient Education c 2023 [employer]

## 2023-08-25 NOTE — PROGRESS NOTES
Chief Complaint   Patient presents with    Hand Pain     Left pinky and pointer finger pain     Abdominal Pain    Hypertension       Subjective   Carlota Santana is a 62 y.o. female    History of Present Illness  2023- HPI patient today for routine yearly physical exam and for follow-up on high blood pressure.  She is currently on losartan 100 mg daily.  She also does have complaints of some pain in her left little finger.  She notices it when forced when she works.  This is been going on since October to November of last year.  She did have an x-ray of her left hand on  of last year which showed some minimal arthritic type changes at that time.  She has not taken anything for this hand pain.  She reports she did go to therapy but it did not help.     2023- Today to follow-up on high blood pressure, left hand pain, has complaints of abdominal pain.  She has been having pain in his hands since around 2022.  The x-ray she had as above showed some minimal arthritic pains.  These x-rays were done done at the [facility].  She also complains of GERD symptoms, she does take meloxicam 7.5 mg daily. She does not check BP at home, weight is up 3 lbs.    No Known Allergies  Past Medical History:   Diagnosis Date    Hypertension       Past Surgical History:   Procedure Laterality Date     SECTION  1989    GASTRIC BYPASS       Social History     Socioeconomic History    Marital status:    Tobacco Use    Smoking status: Never     Passive exposure: Never    Smokeless tobacco: Never   Vaping Use    Vaping Use: Never used   Substance and Sexual Activity    Alcohol use: Yes     Alcohol/week: 3.0 standard drinks     Types: 3 Glasses of wine per week     Comment: 3 weekly    Drug use: Never    Sexual activity: Not Currently     Partners: Male     Birth control/protection: Surgical, Post-menopausal, Same-sex partner        Current Outpatient Medications:     Ascorbic Acid  Buffered (BUFFERED VITAMIN C) 1000 MG capsule, Take  by mouth., Disp: , Rfl:     Cholecalciferol (VITAMIN D3) 50 MCG (2000 UT) capsule, Take 1 capsule by mouth Daily., Disp: , Rfl:     fluticasone (Flonase) 50 MCG/ACT nasal spray, 2 sprays into the nostril(s) as directed by provider Daily., Disp: 18.2 mL, Rfl: 11    ketoconazole (NIZORAL) 2 % cream, , Disp: , Rfl:     losartan (COZAAR) 100 MG tablet, Take 1 tablet by mouth Daily., Disp: 90 tablet, Rfl: 3    Magnesium 250 MG tablet, Take  by mouth., Disp: , Rfl:     meloxicam (MOBIC) 7.5 MG tablet, TAKE ONE TABLET BY MOUTH DAILY, Disp: 30 tablet, Rfl: 2    Multiple Vitamins-Minerals (MULTIVITAMIN ADULT EXTRA C PO), Take  by mouth., Disp: , Rfl:     multivitamin (THERAGRAN) tablet tablet, Take  by mouth., Disp: , Rfl:     vitamin E 400 UNIT capsule, Take 1 capsule by mouth Daily., Disp: , Rfl:     amLODIPine (Norvasc) 5 MG tablet, Take 1 tablet by mouth Daily., Disp: 90 tablet, Rfl: 0    omeprazole (priLOSEC) 40 MG capsule, Take 1 capsule by mouth Daily., Disp: 90 capsule, Rfl: 1     Review of Systems   Constitutional:  Negative for chills, fatigue, fever and unexpected weight change.   Respiratory:  Negative for cough, chest tightness, shortness of breath and wheezing.    Cardiovascular:  Negative for chest pain, palpitations and leg swelling.   Gastrointestinal:  Negative for constipation, diarrhea, nausea and vomiting.   Genitourinary:  Negative for difficulty urinating and dysuria.   Musculoskeletal:  Positive for arthralgias (fingers).   Skin:  Negative for color change and rash.   Neurological:  Negative for dizziness, syncope, weakness and headaches.   Psychiatric/Behavioral:  Negative for sleep disturbance.      Objective     Vitals:    08/25/23 1507   BP: 142/80   Pulse: 73   Temp: 98.4 øF (36.9 øC)   SpO2: 99%         08/25/23  1507   Weight: 83.5 kg (184 lb)     Body mass index is 32.32 kg/mý.  Results for orders placed or performed in visit on 05/05/23    Comprehensive Metabolic Panel    Specimen: Blood   Result Value Ref Range    Glucose 90 65 - 99 mg/dL    BUN 14 8 - 23 mg/dL    Creatinine 0.66 0.57 - 1.00 mg/dL    Sodium 144 136 - 145 mmol/L    Potassium 4.8 3.5 - 5.2 mmol/L    Chloride 108 (H) 98 - 107 mmol/L    CO2 26.4 22.0 - 29.0 mmol/L    Calcium 10.0 8.6 - 10.5 mg/dL    Total Protein 7.3 6.0 - 8.5 g/dL    Albumin 4.2 3.5 - 5.2 g/dL    ALT (SGPT) 19 1 - 33 U/L    AST (SGOT) 31 1 - 32 U/L    Alkaline Phosphatase 92 39 - 117 U/L    Total Bilirubin 0.4 0.0 - 1.2 mg/dL    Globulin 3.1 gm/dL    A/G Ratio 1.4 g/dL    BUN/Creatinine Ratio 21.2 7.0 - 25.0    Anion Gap 9.6 5.0 - 15.0 mmol/L    eGFR 99.9 >60.0 mL/min/1.73   Lipid Panel    Specimen: Blood   Result Value Ref Range    Total Cholesterol 181 0 - 200 mg/dL    Triglycerides 88 0 - 150 mg/dL    HDL Cholesterol 62 (H) 40 - 60 mg/dL    LDL Cholesterol  103 (H) 0 - 100 mg/dL    VLDL Cholesterol 16 5 - 40 mg/dL    LDL/HDL Ratio 1.64    Vitamin D,25-Hydroxy    Specimen: Blood   Result Value Ref Range    25 Hydroxy, Vitamin D 54.2 30.0 - 100.0 ng/ml   TSH Rfx On Abnormal To Free T4    Specimen: Blood   Result Value Ref Range    TSH 1.350 0.270 - 4.200 uIU/mL   CBC (No Diff)    Specimen: Blood   Result Value Ref Range    WBC 5.26 3.40 - 10.80 10*3/mm3    RBC 4.11 3.77 - 5.28 10*6/mm3    Hemoglobin 12.6 12.0 - 15.9 g/dL    Hematocrit 37.5 34.0 - 46.6 %    MCV 91.2 79.0 - 97.0 fL    MCH 30.7 26.6 - 33.0 pg    MCHC 33.6 31.5 - 35.7 g/dL    RDW 13.9 12.3 - 15.4 %    RDW-SD 46.2 37.0 - 54.0 fl    MPV 10.1 6.0 - 12.0 fL    Platelets 337 140 - 450 10*3/mm3       Physical Exam  Vitals reviewed.   Constitutional:       Appearance: She is well-developed.   HENT:      Head: Normocephalic and atraumatic.   Cardiovascular:      Rate and Rhythm: Normal rate and regular rhythm.      Heart sounds: Normal heart sounds.   Pulmonary:      Effort: Pulmonary effort is normal.      Breath sounds: Normal breath sounds.   Skin:      General: Skin is warm and dry.   Neurological:      Mental Status: She is alert and oriented to person, place, and time.   Psychiatric:         Behavior: Behavior normal.       Assessment & Plan   Problems Addressed this Visit          Cardiac and Vasculature    Essential hypertension - Primary    Relevant Medications    amLODIPine (Norvasc) 5 MG tablet       Endocrine and Metabolic    Class 1 obesity due to excess calories without serious comorbidity with body mass index (BMI) of 32.0 to 32.9 in adult     Other Visit Diagnoses       Family history of breast cancer        Gastroesophageal reflux disease, unspecified whether esophagitis present        Relevant Medications    omeprazole (priLOSEC) 40 MG capsule    Osteoarthritis of left hand, unspecified osteoarthritis type              Diagnoses         Codes Comments    Essential hypertension    -  Primary ICD-10-CM: I10  ICD-9-CM: 401.9     Family history of breast cancer     ICD-10-CM: Z80.3  ICD-9-CM: V16.3     Gastroesophageal reflux disease, unspecified whether esophagitis present     ICD-10-CM: K21.9  ICD-9-CM: 530.81     Osteoarthritis of left hand, unspecified osteoarthritis type     ICD-10-CM: M19.042  ICD-9-CM: 715.94     Class 1 obesity due to excess calories without serious comorbidity with body mass index (BMI) of 32.0 to 32.9 in adult     ICD-10-CM: E66.09, Z68.32  ICD-9-CM: 278.00, V85.32         To try to get better blood pressure control we will start on amlodipine 5 mg daily with her current medication. Patient instructed to check blood pressure daily, record, and bring to next visit. If the readings run 140/90 or greater, the patient is to call my office or return sooner for evaluation. Pt advised to eat a heart healthy diet and get regular aerobic exercise.    Discussed need for weight management.  I recommend they use a weight loss mayra on their phone, eat no more than [id number] pilar/day, and exercise 30 to 60 minutes 3 times a week.  Discussed  weight loss with diet, recommend Weight Watchers or Mediterranean Diet, but stated that whatever method works for this patient is best. The patient agrees with all recommendations at this time. I have discussed a weight loss plan to be determined by this patient.     For GERD symptoms were going to start her on omeprazole 40 mg daily.  I have also recommended she take the meloxicam with food. Patient was encouraged to keep me informed of any acute changes, lack of improvement, or any new concerning symptoms.  If patient becomes concerned, or has any worsening symptoms, they are to report either here, urgent treatment, or emergency room. Plan of care discussed with pt. They verbalized understanding and agreement.     Time spent on visit, including counseling, education, reviewing the chart, and any recent test results, was    15    Minutes    Return visit in April for physical    Counseling provided on weight management, GERD, and hypertension.    Rehan Ramirez, APRN   8/25/2023   15:30 EDT     Please note that portions of this document were completed with a voice recognition program.     At [facility], we believe that sharing information builds trust and better relationships. You are receiving this note because you are receiving care at [facility] or have recently visited. It is possible you will see health information before a provider has talked with you about it. This kind of information can be easy to misunderstand. To help you fully understand what it means for your health, we urge you to discuss this note with your provider.       Answers submitted by the patient for this visit:  Other (Submitted on 8/23/2023)  Please describe your symptoms.: Control hand pain  Have you had these symptoms before?: Yes  How long have you been having these symptoms?: Greater than 2 weeks  Please list any medications you are currently taking for this condition.: Meloxicam 7.5mg  Please describe any probable cause for  these symptoms. : Fingers pain  Primary Reason for Visit (Submitted on 8/23/2023)  What is the primary reason for your visit?: Other

## 2023-10-15 DIAGNOSIS — M19.042 OSTEOARTHRITIS OF LEFT HAND, UNSPECIFIED OSTEOARTHRITIS TYPE: ICD-10-CM

## 2023-10-16 RX ORDER — MELOXICAM 7.5 MG/1
7.5 TABLET ORAL DAILY
Qty: 90 TABLET | Refills: 2 | Status: SHIPPED | OUTPATIENT
Start: 2023-10-16

## 2023-10-16 NOTE — TELEPHONE ENCOUNTER
Rx Refill Note  Requested Prescriptions     Pending Prescriptions Disp Refills    meloxicam (MOBIC) 7.5 MG tablet [Pharmacy Med Name: MELOXICAM 7.5 MG TABLET] 90 tablet      Sig: TAKE 1 TABLET BY MOUTH DAILY      Last office visit with prescribing clinician: 8/25/2023   Last telemedicine visit with prescribing clinician: Visit date not found   Next office visit with prescribing clinician: 4/24/2024                         Would you like a call back once the refill request has been completed: [] Yes [] No    If the office needs to give you a call back, can they leave a voicemail: [] Yes [] No    Estella Guerrero MA  10/16/23, 08:23 EDT

## 2023-11-20 DIAGNOSIS — I10 ESSENTIAL HYPERTENSION: ICD-10-CM

## 2023-11-20 RX ORDER — AMLODIPINE BESYLATE 5 MG/1
5 TABLET ORAL DAILY
Qty: 90 TABLET | Refills: 0 | Status: SHIPPED | OUTPATIENT
Start: 2023-11-20

## 2023-11-20 NOTE — TELEPHONE ENCOUNTER
Rx Refill Note  Requested Prescriptions     Pending Prescriptions Disp Refills    amLODIPine (NORVASC) 5 MG tablet [Pharmacy Med Name: amLODIPine BESYLATE 5 MG TAB] 90 tablet 0     Sig: TAKE 1 TABLET BY MOUTH DAILY      Last office visit with prescribing clinician: 8/25/2023   Last telemedicine visit with prescribing clinician: Visit date not found   Next office visit with prescribing clinician: 4/24/2024                         Would you like a call back once the refill request has been completed: [] Yes [] No    If the office needs to give you a call back, can they leave a voicemail: [] Yes [] No    Kassidy Berman MA  11/20/23, 13:05 EST

## 2024-01-29 ENCOUNTER — TELEPHONE (OUTPATIENT)
Age: 64
End: 2024-01-29
Payer: COMMERCIAL

## 2024-01-29 NOTE — TELEPHONE ENCOUNTER
[facility] called to get orders for additional views. Patient had an abnormal mammo.     They need L diagnostic mammo and L breast US. These orders can be faxed to [phone]

## 2024-01-30 DIAGNOSIS — Z12.31 ENCOUNTER FOR SCREENING MAMMOGRAM FOR MALIGNANT NEOPLASM OF BREAST: Primary | ICD-10-CM

## 2024-01-31 DIAGNOSIS — R92.8 ABNORMAL MAMMOGRAM: Primary | ICD-10-CM

## 2024-01-31 NOTE — TELEPHONE ENCOUNTER
On the orders, can you update the diagnosis to abnormal mammogram? She's already had the screenings.

## 2024-02-13 DIAGNOSIS — K21.9 GASTROESOPHAGEAL REFLUX DISEASE, UNSPECIFIED WHETHER ESOPHAGITIS PRESENT: ICD-10-CM

## 2024-02-14 RX ORDER — OMEPRAZOLE 40 MG/1
40 CAPSULE, DELAYED RELEASE ORAL DAILY
Qty: 90 CAPSULE | Refills: 1 | Status: SHIPPED | OUTPATIENT
Start: 2024-02-14

## 2024-04-24 ENCOUNTER — OFFICE VISIT (OUTPATIENT)
Age: 64
End: 2024-04-24
Payer: COMMERCIAL

## 2024-04-24 VITALS
HEART RATE: 76 BPM | BODY MASS INDEX: 32.48 KG/M2 | WEIGHT: 183.3 LBS | SYSTOLIC BLOOD PRESSURE: 130 MMHG | HEIGHT: 63 IN | DIASTOLIC BLOOD PRESSURE: 78 MMHG | OXYGEN SATURATION: 97 % | TEMPERATURE: 97.5 F

## 2024-04-24 DIAGNOSIS — M19.042 OSTEOARTHRITIS OF LEFT HAND, UNSPECIFIED OSTEOARTHRITIS TYPE: ICD-10-CM

## 2024-04-24 DIAGNOSIS — I10 ESSENTIAL HYPERTENSION: Primary | ICD-10-CM

## 2024-04-24 DIAGNOSIS — J30.1 SEASONAL ALLERGIC RHINITIS DUE TO POLLEN: ICD-10-CM

## 2024-04-24 DIAGNOSIS — R92.30 DENSE BREAST TISSUE: ICD-10-CM

## 2024-04-24 DIAGNOSIS — Z13.0 SCREENING FOR DEFICIENCY ANEMIA: ICD-10-CM

## 2024-04-24 DIAGNOSIS — G89.29 CHRONIC PAIN IN LEFT FOOT: ICD-10-CM

## 2024-04-24 DIAGNOSIS — K21.9 GASTROESOPHAGEAL REFLUX DISEASE, UNSPECIFIED WHETHER ESOPHAGITIS PRESENT: ICD-10-CM

## 2024-04-24 DIAGNOSIS — Z13.29 SCREENING FOR THYROID DISORDER: ICD-10-CM

## 2024-04-24 DIAGNOSIS — M79.645 PAIN IN LEFT FINGER(S): ICD-10-CM

## 2024-04-24 DIAGNOSIS — Z00.00 WELLNESS EXAMINATION: ICD-10-CM

## 2024-04-24 DIAGNOSIS — M79.672 CHRONIC PAIN IN LEFT FOOT: ICD-10-CM

## 2024-04-24 DIAGNOSIS — Z12.11 SCREEN FOR COLON CANCER: ICD-10-CM

## 2024-04-24 DIAGNOSIS — Z13.1 SCREENING FOR DIABETES MELLITUS: ICD-10-CM

## 2024-04-24 DIAGNOSIS — E55.9 VITAMIN D DEFICIENCY: ICD-10-CM

## 2024-04-24 DIAGNOSIS — Z23 NEED FOR PNEUMOCOCCAL VACCINATION: ICD-10-CM

## 2024-04-24 PROCEDURE — 90677 PCV20 VACCINE IM: CPT | Performed by: NURSE PRACTITIONER

## 2024-04-24 PROCEDURE — 90471 IMMUNIZATION ADMIN: CPT | Performed by: NURSE PRACTITIONER

## 2024-04-24 PROCEDURE — 99396 PREV VISIT EST AGE 40-64: CPT | Performed by: NURSE PRACTITIONER

## 2024-04-24 RX ORDER — OMEPRAZOLE 40 MG/1
40 CAPSULE, DELAYED RELEASE ORAL DAILY
Qty: 90 CAPSULE | Refills: 3 | Status: SHIPPED | OUTPATIENT
Start: 2024-04-24

## 2024-04-24 RX ORDER — MELOXICAM 7.5 MG/1
7.5 TABLET ORAL DAILY
Qty: 90 TABLET | Refills: 3 | Status: SHIPPED | OUTPATIENT
Start: 2024-04-24

## 2024-04-24 RX ORDER — FLUTICASONE PROPIONATE 50 MCG
2 SPRAY, SUSPENSION (ML) NASAL DAILY
Qty: 18.2 ML | Refills: 11 | Status: SHIPPED | OUTPATIENT
Start: 2024-04-24

## 2024-04-24 RX ORDER — LOSARTAN POTASSIUM 100 MG/1
100 TABLET ORAL DAILY
Qty: 90 TABLET | Refills: 3 | Status: SHIPPED | OUTPATIENT
Start: 2024-04-24

## 2024-04-24 RX ORDER — AMLODIPINE BESYLATE 5 MG/1
5 TABLET ORAL DAILY
Qty: 90 TABLET | Refills: 3 | Status: SHIPPED | OUTPATIENT
Start: 2024-04-24

## 2024-04-24 NOTE — LETTER
[facility]  Vaccine Consent Form    Patient Name:  Carlota Santana  Patient :  1960     Vaccine(s) Ordered    Pneumococcal Conjugate Vaccine 20-Valent All        Screening Checklist  The following questions should be completed prior to vaccination. If you answer “yes” to any question, it does not necessarily mean you should not be vaccinated. It just means we may need to clarify or ask more questions. If a question is unclear, please ask your healthcare provider to explain it.    Yes No   Any fever or moderate to severe illness today (mild illness and/or antibiotic treatment are not contraindications)?     Do you have a history of a serious reaction to any previous vaccinations, such as anaphylaxis, encephalopathy within 7 days, Guillain-Kranzburg syndrome within 6 weeks, seizure?     Have you received any live vaccine(s) (e.g MMR, ANY) or any other vaccines in the last month (to ensure duplicate doses aren't given)?     Do you have an anaphylactic allergy to latex (DTaP, DTaP-IPV, Hep A, Hep B, MenB, RV, Td, Tdap), baker’s yeast (Hep B, HPV), polysorbates (RSV, nirsevimab, PCV 20, Rotavirrus, Tdap, Shingrix), or gelatin (ANY, MMR)?     Do you have an anaphylactic allergy to neomycin (Rabies, ANY, MMR, IPV, Hep A), polymyxin B (IPV), or streptomycin (IPV)?      Any cancer, leukemia, AIDS, or other immune system disorder? (ANY, MMR, RV)     Do you have a parent, brother, or sister with an immune system problem (if immune competence of vaccine recipient clinically verified, can proceed)? (MMR, ANY)     Any recent steroid treatments for >2 weeks, chemotherapy, or radiation treatment? (ANY, MMR)     Have you received antibody-containing blood transfusions or IVIG in the past 11 months (recommended interval is dependent on product)? (MMR, ANY)     Have you taken antiviral drugs (acyclovir, famciclovir, valacyclovir for ANY) in the last 24 or 48 hours, respectively?      Are you pregnant or planning to become  "pregnant within 1 month? (ANY, MMR, HPV, IPV, MenB, Abrexvy; For Hep B- refer to Engerix-B; For RSV - Abrysvo is indicated for 32-36 weeks of pregnancy from September to January)     For infants, have you ever been told your child has had intussusception or a medical emergency involving obstruction of the intestine (Rotavirus)? If not for an infant, can skip this question.         *Ordering Physicians/APC should be consulted if \"yes\" is checked by the patient or guardian above.  I have received, read, and understand the Vaccine Information Statement (VIS) for each vaccine ordered.  I have considered my or my child's health status as well as the health status of my close contacts.  I have taken the opportunity to discuss my vaccine questions with my or my child's health care provider.   I have requested that the ordered vaccine(s) be given to me or my child.  I understand the benefits and risks of the vaccines.  I understand that I should remain in the clinic for 15 minutes after receiving the vaccine(s).  _________________________________________________________  Signature of Patient or Parent/Legal Guardian ____________________  Date     "

## 2024-04-24 NOTE — PROGRESS NOTES
Patient Care Team:  Rehan Ramirez APRN as PCP - General (Family Medicine)     Chief complaint: Patient is in today for a physical     Patient is a 63 y.o. female who presents for her yearly physical exam.     HPI     Patient in today for annual physical. She has has history of HTN, obesity, and foot pain. She does not check BP at home. Her BP today is 130/78 today in clinic. She takes her Losartan 100 mg daily and takes amlodipine 5 mg as needed for elevated BPs. She does not exercise but does remain active with her current job. Does report her foot pain has returned in the heel of her left foot. She has previously seen ortho for this and received a steroid injection which greatly improved her pain.She also reports arthritic pain in her left pinky. She states this is intermittent and improves with mobic 7.5 mg.     Review of Systems   Constitutional:  Negative for appetite change, chills, fatigue and fever.   HENT:  Negative for congestion, ear pain, hearing loss, rhinorrhea, sinus pressure and sore throat.    Eyes:  Negative for itching and visual disturbance.   Respiratory:  Negative for cough, shortness of breath and wheezing.    Cardiovascular:  Negative for chest pain, palpitations and leg swelling.   Gastrointestinal:  Negative for abdominal pain, blood in stool, constipation, diarrhea, nausea and vomiting.   Endocrine: Negative for cold intolerance, heat intolerance, polydipsia, polyphagia and polyuria.   Genitourinary:  Negative for difficulty urinating, dysuria and hematuria.   Musculoskeletal:  Positive for arthralgias. Negative for back pain, joint swelling, myalgias and neck pain.   Skin:  Negative for rash and wound.   Allergic/Immunologic: Negative for environmental allergies and food allergies.   Neurological:  Negative for dizziness, light-headedness, numbness and headaches.   Hematological:  Negative for adenopathy. Does not bruise/bleed easily.   Psychiatric/Behavioral:  Negative for  dysphoric mood and sleep disturbance. The patient is not nervous/anxious.       History  Past Medical History:   Diagnosis Date    Hypertension       Past Surgical History:   Procedure Laterality Date     SECTION  1989    GASTRIC BYPASS        No Known Allergies   Family History   Problem Relation Age of Onset    Mental illness Mother     Cancer Father      Social History     Socioeconomic History    Marital status:    Tobacco Use    Smoking status: Never     Passive exposure: Never    Smokeless tobacco: Never   Vaping Use    Vaping status: Never Used   Substance and Sexual Activity    Alcohol use: Yes     Alcohol/week: 3.0 standard drinks of alcohol     Types: 3 Glasses of wine per week     Comment: 3 weekly    Drug use: Never    Sexual activity: Not Currently     Partners: Male     Birth control/protection: Post-menopausal, Same-sex partner, Surgical        Current Outpatient Medications:     amLODIPine (NORVASC) 5 MG tablet, Take 1 tablet by mouth Daily., Disp: 90 tablet, Rfl: 3    Ascorbic Acid Buffered (BUFFERED VITAMIN C) 1000 MG capsule, Take  by mouth., Disp: , Rfl:     Cholecalciferol (VITAMIN D3) 50 MCG (2000 UT) capsule, Take 1 capsule by mouth Daily., Disp: , Rfl:     fluticasone (Flonase) 50 MCG/ACT nasal spray, 2 sprays into the nostril(s) as directed by provider Daily., Disp: 18.2 mL, Rfl: 11    losartan (COZAAR) 100 MG tablet, Take 1 tablet by mouth Daily., Disp: 90 tablet, Rfl: 3    Magnesium 250 MG tablet, Take  by mouth., Disp: , Rfl:     meloxicam (MOBIC) 7.5 MG tablet, Take 1 tablet by mouth Daily., Disp: 90 tablet, Rfl: 3    Multiple Vitamins-Minerals (MULTIVITAMIN ADULT EXTRA C PO), Take  by mouth., Disp: , Rfl:     multivitamin (THERAGRAN) tablet tablet, Take  by mouth., Disp: , Rfl:     omeprazole (priLOSEC) 40 MG capsule, Take 1 capsule by mouth Daily., Disp: 90 capsule, Rfl: 3    vitamin E 400 UNIT capsule, Take 1 capsule by mouth Daily., Disp: , Rfl:     Immunizations  "  N/A   Prescribed/Refused   Date     Td/Tdap  (Booster Q 10 yrs)   [x]           Prescribed    []     Refused        []           Flu  (Yearly)   [x]        Prescribed    []     Refused        []           Pneumonia      []        Prescribed    [x]     Refused        []                 Hep B     [x]        Prescribed    []     Refused        []           Shingles  (Age 50 and older)   [x]        Prescribed    []     Refused        []           Immunization History   Administered Date(s) Administered    ABRYSVO (RSV, 60+ or pregnant women 32-36 wks) 11/18/2023    COVID-19 (PFIZER) BIVALENT 12+YRS 10/13/2022    COVID-19 (PFIZER) Purple Cap Monovalent 03/12/2021, 04/02/2021, 11/11/2021    COVID-19 F23 (PFIZER) 12YRS+ (COMIRNATY) 11/18/2023    Covid-19 (Pfizer) Gray Cap Monovalent 04/15/2022    Fluad Quad 65+ 10/16/2021    Flublok 18+yrs 10/16/2021    Fluzone (or Fluarix & Flulaval for VFC) >6mos 09/04/2020    Influenza Injectable Mdck Pf Quad 10/13/2022, 10/30/2023    Influenza, Unspecified 10/13/2022    Pneumococcal Conjugate 20-Valent (PCV20) 04/24/2024    Shingrix 09/10/2020, 12/02/2020    Tdap 09/04/2020     Health Maintenance   Topic Date Due    COLORECTAL CANCER SCREENING  Never done    INFLUENZA VACCINE  08/01/2024    BMI FOLLOWUP  08/25/2024    ANNUAL PHYSICAL  04/24/2025    PAP SMEAR  10/20/2025    MAMMOGRAM  03/01/2026    TDAP/TD VACCINES (2 - Td or Tdap) 09/04/2030    HEPATITIS C SCREENING  Completed    COVID-19 Vaccine  Completed    RSV Vaccine - Adults  Completed    ZOSTER VACCINE  Completed    Pneumococcal Vaccine 0-64  Aged Out        Diabetes  [] Yes  [x] No   N/A      Date     Eye Exam     []             []   Complete     []   Recommended Date:  Where:       Foot Exam     []         []   Complete          Obesity Counseling     []       []   Complete     No results found for: \"HGBA1C\", \"MICROALBUR\"    Additional Testing      Date     Colorectal Screening       []   N/A   []   Complete    [x]   Ordered "     Date:    Where:       Pap      [x]   N/A   []   Complete   []   OB/GYN Date:    Where:       Mammogram        []   N/A   []   Complete   []  OB/GYN   [x]   Ordered Date:    Where:     PSA  (Over age 50)    [x]   N/A   []   Complete   []   Ordered Date:    Where:     US Aorta  (For male smokers, age 65)     [x]   N/A   []   Complete   []   Ordered Date:    Where:     CT for Smoker  (Age 55-75, 30 pk yr)    [x]   N/A   []   Complete   []   Ordered Date:    Where:     Bone Density/DEXA      [x]   N/A   []   Complete   []   Ordered Date:    Follow-up:     Hep. C        [x]   N/A   []   Complete   []   Ordered Date:    Where:     Results for orders placed or performed in visit on 05/05/23   Comprehensive Metabolic Panel    Specimen: Blood   Result Value Ref Range    Glucose 90 65 - 99 mg/dL    BUN 14 8 - 23 mg/dL    Creatinine 0.66 0.57 - 1.00 mg/dL    Sodium 144 136 - 145 mmol/L    Potassium 4.8 3.5 - 5.2 mmol/L    Chloride 108 (H) 98 - 107 mmol/L    CO2 26.4 22.0 - 29.0 mmol/L    Calcium 10.0 8.6 - 10.5 mg/dL    Total Protein 7.3 6.0 - 8.5 g/dL    Albumin 4.2 3.5 - 5.2 g/dL    ALT (SGPT) 19 1 - 33 U/L    AST (SGOT) 31 1 - 32 U/L    Alkaline Phosphatase 92 39 - 117 U/L    Total Bilirubin 0.4 0.0 - 1.2 mg/dL    Globulin 3.1 gm/dL    A/G Ratio 1.4 g/dL    BUN/Creatinine Ratio 21.2 7.0 - 25.0    Anion Gap 9.6 5.0 - 15.0 mmol/L    eGFR 99.9 >60.0 mL/min/1.73   Lipid Panel    Specimen: Blood   Result Value Ref Range    Total Cholesterol 181 0 - 200 mg/dL    Triglycerides 88 0 - 150 mg/dL    HDL Cholesterol 62 (H) 40 - 60 mg/dL    LDL Cholesterol  103 (H) 0 - 100 mg/dL    VLDL Cholesterol 16 5 - 40 mg/dL    LDL/HDL Ratio 1.64    Vitamin D,25-Hydroxy    Specimen: Blood   Result Value Ref Range    25 Hydroxy, Vitamin D 54.2 30.0 - 100.0 ng/ml   TSH Rfx On Abnormal To Free T4    Specimen: Blood   Result Value Ref Range    TSH 1.350 0.270 - 4.200 uIU/mL   CBC (No Diff)    Specimen: Blood   Result Value Ref Range    WBC 5.26  "3.40 - 10.80 10*3/mm3    RBC 4.11 3.77 - 5.28 10*6/mm3    Hemoglobin 12.6 12.0 - 15.9 g/dL    Hematocrit 37.5 34.0 - 46.6 %    MCV 91.2 79.0 - 97.0 fL    MCH 30.7 26.6 - 33.0 pg    MCHC 33.6 31.5 - 35.7 g/dL    RDW 13.9 12.3 - 15.4 %    RDW-SD 46.2 37.0 - 54.0 fl    MPV 10.1 6.0 - 12.0 fL    Platelets 337 140 - 450 10*3/mm3            /78   Pulse 76   Temp 97.5 °F (36.4 °C) (Temporal)   Ht 160.7 cm (63.27\")   Wt 83.1 kg (183 lb 4.8 oz)   SpO2 97%   BMI 32.20 kg/m²       Physical Exam  Vitals reviewed.   Constitutional:       Appearance: She is well-developed.   HENT:      Head: Normocephalic and atraumatic.      Right Ear: External ear normal.      Left Ear: External ear normal.   Eyes:      Pupils: Pupils are equal, round, and reactive to light.   Neck:      Thyroid: No thyromegaly.      Vascular: No JVD.   Cardiovascular:      Rate and Rhythm: Normal rate and regular rhythm.      Heart sounds: Normal heart sounds.   Pulmonary:      Effort: Pulmonary effort is normal. No retractions.      Breath sounds: Normal breath sounds.   Chest:      Chest wall: No mass, lacerations, deformity, swelling, tenderness, crepitus or edema.   Breasts:     Breasts are symmetrical.   Abdominal:      General: Bowel sounds are normal. There is no distension.      Palpations: Abdomen is soft.      Tenderness: There is no abdominal tenderness. There is no guarding.   Genitourinary:     Comments: deferred  Musculoskeletal:         General: Normal range of motion.      Cervical back: Normal range of motion and neck supple.   Lymphadenopathy:      Cervical: No cervical adenopathy.   Skin:     General: Skin is warm and dry.      Findings: No erythema or rash.   Neurological:      Mental Status: She is alert and oriented to person, place, and time.   Psychiatric:         Behavior: Behavior normal.             Counseling provided on diet and nutrition, exercise, weight management, GERD, and hypertension.    Diagnoses and all orders " for this visit:    Essential hypertension  -     Comprehensive Metabolic Panel; Future  -     losartan (COZAAR) 100 MG tablet; Take 1 tablet by mouth Daily.  -     amLODIPine (NORVASC) 5 MG tablet; Take 1 tablet by mouth Daily.    Screening for diabetes mellitus  -     Comprehensive Metabolic Panel; Future    Vitamin D deficiency  -     Vitamin D,25-Hydroxy; Future    Screening for thyroid disorder  -     TSH Rfx On Abnormal To Free T4; Future    Screening for deficiency anemia  -     CBC (No Diff); Future    Wellness examination  -     Ambulatory Referral For Screening Colonoscopy  -     CBC (No Diff); Future  -     Comprehensive Metabolic Panel; Future  -     Lipid Panel; Future  -     TSH Rfx On Abnormal To Free T4; Future    Screen for colon cancer  -     Ambulatory Referral For Screening Colonoscopy    Pain in left finger(s)  -     Ambulatory Referral to Hand Surgery    Chronic pain in left foot  -     Ambulatory Referral to Orthopedic Surgery    Gastroesophageal reflux disease, unspecified whether esophagitis present  -     omeprazole (priLOSEC) 40 MG capsule; Take 1 capsule by mouth Daily.    Osteoarthritis of left hand, unspecified osteoarthritis type  -     meloxicam (MOBIC) 7.5 MG tablet; Take 1 tablet by mouth Daily.    Seasonal allergic rhinitis due to pollen  -     fluticasone (Flonase) 50 MCG/ACT nasal spray; 2 sprays into the nostril(s) as directed by provider Daily.    Dense breast tissue  -     Mammo Diagnostic Digital Tomosynthesis Bilateral With CAD; Future    Need for pneumococcal vaccination  -     Pneumococcal Conjugate Vaccine 20-Valent All     Patient instructed to check blood pressure daily, record, and bring to next visit. If the readings run 140/90 or greater, the patient is to call my office or return sooner for evaluation. Pt advised to eat a heart healthy diet and get regular aerobic exercise.    Will obtain routine labs today and make further recommendations pending results. All lab  results are automatically released to [employer] immediately when they return whether they have been reviewed or not. The patient will be notified about the results, regardless of the findings. If they have not been contacted by the office within 2 weeks after the test has been performed, I want them to contact us to learn about the results.    I have ordered her colonoscopy to be scheduled.    For her arthritic pain in the left finger, I am giving her a referral to hand surgery.     For her left foot pain we will refer her to orthopedics as she has seen them in the past for this.     The preventative exam has been reviewed in detail.  Counseling/Anticipatory Guidance discussion included discussing nutrition needs, physical activity, healthy weight, injury prevention, misuse of tobacco, alcohol and drugs, sexual behavior, dental health, mental health, immunizations, and needed screenings has been discussed. The patient has been assisted with scheduling healthcare procedures for the coming year and given a written document outlining these recommendations. Age-appropriate screening measures have been ordered for the patient today as indicated above.    RV- 1 year       I, Rehan MEDEIROS, personally performed the services described in this documentation, as scribed by WALDEMAR Hawk student, in my presence, and is both accurate and complete.       WALDEMAR Peters   4/24/2024   16:31 EDT          Please note that portions of this document were completed with a voice recognition program.     At [facility], we believe that sharing information builds trust and better relationships. You are receiving this note because you are receiving care at [facility] or have recently visited. It is possible you will see health information before a provider has talked with you about it. This kind of information can be easy to misunderstand. To help you fully understand what it means for your health, we urge you to  discuss this note with your provider.

## 2024-04-27 ENCOUNTER — LAB (OUTPATIENT)
Dept: LAB | Facility: HOSPITAL | Age: 64
End: 2024-04-27
Payer: COMMERCIAL

## 2024-04-27 DIAGNOSIS — Z00.00 WELLNESS EXAMINATION: ICD-10-CM

## 2024-04-27 DIAGNOSIS — Z13.1 SCREENING FOR DIABETES MELLITUS: ICD-10-CM

## 2024-04-27 DIAGNOSIS — E55.9 VITAMIN D DEFICIENCY: ICD-10-CM

## 2024-04-27 DIAGNOSIS — I10 ESSENTIAL HYPERTENSION: ICD-10-CM

## 2024-04-27 DIAGNOSIS — Z13.0 SCREENING FOR DEFICIENCY ANEMIA: ICD-10-CM

## 2024-04-27 DIAGNOSIS — Z13.29 SCREENING FOR THYROID DISORDER: ICD-10-CM

## 2024-04-27 LAB
25(OH)D3 SERPL-MCNC: 56.8 NG/ML (ref 30–100)
ALBUMIN SERPL-MCNC: 4.5 G/DL (ref 3.5–5.2)
ALBUMIN/GLOB SERPL: 1.7 G/DL
ALP SERPL-CCNC: 93 U/L (ref 39–117)
ALT SERPL W P-5'-P-CCNC: 22 U/L (ref 1–33)
ANION GAP SERPL CALCULATED.3IONS-SCNC: 8 MMOL/L (ref 5–15)
AST SERPL-CCNC: 25 U/L (ref 1–32)
BILIRUB SERPL-MCNC: 0.3 MG/DL (ref 0–1.2)
BUN SERPL-MCNC: 15 MG/DL (ref 8–23)
BUN/CREAT SERPL: 21.4 (ref 7–25)
CALCIUM SPEC-SCNC: 9.3 MG/DL (ref 8.6–10.5)
CHLORIDE SERPL-SCNC: 106 MMOL/L (ref 98–107)
CHOLEST SERPL-MCNC: 207 MG/DL (ref 0–200)
CO2 SERPL-SCNC: 27 MMOL/L (ref 22–29)
CREAT SERPL-MCNC: 0.7 MG/DL (ref 0.57–1)
DEPRECATED RDW RBC AUTO: 46.4 FL (ref 37–54)
EGFRCR SERPLBLD CKD-EPI 2021: 97.9 ML/MIN/1.73
ERYTHROCYTE [DISTWIDTH] IN BLOOD BY AUTOMATED COUNT: 13.6 % (ref 12.3–15.4)
GLOBULIN UR ELPH-MCNC: 2.7 GM/DL
GLUCOSE SERPL-MCNC: 92 MG/DL (ref 65–99)
HCT VFR BLD AUTO: 39 % (ref 34–46.6)
HDLC SERPL-MCNC: 74 MG/DL (ref 40–60)
HGB BLD-MCNC: 12.8 G/DL (ref 12–15.9)
LDLC SERPL CALC-MCNC: 117 MG/DL (ref 0–100)
LDLC/HDLC SERPL: 1.55 {RATIO}
MCH RBC QN AUTO: 30.3 PG (ref 26.6–33)
MCHC RBC AUTO-ENTMCNC: 32.8 G/DL (ref 31.5–35.7)
MCV RBC AUTO: 92.4 FL (ref 79–97)
PLATELET # BLD AUTO: 320 10*3/MM3 (ref 140–450)
PMV BLD AUTO: 9.8 FL (ref 6–12)
POTASSIUM SERPL-SCNC: 4.6 MMOL/L (ref 3.5–5.2)
PROT SERPL-MCNC: 7.2 G/DL (ref 6–8.5)
RBC # BLD AUTO: 4.22 10*6/MM3 (ref 3.77–5.28)
SODIUM SERPL-SCNC: 141 MMOL/L (ref 136–145)
TRIGL SERPL-MCNC: 93 MG/DL (ref 0–150)
TSH SERPL DL<=0.05 MIU/L-ACNC: 1.32 UIU/ML (ref 0.27–4.2)
VLDLC SERPL-MCNC: 16 MG/DL (ref 5–40)
WBC NRBC COR # BLD AUTO: 5.17 10*3/MM3 (ref 3.4–10.8)

## 2024-04-27 PROCEDURE — 82306 VITAMIN D 25 HYDROXY: CPT

## 2024-04-27 PROCEDURE — 80061 LIPID PANEL: CPT

## 2024-04-27 PROCEDURE — 36415 COLL VENOUS BLD VENIPUNCTURE: CPT

## 2024-04-27 PROCEDURE — 80050 GENERAL HEALTH PANEL: CPT

## 2024-05-02 ENCOUNTER — OFFICE VISIT (OUTPATIENT)
Age: 64
End: 2024-05-02
Payer: COMMERCIAL

## 2024-05-02 VITALS
SYSTOLIC BLOOD PRESSURE: 142 MMHG | DIASTOLIC BLOOD PRESSURE: 78 MMHG | HEIGHT: 63 IN | BODY MASS INDEX: 32.5 KG/M2 | WEIGHT: 183.4 LBS

## 2024-05-02 DIAGNOSIS — M19.042 ARTHRITIS OF FINGER OF LEFT HAND: Primary | ICD-10-CM

## 2024-05-02 DIAGNOSIS — M79.642 LEFT HAND PAIN: ICD-10-CM

## 2024-05-02 PROCEDURE — 99203 OFFICE O/P NEW LOW 30 MIN: CPT | Performed by: PLASTIC SURGERY

## 2024-05-02 NOTE — PROGRESS NOTES
[facility] Orthopedic     Office Visit       Date: 05/02/2024   Patient Name: Carlota Santana  MRN: [id number]  YOB: 1960    Referring Physician: Rehan Ramirez AP*     Chief Complaint:   Chief Complaint   Patient presents with    Left Hand - Pain       History of Present Illness:   Carlota Santana is a 63 y.o. female right-hand-dominant presents with left small finger pain and stiffness of 2 years duration.  She reports that she has had increasing pain of the left small finger at the PIP with loss of range of motion.  To a lesser degree she reports left index finger pain at the PIP with some associated stiffness.  She denies history of preceding trauma.  She is otherwise healthy.  She works in a warehouse.  She denies smoking.      Subjective   Review of Systems:   Review of Systems   Constitutional: Negative.  Negative for chills, fatigue and fever.   HENT: Negative.  Negative for congestion and dental problem.    Eyes: Negative.  Negative for blurred vision.   Respiratory: Negative.  Negative for shortness of breath.    Cardiovascular: Negative.  Negative for leg swelling.   Gastrointestinal: Negative.  Negative for abdominal pain.   Endocrine: Negative.  Negative for polyuria.   Genitourinary: Negative.  Negative for difficulty urinating.   Musculoskeletal:  Positive for arthralgias.   Skin: Negative.    Allergic/Immunologic: Negative.    Neurological: Negative.    Hematological: Negative.  Negative for adenopathy.   Psychiatric/Behavioral: Negative.  Negative for behavioral problems.         Pertinent review of systems per HPI.     I reviewed the patient's chief complaint, history of present illness, review of systems, past medical history, surgical history, family history, social history, medications and allergy list in the EMR on 05/02/2024 and agree with the findings above.    Objective    Vital Signs:   Vitals:     "05/02/24 1505   BP: 142/78   Weight: 83.2 kg (183 lb 6.4 oz)   Height: 160.7 cm (63.27\")     BMI: Body mass index is 32.21 kg/m².    General Appearance: No acute distress. Alert and oriented.     Chest:  Non-labored breathing on room air. Regular rate and rhythm.    Upper Extremity Exam:    Left small finger with swelling of the PIP.  She is tender to palpation on the radial and ulnar aspects.  Patient has limited flexion to approximately 20 degrees active range of motion.  There is crepitus with flexion.  She is in a unable to make a full composite fist due to her left small finger.  She has minimal tenderness to palpation left index finger PIP with full range of motion.  No crepitus.    Fingers are warm, well-perfused with appropriate capillary refill.  Palpable radial pulse.    Sensation intact to light touch in median, radial and ulnar nerve distributions.    Motor- Fires [employer], ulnar intrinsics, EPL/EDC w/ full active and passive range of motion. Strength intact.    Non-tender except for in the areas highlighted    Imaging/Studies:   Imaging Results (Last 24 Hours)       Procedure Component Value Units Date/Time    XR Hand 3+ View Left [[id number]] Resulted: 05/02/24 1529     Updated: 05/02/24 1531    Narrative:      Left Hand X-Ray    Indication: Pain    Views:  AP, Lateral, and Oblique     Comparison:  None    Findings:  No fracture  No bony lesion  Normal soft tissues  Significant degenerative changes to the PIP joint of the left small finger   with loss of the entire articular surface.    Impression:   Significant distraction of the left small finger PIP joint.                  Procedures:  Procedures    Quality Measures:   ACP:   ACP discussion was deferred.    Tobacco:   Carlota Max  reports that she has never smoked. She has never been exposed to tobacco smoke. She has never used smokeless tobacco.      Assessment / Plan    Assessment/Plan:     There are no diagnoses linked to this encounter.     Carlota" Lin a 63 y.o. female who presents with:      ICD-10-CM ICD-9-CM   1. Arthritis of finger of left hand  M19.042 716.94   2. Left hand pain  M79.642 729.5         Patient presents with significant arthritis of the left small finger PIP with complete loss of joint service.  Discussed that treatment options for her degree of PIP arthritis includes observation with anti-inflammatories, hand therapy with custom small finger PIP splint versus left small finger PIP arthrodesis, which would address her pain but she would lose all motion of the joint.  She would like to continue with observation and hand therapy for custom splint at this time.  Referral entered.  Recommend follow-up as needed if symptoms worsen or fail to improve.    Follow Up:   Return if symptoms worsen or fail to improve.        Jem Shi MD  [facility] Hand and Upper Extremity Surgeon

## 2024-05-14 ENCOUNTER — TREATMENT (OUTPATIENT)
Dept: PHYSICAL THERAPY | Facility: CLINIC | Age: 64
End: 2024-05-14
Payer: COMMERCIAL

## 2024-05-14 DIAGNOSIS — M20.019 MALLET DEFORMITY OF LITTLE FINGER: ICD-10-CM

## 2024-05-14 DIAGNOSIS — M19.042 ARTHRITIS OF FINGER OF LEFT HAND: Primary | ICD-10-CM

## 2024-05-14 PROCEDURE — 97530 THERAPEUTIC ACTIVITIES: CPT | Performed by: PHYSICAL THERAPIST

## 2024-05-14 PROCEDURE — 97161 PT EVAL LOW COMPLEX 20 MIN: CPT | Performed by: PHYSICAL THERAPIST

## 2024-05-14 PROCEDURE — L3923 HFO WITHOUT JOINTS PRE CST: HCPCS | Performed by: PHYSICAL THERAPIST

## 2024-05-14 NOTE — PROGRESS NOTES
Carlota Santana 1960   Diagnosis/ Surgery: left small finger arthritis and mallet finger deformity.               Date Of Injury: 2 years     Date Of Surgery:NA    Hand Dominance: right   History of Present Condition: Believes issues started about 2 years ago. More recently hit tip of finger against a box and can't straighten it out.   Medical/Vocational History/ Medications: none relevant. Patient is Setswana speaking, translation through [employer] translate     Pain: moderate at PIPJ    Edema: None   Sensibility: WNL   Wound Status:None   ROM/ Strength: left active index finger PIPJ flexion 90 deg; left active small finger PIPJ flexion 35 deg (improved to 45 deg after joint mobilization): small finger DIPJ extension passively full and actively 30 deg from neutral.       Therapy Goals (8 weeks):  Patient demonstrates left active small finger PIPJ flexion of at least 45 degrees to improve .   Patient demonstrates active small finger DIPJ extension of at least 10 degrees from neutral.   Patient is independent with HEP.  Plan:  Patient HEP includes left PIPJ joint blocking, bilateral tendon glide sequence. Both within DIPJ extension splint.    Patient advised to contact therapist with any additional questions or concerns regarding the HEP and/or instructions given.       Splinting:  Patient was measure and fit with a custom fabricated mallet finger splint for left small finger DIPJ extension.    Patient was instructed in wearing schedule, precautions and care of the splint during this visit.   Patient was instructed in proper donning/doffing of splint.   Assessment:  Patient was fitted and appropriate splint was fabricated this date.  Patient reported that splint was comfortable and had no complications with the fit of the splint.  Patient was instructed and patient verbalized understanding of precautions, wear and care of the splint.   Patient demonstrated independent donning/doffing of splint during treatment  today.  Splinting Goals:   MET ALL   Patient was fitted properly with appropriate splint for diagnosis  Patient was educated on precautions, wear schedule and care of splint  Patient demonstrated independence with donning/doffing of the splint.  Splint was provided to Protect Healing Structures, Restrict Mobility, Improve joint alignment.  Plan:  Patient advised to contact therapist with any additional questions or concerns regarding the fit and function of the splint.  Patient will be seen for splint issues as needed   Wear Instructions: Off for hygiene without letting DIPJ bend.     Additional plan: would like to follow up at least once in 2 weeks to check DIPJ extension and PIPJ flexion.   Patient likely will need to be consistent with DIPJ extension splint for at minimum 6 weeks then sleeping in it when she can wean off. Splint may limit progress of PIPJ flexion. She may be appropriate for reassessment when she is done with DIPJ extension splint to address any remaining stiffness issues.     Frequency: 1x every 2 weeks for 8 weeks     Manual Therapy:                 mins  74404;  Therapeutic Exercise:         mins  84706;     Neuromuscular Samy:        mins  50190;    Therapeutic Activity:      15     mins  45553;     Gait Training:                      mins  47435;     Ultrasound:                          mins  59825;    Electrical Stimulation:         mins  27874 ( );  Dry Needling                       mins self-pay    Splint fabrication (separate charge): 15 min      Timed Treatment:   15   mins   Total Treatment:     45   mins    PT SIGNATURE: Ami Harrison PT   DATE TREATMENT INITIATED: 5/14/2024    Initial Certification  Certification Period: 8/12/2024  I certify that the therapy services are furnished while this patient is under my care.  The services outlined above are required by this patient, and will be reviewed every 90 days.     PHYSICIAN: Jem Shi MD      DATE:     Please sign  and return via fax to [phone].. Thank you, [facility].

## 2024-06-03 ENCOUNTER — TELEPHONE (OUTPATIENT)
Dept: GASTROENTEROLOGY | Facility: CLINIC | Age: 64
End: 2024-06-03

## 2024-06-03 NOTE — TELEPHONE ENCOUNTER
Caller: NIMESH CAT    Relationship: Emergency Contact    Best call back number: [phone]    What is the best time to reach you: ANYTIME    Who are you requesting to speak with (clinical staff, provider,  specific staff member): FRONT      What was the call regarding: PATIENT'S  STATED THAT HE DOESN'T KNOW IF HER INSURANCE WILL COVER THIS PROCEDURE. HE WANTS TO CANCEL FOR NOW AND WILL RESCHEDULE AT A LATER DATE. HE WOULD POSSIBLY PREFER IT TO BE IN A HOSPITAL INSTEAD OF THE OUTSIDE FACILITY BECAUSE OF INSURANCE REASONS. PLEASE CALL PATIENT'S  TO DISCUSS.

## 2024-06-17 RX ORDER — SODIUM, POTASSIUM,MAG SULFATES 17.5-3.13G
1 SOLUTION, RECONSTITUTED, ORAL ORAL TAKE AS DIRECTED
Qty: 354 ML | Refills: 0 | Status: SHIPPED | OUTPATIENT
Start: 2024-06-17

## 2024-06-21 ENCOUNTER — TREATMENT (OUTPATIENT)
Dept: PHYSICAL THERAPY | Facility: CLINIC | Age: 64
End: 2024-06-21
Payer: COMMERCIAL

## 2024-06-21 DIAGNOSIS — M20.012: ICD-10-CM

## 2024-06-21 DIAGNOSIS — M79.642 LEFT HAND PAIN: Primary | ICD-10-CM

## 2024-06-21 NOTE — PROGRESS NOTES
Physical Therapy Daily Treatment Note         [address] [facility] [address]              [place] [address]    Patient: Carlota Santana   : 1960  Diagnosis/ICD-10 Code:  Left hand pain [M79.642]  Referring practitioner: Jem Shi MD  Date of Initial Visit: Type: THERAPY  Noted: 2024  Today's Date: 2024  Patient seen for 2 sessions         Carlota Santana reports: pain improved        Objective     Left small finger active DIPJ ext 15 deg from neutral  Left small finger active PIPJ flexion 45 deg   Left active index pipj flexion 95 deg         See Exercise, Manual, and Modality Logs for complete treatment.       Assessment/Plan  Improvements in motion and pain with use. Remade small finger DIPJ extension splint.   Progress per Plan of Care  Follow up at least once more to check  progress          Manual Therapy:    15     mins  05968;  Therapeutic Exercise:         mins  02183;     Neuromuscular Samy:        mins  21934;    Therapeutic Activity:     15     mins  58854;     Gait Training:           mins  69331;     Ultrasound:          mins  87713;    Electrical Stimulation:         mins  97581 ( );  Dry Needling         mins self-pay    Timed Treatment:   30   mins   Total Treatment:     30   mins    Ami Harrison PT  Physical Therapist

## 2024-07-12 ENCOUNTER — TREATMENT (OUTPATIENT)
Dept: PHYSICAL THERAPY | Facility: CLINIC | Age: 64
End: 2024-07-12
Payer: COMMERCIAL

## 2024-07-12 DIAGNOSIS — M20.012: ICD-10-CM

## 2024-07-12 DIAGNOSIS — M79.642 LEFT HAND PAIN: Primary | ICD-10-CM

## 2024-07-12 NOTE — PROGRESS NOTES
Physical Therapy Daily Treatment Note         [address] [facility] [address]              [place] [address]    Patient: Carlota Santana   : 1960  Diagnosis/ICD-10 Code:  Left hand pain [M79.642]  Referring practitioner: Jem Shi MD  Date of Initial Visit: Type: THERAPY  Noted: 2024  Today's Date: 2024  Patient seen for 3 sessions         Carlota Santana reports: lost her splint and needs a new one         Objective   See Exercise, Manual, and Modality Logs for complete treatment.       Assessment/Plan  Fabricated new DIPJ splint.   Progress per Plan of Care           Manual Therapy:    15     mins  76945;  Therapeutic Exercise:         mins  06653;     Neuromuscular Samy:        mins  07050;    Therapeutic Activity:     15     mins  56023;     Gait Training:           mins  78036;     Ultrasound:          mins  36860;    Electrical Stimulation:         mins  28138 ( );  Dry Needling          mins self-pay    Timed Treatment:   30   mins   Total Treatment:     30   mins    Ami Harrison PT  Physical Therapist

## 2024-08-23 ENCOUNTER — TELEPHONE (OUTPATIENT)
Age: 64
End: 2024-08-23
Payer: COMMERCIAL

## 2024-08-23 NOTE — TELEPHONE ENCOUNTER
We have made multiple attempts to reach the patient to [facility] and she hasn't answered or returned our calls. I have mailed a letter to her and have closed the referral. If she decides to schedule, we can reopen the referral for her to schedule.

## 2025-01-15 ENCOUNTER — APPOINTMENT (OUTPATIENT)
Facility: HOSPITAL | Age: 65
End: 2025-01-15
Payer: COMMERCIAL

## 2025-01-15 ENCOUNTER — HOSPITAL ENCOUNTER (OUTPATIENT)
Facility: HOSPITAL | Age: 65
Setting detail: OBSERVATION
Discharge: HOME | End: 2025-01-19
Attending: STUDENT IN AN ORGANIZED HEALTH CARE EDUCATION/TRAINING PROGRAM | Admitting: INTERNAL MEDICINE
Payer: COMMERCIAL

## 2025-01-15 DIAGNOSIS — A41.9 SEPSIS WITHOUT ACUTE ORGAN DYSFUNCTION, DUE TO UNSPECIFIED ORGANISM: ICD-10-CM

## 2025-01-15 DIAGNOSIS — J18.9 MULTIFOCAL PNEUMONIA: Primary | ICD-10-CM

## 2025-01-15 DIAGNOSIS — I27.20 PULMONARY HYPERTENSION: ICD-10-CM

## 2025-01-15 DIAGNOSIS — J96.01 ACUTE RESPIRATORY FAILURE WITH HYPOXIA: ICD-10-CM

## 2025-01-15 DIAGNOSIS — J10.1 INFLUENZA A: ICD-10-CM

## 2025-01-15 DIAGNOSIS — G47.33 OSA (OBSTRUCTIVE SLEEP APNEA): ICD-10-CM

## 2025-01-15 LAB
ALBUMIN SERPL-MCNC: 3.8 G/DL (ref 3.5–5.2)
ALBUMIN/GLOB SERPL: 1 G/DL
ALP SERPL-CCNC: 109 U/L (ref 39–117)
ALT SERPL W P-5'-P-CCNC: 13 U/L (ref 1–33)
ANION GAP SERPL CALCULATED.3IONS-SCNC: 14.8 MMOL/L (ref 5–15)
AST SERPL-CCNC: 43 U/L (ref 1–32)
B PARAPERT DNA SPEC QL NAA+PROBE: NOT DETECTED
B PERT DNA SPEC QL NAA+PROBE: NOT DETECTED
BILIRUB SERPL-MCNC: 0.5 MG/DL (ref 0–1.2)
BILIRUB UR QL STRIP: NEGATIVE
BUN SERPL-MCNC: 24 MG/DL (ref 8–23)
BUN/CREAT SERPL: 22.9 (ref 7–25)
C PNEUM DNA NPH QL NAA+NON-PROBE: NOT DETECTED
CALCIUM SPEC-SCNC: 9 MG/DL (ref 8.6–10.5)
CHLORIDE SERPL-SCNC: 97 MMOL/L (ref 98–107)
CLARITY UR: CLEAR
CO2 SERPL-SCNC: 22.2 MMOL/L (ref 22–29)
COLOR UR: YELLOW
CREAT SERPL-MCNC: 1.05 MG/DL (ref 0.57–1)
D DIMER PPP FEU-MCNC: 1.69 MCGFEU/ML (ref 0–0.63)
D-LACTATE SERPL-SCNC: 2.9 MMOL/L (ref 0.5–2)
D-LACTATE SERPL-SCNC: 3.3 MMOL/L (ref 0.5–2)
DEPRECATED RDW RBC AUTO: 48.7 FL (ref 37–54)
EGFRCR SERPLBLD CKD-EPI 2021: 59.8 ML/MIN/1.73
ELLIPTOCYTES BLD QL SMEAR: ABNORMAL
ERYTHROCYTE [DISTWIDTH] IN BLOOD BY AUTOMATED COUNT: 15.1 % (ref 12.3–15.4)
FLUAV H1 2009 PAND RNA NPH QL NAA+PROBE: NOT DETECTED
FLUAV H1 HA GENE NPH QL NAA+PROBE: NOT DETECTED
FLUAV H3 RNA NPH QL NAA+PROBE: DETECTED
FLUAV SUBTYP SPEC NAA+PROBE: NOT DETECTED
FLUBV RNA ISLT QL NAA+PROBE: NOT DETECTED
GEN 5 1HR TROPONIN T REFLEX: 8 NG/L
GLOBULIN UR ELPH-MCNC: 3.7 GM/DL
GLUCOSE SERPL-MCNC: 161 MG/DL (ref 65–99)
GLUCOSE UR STRIP-MCNC: NEGATIVE MG/DL
HADV DNA SPEC NAA+PROBE: NOT DETECTED
HCOV 229E RNA SPEC QL NAA+PROBE: NOT DETECTED
HCOV HKU1 RNA SPEC QL NAA+PROBE: NOT DETECTED
HCOV NL63 RNA SPEC QL NAA+PROBE: NOT DETECTED
HCOV OC43 RNA SPEC QL NAA+PROBE: NOT DETECTED
HCT VFR BLD AUTO: 36.4 % (ref 34–46.6)
HGB BLD-MCNC: 11.8 G/DL (ref 12–15.9)
HGB UR QL STRIP.AUTO: NEGATIVE
HMPV RNA NPH QL NAA+NON-PROBE: NOT DETECTED
HOLD SPECIMEN: NORMAL
HPIV1 RNA ISLT QL NAA+PROBE: NOT DETECTED
HPIV2 RNA SPEC QL NAA+PROBE: NOT DETECTED
HPIV3 RNA NPH QL NAA+PROBE: NOT DETECTED
HPIV4 P GENE NPH QL NAA+PROBE: NOT DETECTED
KETONES UR QL STRIP: NEGATIVE
LEUKOCYTE ESTERASE UR QL STRIP.AUTO: NEGATIVE
LYMPHOCYTES # BLD MANUAL: 0.86 10*3/MM3 (ref 0.7–3.1)
LYMPHOCYTES NFR BLD MANUAL: 4 % (ref 5–12)
M PNEUMO IGG SER IA-ACNC: NOT DETECTED
MAGNESIUM SERPL-MCNC: 2.1 MG/DL (ref 1.6–2.4)
MCH RBC QN AUTO: 28.2 PG (ref 26.6–33)
MCHC RBC AUTO-ENTMCNC: 32.4 G/DL (ref 31.5–35.7)
MCV RBC AUTO: 86.9 FL (ref 79–97)
METAMYELOCYTES NFR BLD MANUAL: 7 % (ref 0–0)
MONOCYTES # BLD: 0.69 10*3/MM3 (ref 0.1–0.9)
NEUTROPHILS # BLD AUTO: 14.5 10*3/MM3 (ref 1.7–7)
NEUTROPHILS NFR BLD MANUAL: 49 % (ref 42.7–76)
NEUTS BAND NFR BLD MANUAL: 35 % (ref 0–5)
NITRITE UR QL STRIP: NEGATIVE
NT-PROBNP SERPL-MCNC: 1527 PG/ML (ref 0–900)
PH UR STRIP.AUTO: 5.5 [PH] (ref 5–8)
PLAT MORPH BLD: NORMAL
PLATELET # BLD AUTO: 283 10*3/MM3 (ref 140–450)
PMV BLD AUTO: 9.8 FL (ref 6–12)
POTASSIUM SERPL-SCNC: 4.2 MMOL/L (ref 3.5–5.2)
PROCALCITONIN SERPL-MCNC: 6.09 NG/ML (ref 0–0.25)
PROT SERPL-MCNC: 7.5 G/DL (ref 6–8.5)
PROT UR QL STRIP: NEGATIVE
RBC # BLD AUTO: 4.19 10*6/MM3 (ref 3.77–5.28)
RHINOVIRUS RNA SPEC NAA+PROBE: NOT DETECTED
RSV RNA NPH QL NAA+NON-PROBE: NOT DETECTED
SARS-COV-2 RNA RESP QL NAA+PROBE: NOT DETECTED
SODIUM SERPL-SCNC: 134 MMOL/L (ref 136–145)
SP GR UR STRIP: <=1.005 (ref 1–1.03)
TROPONIN T NUMERIC DELTA: -3 NG/L
TROPONIN T SERPL HS-MCNC: 11 NG/L
UROBILINOGEN UR QL STRIP: NORMAL
VARIANT LYMPHS NFR BLD MANUAL: 5 % (ref 19.6–45.3)
WBC MORPH BLD: NORMAL
WBC NRBC COR # BLD AUTO: 17.26 10*3/MM3 (ref 3.4–10.8)
WHOLE BLOOD HOLD COAG: NORMAL
WHOLE BLOOD HOLD SPECIMEN: NORMAL

## 2025-01-15 PROCEDURE — 25810000003 SODIUM CHLORIDE 0.9 % SOLUTION: Performed by: PHYSICIAN ASSISTANT

## 2025-01-15 PROCEDURE — 96375 TX/PRO/DX INJ NEW DRUG ADDON: CPT

## 2025-01-15 PROCEDURE — 83605 ASSAY OF LACTIC ACID: CPT | Performed by: PHYSICIAN ASSISTANT

## 2025-01-15 PROCEDURE — 85025 COMPLETE CBC W/AUTO DIFF WBC: CPT | Performed by: STUDENT IN AN ORGANIZED HEALTH CARE EDUCATION/TRAINING PROGRAM

## 2025-01-15 PROCEDURE — G0378 HOSPITAL OBSERVATION PER HR: HCPCS

## 2025-01-15 PROCEDURE — 99285 EMERGENCY DEPT VISIT HI MDM: CPT

## 2025-01-15 PROCEDURE — 83880 ASSAY OF NATRIURETIC PEPTIDE: CPT | Performed by: PHYSICIAN ASSISTANT

## 2025-01-15 PROCEDURE — 0202U NFCT DS 22 TRGT SARS-COV-2: CPT | Performed by: PHYSICIAN ASSISTANT

## 2025-01-15 PROCEDURE — 25010000002 DEXAMETHASONE PER 1 MG: Performed by: PHYSICIAN ASSISTANT

## 2025-01-15 PROCEDURE — 87040 BLOOD CULTURE FOR BACTERIA: CPT | Performed by: PHYSICIAN ASSISTANT

## 2025-01-15 PROCEDURE — 80053 COMPREHEN METABOLIC PANEL: CPT | Performed by: STUDENT IN AN ORGANIZED HEALTH CARE EDUCATION/TRAINING PROGRAM

## 2025-01-15 PROCEDURE — 94799 UNLISTED PULMONARY SVC/PX: CPT

## 2025-01-15 PROCEDURE — 96361 HYDRATE IV INFUSION ADD-ON: CPT

## 2025-01-15 PROCEDURE — 85007 BL SMEAR W/DIFF WBC COUNT: CPT | Performed by: STUDENT IN AN ORGANIZED HEALTH CARE EDUCATION/TRAINING PROGRAM

## 2025-01-15 PROCEDURE — 94640 AIRWAY INHALATION TREATMENT: CPT

## 2025-01-15 PROCEDURE — 96372 THER/PROPH/DIAG INJ SC/IM: CPT

## 2025-01-15 PROCEDURE — 25010000002 FUROSEMIDE PER 20 MG: Performed by: PHYSICIAN ASSISTANT

## 2025-01-15 PROCEDURE — 96365 THER/PROPH/DIAG IV INF INIT: CPT

## 2025-01-15 PROCEDURE — 93005 ELECTROCARDIOGRAM TRACING: CPT | Performed by: STUDENT IN AN ORGANIZED HEALTH CARE EDUCATION/TRAINING PROGRAM

## 2025-01-15 PROCEDURE — 71045 X-RAY EXAM CHEST 1 VIEW: CPT

## 2025-01-15 PROCEDURE — 71275 CT ANGIOGRAPHY CHEST: CPT

## 2025-01-15 PROCEDURE — 85379 FIBRIN DEGRADATION QUANT: CPT | Performed by: PHYSICIAN ASSISTANT

## 2025-01-15 PROCEDURE — 25810000003 LACTATED RINGERS SOLUTION: Performed by: PHYSICIAN ASSISTANT

## 2025-01-15 PROCEDURE — 84145 PROCALCITONIN (PCT): CPT | Performed by: PHYSICIAN ASSISTANT

## 2025-01-15 PROCEDURE — 81003 URINALYSIS AUTO W/O SCOPE: CPT | Performed by: STUDENT IN AN ORGANIZED HEALTH CARE EDUCATION/TRAINING PROGRAM

## 2025-01-15 PROCEDURE — 25510000001 IOPAMIDOL PER 1 ML: Performed by: STUDENT IN AN ORGANIZED HEALTH CARE EDUCATION/TRAINING PROGRAM

## 2025-01-15 PROCEDURE — 84484 ASSAY OF TROPONIN QUANT: CPT | Performed by: STUDENT IN AN ORGANIZED HEALTH CARE EDUCATION/TRAINING PROGRAM

## 2025-01-15 PROCEDURE — 25010000002 CEFTRIAXONE PER 250 MG: Performed by: PHYSICIAN ASSISTANT

## 2025-01-15 PROCEDURE — 36415 COLL VENOUS BLD VENIPUNCTURE: CPT

## 2025-01-15 PROCEDURE — 83735 ASSAY OF MAGNESIUM: CPT | Performed by: STUDENT IN AN ORGANIZED HEALTH CARE EDUCATION/TRAINING PROGRAM

## 2025-01-15 RX ORDER — ACETAMINOPHEN 650 MG/1
650 SUPPOSITORY RECTAL EVERY 4 HOURS PRN
Status: DISCONTINUED | OUTPATIENT
Start: 2025-01-15 | End: 2025-01-16 | Stop reason: SDUPTHER

## 2025-01-15 RX ORDER — ACETAMINOPHEN 160 MG/5ML
650 SOLUTION ORAL EVERY 4 HOURS PRN
Status: DISCONTINUED | OUTPATIENT
Start: 2025-01-15 | End: 2025-01-16 | Stop reason: SDUPTHER

## 2025-01-15 RX ORDER — AMLODIPINE BESYLATE 5 MG/1
5 TABLET ORAL DAILY
Status: DISCONTINUED | OUTPATIENT
Start: 2025-01-16 | End: 2025-01-19 | Stop reason: HOSPADM

## 2025-01-15 RX ORDER — LOSARTAN POTASSIUM 50 MG/1
100 TABLET ORAL DAILY
Status: DISCONTINUED | OUTPATIENT
Start: 2025-01-16 | End: 2025-01-19 | Stop reason: HOSPADM

## 2025-01-15 RX ORDER — FUROSEMIDE 10 MG/ML
40 INJECTION INTRAMUSCULAR; INTRAVENOUS ONCE
Status: COMPLETED | OUTPATIENT
Start: 2025-01-15 | End: 2025-01-15

## 2025-01-15 RX ORDER — FAMOTIDINE 20 MG/1
40 TABLET, FILM COATED ORAL DAILY
Status: DISCONTINUED | OUTPATIENT
Start: 2025-01-16 | End: 2025-01-17

## 2025-01-15 RX ORDER — SODIUM CHLORIDE 9 MG/ML
40 INJECTION, SOLUTION INTRAVENOUS AS NEEDED
Status: DISCONTINUED | OUTPATIENT
Start: 2025-01-15 | End: 2025-01-19 | Stop reason: HOSPADM

## 2025-01-15 RX ORDER — ONDANSETRON 2 MG/ML
4 INJECTION INTRAMUSCULAR; INTRAVENOUS EVERY 6 HOURS PRN
Status: DISCONTINUED | OUTPATIENT
Start: 2025-01-15 | End: 2025-01-19 | Stop reason: HOSPADM

## 2025-01-15 RX ORDER — DEXAMETHASONE SODIUM PHOSPHATE 10 MG/ML
10 INJECTION INTRAMUSCULAR; INTRAVENOUS DAILY
Status: DISCONTINUED | OUTPATIENT
Start: 2025-01-16 | End: 2025-01-16

## 2025-01-15 RX ORDER — SODIUM CHLORIDE 0.9 % (FLUSH) 0.9 %
10 SYRINGE (ML) INJECTION EVERY 12 HOURS SCHEDULED
Status: DISCONTINUED | OUTPATIENT
Start: 2025-01-15 | End: 2025-01-19 | Stop reason: HOSPADM

## 2025-01-15 RX ORDER — SODIUM CHLORIDE 0.9 % (FLUSH) 0.9 %
10 SYRINGE (ML) INJECTION AS NEEDED
Status: DISCONTINUED | OUTPATIENT
Start: 2025-01-15 | End: 2025-01-19 | Stop reason: HOSPADM

## 2025-01-15 RX ORDER — SODIUM CHLORIDE 9 MG/ML
125 INJECTION, SOLUTION INTRAVENOUS CONTINUOUS
Status: DISCONTINUED | OUTPATIENT
Start: 2025-01-15 | End: 2025-01-16

## 2025-01-15 RX ORDER — OSELTAMIVIR PHOSPHATE 75 MG/1
75 CAPSULE ORAL EVERY 12 HOURS SCHEDULED
Status: DISCONTINUED | OUTPATIENT
Start: 2025-01-16 | End: 2025-01-19 | Stop reason: HOSPADM

## 2025-01-15 RX ORDER — IPRATROPIUM BROMIDE AND ALBUTEROL SULFATE 2.5; .5 MG/3ML; MG/3ML
3 SOLUTION RESPIRATORY (INHALATION) ONCE
Status: COMPLETED | OUTPATIENT
Start: 2025-01-15 | End: 2025-01-15

## 2025-01-15 RX ORDER — AZITHROMYCIN 250 MG/1
500 TABLET, FILM COATED ORAL ONCE
Status: COMPLETED | OUTPATIENT
Start: 2025-01-15 | End: 2025-01-15

## 2025-01-15 RX ORDER — OSELTAMIVIR PHOSPHATE 75 MG/1
75 CAPSULE ORAL ONCE
Status: COMPLETED | OUTPATIENT
Start: 2025-01-15 | End: 2025-01-15

## 2025-01-15 RX ORDER — OSELTAMIVIR PHOSPHATE 75 MG/1
75 CAPSULE ORAL EVERY 12 HOURS SCHEDULED
Status: DISCONTINUED | OUTPATIENT
Start: 2025-01-15 | End: 2025-01-15

## 2025-01-15 RX ORDER — IOPAMIDOL 755 MG/ML
100 INJECTION, SOLUTION INTRAVASCULAR
Status: COMPLETED | OUTPATIENT
Start: 2025-01-15 | End: 2025-01-15

## 2025-01-15 RX ORDER — IPRATROPIUM BROMIDE AND ALBUTEROL SULFATE 2.5; .5 MG/3ML; MG/3ML
3 SOLUTION RESPIRATORY (INHALATION)
Status: DISCONTINUED | OUTPATIENT
Start: 2025-01-15 | End: 2025-01-17

## 2025-01-15 RX ORDER — DEXAMETHASONE SODIUM PHOSPHATE 10 MG/ML
10 INJECTION INTRAMUSCULAR; INTRAVENOUS ONCE
Status: COMPLETED | OUTPATIENT
Start: 2025-01-15 | End: 2025-01-15

## 2025-01-15 RX ORDER — PANTOPRAZOLE SODIUM 40 MG/1
40 TABLET, DELAYED RELEASE ORAL
Status: DISCONTINUED | OUTPATIENT
Start: 2025-01-16 | End: 2025-01-19 | Stop reason: HOSPADM

## 2025-01-15 RX ORDER — ACETAMINOPHEN 325 MG/1
650 TABLET ORAL EVERY 4 HOURS PRN
Status: DISCONTINUED | OUTPATIENT
Start: 2025-01-15 | End: 2025-01-16 | Stop reason: SDUPTHER

## 2025-01-15 RX ADMIN — SODIUM CHLORIDE, SODIUM LACTATE, POTASSIUM CHLORIDE, CALCIUM CHLORIDE 560 ML: 20; 30; 600; 310 INJECTION, SOLUTION INTRAVENOUS at 19:47

## 2025-01-15 RX ADMIN — DEXAMETHASONE SODIUM PHOSPHATE 10 MG: 10 INJECTION INTRAMUSCULAR; INTRAVENOUS at 16:56

## 2025-01-15 RX ADMIN — IPRATROPIUM BROMIDE AND ALBUTEROL SULFATE 3 ML: 2.5; .5 SOLUTION RESPIRATORY (INHALATION) at 17:00

## 2025-01-15 RX ADMIN — SODIUM CHLORIDE, POTASSIUM CHLORIDE, SODIUM LACTATE AND CALCIUM CHLORIDE 500 ML: 600; 310; 30; 20 INJECTION, SOLUTION INTRAVENOUS at 19:14

## 2025-01-15 RX ADMIN — SODIUM CHLORIDE 125 ML/HR: 9 INJECTION, SOLUTION INTRAVENOUS at 21:54

## 2025-01-15 RX ADMIN — Medication 10 ML: at 21:54

## 2025-01-15 RX ADMIN — IPRATROPIUM BROMIDE AND ALBUTEROL SULFATE 3 ML: 2.5; .5 SOLUTION RESPIRATORY (INHALATION) at 22:18

## 2025-01-15 RX ADMIN — OSELTAMIVIR PHOSPHATE 75 MG: 75 CAPSULE ORAL at 19:43

## 2025-01-15 RX ADMIN — CEFTRIAXONE SODIUM 2000 MG: 2 INJECTION, POWDER, FOR SOLUTION INTRAMUSCULAR; INTRAVENOUS at 18:10

## 2025-01-15 RX ADMIN — AZITHROMYCIN DIHYDRATE 500 MG: 250 TABLET ORAL at 18:10

## 2025-01-15 RX ADMIN — IOPAMIDOL 75 ML: 755 INJECTION, SOLUTION INTRAVENOUS at 18:16

## 2025-01-15 RX ADMIN — FUROSEMIDE 40 MG: 10 INJECTION, SOLUTION INTRAMUSCULAR; INTRAVENOUS at 18:10

## 2025-01-15 NOTE — FSED PROVIDER NOTE
Subjective  History of Present Illness:    Patient is a 64-year-old female with medical history of hypertension presents emergency department for evaluation of cough, congestion, dyspnea, fatigue.  Patient developed upper respiratory symptoms yesterday.  Today patient reported dyspnea and fatigue with exertion and they checked her oxygen level with a pulse oximeter and found her O2 to be in the 60s and 70s.  Patient does not wear oxygen at home.  Patient denies history of COPD, asthma, pulmonary or cardiac disease.  Patient denies history of MI, DVT or PE.  Patient's  also has had upper respiratory symptoms recently.  Patient denies chest pain.  Patient denies dizziness, syncope.  Patient denies nausea, vomiting, diarrhea.   used.      Nurses Notes reviewed and agree, including vitals, allergies, social history and prior medical history.     REVIEW OF SYSTEMS: All systems reviewed and not pertinent unless noted.  Review of Systems   Constitutional:  Positive for fatigue.   Respiratory:  Positive for cough and shortness of breath.    All other systems reviewed and are negative.      Past Medical History:   Diagnosis Date    Hypertension        Allergies:    Patient has no known allergies.      Past Surgical History:   Procedure Laterality Date     SECTION  1989    GASTRIC BYPASS           Social History     Socioeconomic History    Marital status:    Tobacco Use    Smoking status: Never     Passive exposure: Never    Smokeless tobacco: Never   Vaping Use    Vaping status: Never Used   Substance and Sexual Activity    Alcohol use: Yes     Alcohol/week: 3.0 standard drinks of alcohol     Types: 3 Glasses of wine per week     Comment: 3 weekly    Drug use: Never    Sexual activity: Not Currently     Partners: Male     Birth control/protection: Post-menopausal, Partner of same sex, Surgical         Family History   Problem Relation Age of Onset    Mental illness Mother      "Cancer Father        Objective  Physical Exam:  /72   Pulse 94   Temp 98.8 °F (37.1 °C) (Oral)   Resp 14   Ht 160 cm (62.99\")   Wt 83.9 kg (185 lb)   SpO2 94%   BMI 32.78 kg/m²      Physical Exam  Vitals and nursing note reviewed.   Constitutional:       General: She is not in acute distress.     Appearance: Normal appearance. She is not toxic-appearing.   HENT:      Head: Normocephalic and atraumatic.      Nose: Nose normal.      Mouth/Throat:      Mouth: Mucous membranes are moist.   Eyes:      Extraocular Movements: Extraocular movements intact.      Conjunctiva/sclera: Conjunctivae normal.      Pupils: Pupils are equal, round, and reactive to light.   Cardiovascular:      Rate and Rhythm: Normal rate and regular rhythm.      Pulses: Normal pulses.      Heart sounds: Normal heart sounds. No murmur heard.  Pulmonary:      Effort: Pulmonary effort is normal. No respiratory distress.      Breath sounds: Normal breath sounds. No stridor. No wheezing.   Abdominal:      General: Abdomen is flat. There is no distension.      Palpations: Abdomen is soft. There is no mass.      Tenderness: There is no abdominal tenderness. There is no guarding.   Musculoskeletal:         General: No deformity. Normal range of motion.      Cervical back: Normal range of motion and neck supple.      Right lower leg: No edema.      Left lower leg: No edema.   Skin:     General: Skin is warm and dry.      Capillary Refill: Capillary refill takes less than 2 seconds.      Findings: No rash.   Neurological:      General: No focal deficit present.      Mental Status: She is alert and oriented to person, place, and time.      Cranial Nerves: No cranial nerve deficit.      Sensory: No sensory deficit.      Gait: Gait normal.   Psychiatric:         Mood and Affect: Mood normal.         Behavior: Behavior normal.         Procedures    ED Course:         Lab Results (last 24 hours)       Procedure Component Value Units Date/Time    CBC & " Differential [[id number]]  (Abnormal) Collected: 01/15/25 1643    Specimen: Blood Updated: 01/15/25 7980    Narrative:      The following orders were created for panel order CBC & Differential.  Procedure                               Abnormality         Status                     ---------                               -----------         ------                     CBC Auto Differential[[id number]]        Abnormal            Final result               Scan Slide[[id number]]                                                                    Please view results for these tests on the individual orders.    Comprehensive Metabolic Panel [[id number]]  (Abnormal) Collected: 01/15/25 1643    Specimen: Blood Updated: 01/15/25 1711     Glucose 161 mg/dL      BUN 24 mg/dL      Creatinine 1.05 mg/dL      Sodium 134 mmol/L      Potassium 4.2 mmol/L      Comment: Specimen hemolyzed.  Result may be falsely elevated.        Chloride 97 mmol/L      CO2 22.2 mmol/L      Calcium 9.0 mg/dL      Total Protein 7.5 g/dL      Albumin 3.8 g/dL      ALT (SGPT) 13 U/L      Comment: Specimen hemolyzed.  Result may  be falsely elevated.        AST (SGOT) 43 U/L      Comment: Specimen hemolyzed.  Result may be falsely elevated.        Alkaline Phosphatase 109 U/L      Total Bilirubin 0.5 mg/dL      Globulin 3.7 gm/dL      A/G Ratio 1.0 g/dL      BUN/Creatinine Ratio 22.9     Anion Gap 14.8 mmol/L      eGFR 59.8 mL/min/1.73     Narrative:      GFR Categories in Chronic Kidney Disease (CKD)      GFR Category          GFR (mL/min/1.73)    Interpretation  G1                     90 or greater         Normal or high (1)  G2                      60-89                Mild decrease (1)  G3a                   45-59                Mild to moderate decrease  G3b                   30-44                Moderate to severe decrease  G4                    15-29                Severe decrease  G5                    14 or less           Kidney failure          (1)In  the absence of evidence of kidney disease, neither GFR category G1 or G2 fulfill the criteria for CKD.    eGFR calculation 2021 CKD-EPI creatinine equation, which does not include race as a factor    High Sensitivity Troponin T [[id number]]  (Normal) Collected: 01/15/25 1643    Specimen: Blood Updated: 01/15/25 1711     HS Troponin T 11 ng/L     Magnesium [[id number]]  (Normal) Collected: 01/15/25 1643    Specimen: Blood Updated: 01/15/25 1710     Magnesium 2.1 mg/dL     CBC Auto Differential [[id number]]  (Abnormal) Collected: 01/15/25 1643    Specimen: Blood Updated: 01/15/25 1729     WBC 17.26 10*3/mm3      RBC 4.19 10*6/mm3      Hemoglobin 11.8 g/dL      Hematocrit 36.4 %      MCV 86.9 fL      MCH 28.2 pg      MCHC 32.4 g/dL      RDW 15.1 %      RDW-SD 48.7 fl      MPV 9.8 fL      Platelets 283 10*3/mm3     BNP [[id number]]  (Abnormal) Collected: 01/15/25 1643    Specimen: Blood Updated: 01/15/25 1718     proBNP 1,527.0 pg/mL     Narrative:      This assay is used as an aid in the diagnosis of individuals suspected of having heart failure. It can be used as an aid in the diagnosis of acute decompensated heart failure (ADHF) in patients presenting with signs and symptoms of ADHF to the emergency department (ED). In addition, NT-proBNP of <300 pg/mL indicates ADHF is not likely.    Age Range Result Interpretation  NT-proBNP Concentration (pg/mL:      <50             Positive            >450                   Gray                 300-450                    Negative             <300    50-75           Positive            >900                  Gray                300-900                  Negative            <300      >75             Positive            >1800                  Gray                300-1800                  Negative            <300    D-dimer, Quantitative [[id number]]  (Abnormal) Collected: 01/15/25 1643    Specimen: Blood Updated: 01/15/25 1719     D-Dimer, Quantitative 1.69 MCGFEU/mL     Narrative:       "According to the assay 's published package insert, a normal (<0.50 MCGFEU/mL) D-dimer result in conjunction with a non-high clinical probability assessment, excludes deep vein thrombosis (DVT) and pulmonary embolism (PE) with high sensitivity.    D-dimer values increase with age and this can make VTE exclusion of an older population difficult. To address this, the [employer], based on best available evidence and recent guidelines, recommends that clinicians use age-adjusted D-dimer thresholds in patients greater than 50 years of age with: a) a low probability of PE who do not meet all Pulmonary Embolism Rule Out Criteria, or b) in those with intermediate probability of PE.   The formula for an age-adjusted D-dimer cut-off is \"age/100\".  For example, a 60 year old patient would have an age-adjusted cut-off of 0.60 MCGFEU/mL and an 80 year old 0.80 MCGFEU/mL.    Manual Differential [[id number]]  (Abnormal) Collected: 01/15/25 1643    Specimen: Blood Updated: 01/15/25 1729     Neutrophil % 49.0 %      Lymphocyte % 5.0 %      Monocyte % 4.0 %      Bands %  35.0 %      Metamyelocyte % 7.0 %      Neutrophils Absolute 14.50 10*3/mm3      Lymphocytes Absolute 0.86 10*3/mm3      Monocytes Absolute 0.69 10*3/mm3      Elliptocytes Slight/1+     WBC Morphology Normal     Platelet Morphology Normal    Lactic Acid, Plasma [[id number]]  (Abnormal) Collected: 01/15/25 1643    Specimen: Blood Updated: 01/15/25 1802     Lactate 2.9 mmol/L     Procalcitonin [[id number]]  (Abnormal) Collected: 01/15/25 1643    Specimen: Blood Updated: 01/15/25 1806     Procalcitonin 6.09 ng/mL     COVID PRE-OP / PRE-PROCEDURE SCREENING ORDER (NO ISOLATION) - Swab, Nasopharynx [[id number]]  (Abnormal) Collected: 01/15/25 1725    Specimen: Swab from Nasopharynx Updated: 01/15/25 1829    Narrative:      The following orders were created for panel order COVID PRE-OP / PRE-PROCEDURE SCREENING ORDER (NO ISOLATION) - Swab, " Nasopharynx.  Procedure                               Abnormality         Status                     ---------                               -----------         ------                     Respiratory Panel PCR w/...[[id number]]  Abnormal            Final result                 Please view results for these tests on the individual orders.    Respiratory Panel PCR w/COVID-19(SARS-CoV-2) ALEJANDRA/CONSTANCE/NITHIN/PAD/COR/SHAUN In-House, NP Swab in UTM/VTM, 2 HR TAT - Swab, Nasopharynx [[id number]]  (Abnormal) Collected: 01/15/25 1725    Specimen: Swab from Nasopharynx Updated: 01/15/25 1829     ADENOVIRUS, PCR Not Detected     Coronavirus 229E Not Detected     Coronavirus HKU1 Not Detected     Coronavirus NL63 Not Detected     Coronavirus OC43 Not Detected     COVID19 Not Detected     Human Metapneumovirus Not Detected     Human Rhinovirus/Enterovirus Not Detected     Influenza A PCR Not Detected     Influenza A H1 Not Detected     Influenza A H1 2009 PCR Not Detected     Influenza A H3 Detected     Influenza B PCR Not Detected     Parainfluenza Virus 1 Not Detected     Parainfluenza Virus 2 Not Detected     Parainfluenza Virus 3 Not Detected     Parainfluenza Virus 4 Not Detected     RSV, PCR Not Detected     Bordetella pertussis pcr Not Detected     Bordetella parapertussis PCR Not Detected     Chlamydophila pneumoniae PCR Not Detected     Mycoplasma pneumo by PCR Not Detected    Narrative:      In the setting of a positive respiratory panel with a viral infection PLUS a negative procalcitonin without other underlying concern for bacterial infection, consider observing off antibiotics or discontinuation of antibiotics and continue supportive care. If the respiratory panel is positive for atypical bacterial infection (Bordetella pertussis, Chlamydophila pneumoniae, or Mycoplasma pneumoniae), consider antibiotic de-escalation to target atypical bacterial infection.    High Sensitivity Troponin T 1Hr [[id number]]  (Normal) Collected:  01/15/25 1807    Specimen: Blood Updated: 01/15/25 1833     HS Troponin T 8 ng/L      Troponin T Numeric Delta -3 ng/L     Urinalysis With Microscopic If Indicated (No Culture) - Urine, Clean Catch [[id number]]  (Normal) Collected: 01/15/25 1841    Specimen: Urine, Clean Catch Updated: 01/15/25 1903     Color, UA Yellow     Appearance, UA Clear     pH, UA 5.5     Specific Gravity, UA <=1.005     Glucose, UA Negative     Ketones, UA Negative     Bilirubin, UA Negative     Blood, UA Negative     Protein, UA Negative     Leuk Esterase, UA Negative     Nitrite, UA Negative     Urobilinogen, UA 0.2 E.U./dL    Narrative:      Urine microscopic not indicated.    STAT Lactic Acid, Reflex [[id number]] Collected: 01/15/25 1946    Specimen: Blood Updated: 01/15/25 1948             CT Angiogram Chest Pulmonary Embolism    Result Date: 1/15/2025  CT ANGIOGRAM CHEST PULMONARY EMBOLISM Date of Exam: 1/15/2025 6:10 PM EST Indication: dyspnea, elevated d dimer. Comparison: Same day chest radiograph Technique: Axial CT images were obtained of the chest after the uneventful intravenous administration of 75 mL Isovue-370 utilizing pulmonary embolism protocol.  In addition, a 3-D volume rendered image was created for interpretation.  Reconstructed coronal and sagittal images were also obtained. Automated exposure control and iterative construction methods were used. Findings: Diagnostic quality: Contrast opacification of the pulmonary arterial circulation is adequate for assessment of pulmonary embolism. Study is significantly limited by respiratory motion artifact. Pulmonary arteries: No evidence of pulmonary embolus. Main pulmonary artery is borderline enlarged measuring 3 cm. Heart and pericardium:No flattening of the interventricular septum. Coronary artery calcification is seen. No substantial pericardial effusion. Vessels:Normal caliber aorta. Atherosclerotic calcification of the aorta. No evidence of acute aortic injury. Venous  structures including the superior vena cava and inferior vena cava appear grossly patent. Mediastinum: Small hiatal hernia. Unremarkable appearance of the esophagus. No enlarged or suspicious mediastinal or hilar lymphadenopathy. No anterior mediastinal masses. Lower neck:No suspicious or enlarged supraclavicular or axillary lymphadenopathy. Normal appearance of the thyroid. Pulmonary parenchyma: Consolidation seen predominantly in the left lower lobe and right middle lobe. Few consolidative opacity seen in the left upper lobe as well. No suspicious pulmonary nodules. Pleura:No evidence of pleural effusion or pneumothorax. Airways:Central and segmental airways are clear. Chest wall and bones:No acute osseous abnormality. No substantial degenerative changes of thoracic spine. Unremarkable appearance of soft tissues. Upper abdomen:No lesion seen within the visualized liver. Postsurgical changes of the stomach. Distended gallbladder without evidence of cholecystitis.     Impression: Impression: 1.Significantly limited examination due to respiratory motion artifact. 2.No evidence of central occlusive pulmonary embolus given these limitations. 3.Multifocal consolidative opacities predominantly in the left lower lobe and right middle lobe. This is most consistent with multifocal pneumonia. 4.Borderline enlarged main pulmonary artery which can be seen in the setting of pulmonary hypertension. Electronically Signed: Landen Ross MD  1/15/2025 7:08 PM EST  Workstation ID: [id number]    XR Chest 1 View    Result Date: 1/15/2025  XR CHEST 1 VW Date of Exam: 1/15/2025 4:34 PM EST Indication: Weak/Dizzy/AMS triage protocol Comparison: None available. Findings: Cardiomediastinal silhouette is within normal limits. Perihilar opacities. No pleural effusion or pneumothorax. No evidence of acute osseous abnormalities. Visualized upper abdomen is unremarkable.     Impression: Impression: Perihilar opacities which may reflect  infection or edema. Electronically Signed: Landen Ross MD  1/15/2025 5:37 PM EST  Workstation ID: [id number]        [facility]      Initial impression of presenting illness: Patient is a 64-year-old female with medical history of hypertension presents emergency department for cough, congestion, bodyaches and chills, dyspnea.  Patient family checked her oxygen at home with pulse oximeter and found it to be in the 60s and 70s prompting them to bring him to the emergency department.    Patient arrives afebrile, hemodynamically stable, oxygen saturation 95% on room air with vitals interpreted by myself.     Pertinent features from physical exam: No adventitious sounds on auscultation.    DDX: includes but is not limited to: Pneumonia, viral syndrome, PE, fluid overload, ACS, arrhythmia, others    Initial diagnostic plan and interventions: Workup includes CBC, CMP, troponin, EKG, chest x-ray, BNP, D-dimer, viral panel.  Interventions include DuoNeb, Decadron.    Diagnostic information from other sources: Previous encounters    Results from initial plan were reviewed and interpreted by me revealing labwork independent reviewed.  Lab work is notable for white count of 17.  Troponin 0 hours unremarkable.  BNP is elevated 1500 without baseline to compare to.  D-dimer is elevated 1.69 and CT PE is ordered.  Chest x-ray shows findings consistent with possible pneumonia versus edema.  Patient has elevated BNP as well as significant leukocytosis so we will treat with antibiotics as well as with Lasix and will further order blood cultures, lactic acid, procalcitonin.    CT PE shows findings of multifocal pneumonia with likely mild pulmonary hypertension.  There is no evidence of PE on scan.  Lactic acid is elevated 2.9 and patient is given 30 cc per Kg IV fluids using ideal body weight.  Procalcitonin is elevated at 6.  Patient will be treated with IV Rocephin and azithromycin.    Re-evaluation: Discussed case with hospitalist as  patient has multifocal pneumonia with pulmonary hypertension and sepsis requires admission.  Due to high patient volume there will be an increased wait to get inpatient room at [facility] so we will admit patient to the CDU for further observation and treatment while Pt stays on a waiting list for full admission to [facility]. Dr. Kerley hospitalist, and patient and family are agreeable with this plan.      Medications   sodium chloride 0.9 % flush 10 mL (has no administration in time range)   lactated ringers bolus 560 mL (560 mL Intravenous New Bag 1/15/25 1947)   ipratropium-albuterol (DUO-NEB) nebulizer solution 3 mL (3 mL Nebulization Given 1/15/25 1700)   dexAMETHasone (DECADRON) injection 10 mg (10 mg Intravenous Given 1/15/25 1656)   furosemide (LASIX) injection 40 mg (40 mg Intravenous Given 1/15/25 1810)   cefTRIAXone (ROCEPHIN) 2,000 mg in sodium chloride 0.9 % 100 mL MBP (0 mg Intravenous Stopped 1/15/25 1916)   azithromycin (ZITHROMAX) tablet 500 mg (500 mg Oral Given 1/15/25 1810)   lactated ringers bolus 500 mL (0 mL Intravenous Stopped 1/15/25 1952)   iopamidol (ISOVUE-370) 76 % injection 100 mL (75 mL Intravenous Given 1/15/25 1816)   lactated ringers bolus 500 mL (500 mL Intravenous New Bag 1/15/25 1914)   oseltamivir (TAMIFLU) capsule 75 mg (75 mg Oral Given 1/15/25 1943)       Results/clinical rationale were discussed with patient and family    Consultations/Discussion of results with other physicians: Attending and Dr. Kerley.    Data interpreted: Nursing notes reviewed, vital signs reviewed.  Labs independently interpreted by me.  Imaging independently interpreted by me.  EKG independently interpreted by me.     Counseling: Discussed the results above with the patient regarding need for admission or discharge.  Patient understands and agrees plan of care.      -----  ED Disposition       ED Disposition   Decision to Admit    Condition   --    Comment   --             Final  diagnoses:   Multifocal pneumonia   Influenza A   Sepsis without acute organ dysfunction, due to unspecified organism   Pulmonary hypertension     Your Follow-Up Providers    Follow-up information has not been specified.       Contact information for after-discharge care    Follow-up information has not been specified.          Your medication list        CONTINUE taking these medications        Instructions Last Dose Given Next Dose Due   amLODIPine 5 MG tablet  Commonly known as: NORVASC      Take 1 tablet by mouth Daily.       Buffered Vitamin C 1000 MG capsule      Take  by mouth.       fluticasone 50 MCG/ACT nasal spray  Commonly known as: Flonase      2 sprays into the nostril(s) as directed by provider Daily.       losartan 100 MG tablet  Commonly known as: COZAAR      Take 1 tablet by mouth Daily.       Magnesium 250 MG tablet      Take  by mouth.       meloxicam 7.5 MG tablet  Commonly known as: MOBIC      Take 1 tablet by mouth Daily.       MULTIVITAMIN ADULT EXTRA C PO      Take  by mouth.       multivitamin tablet tablet      Take  by mouth.       omeprazole 40 MG capsule  Commonly known as: priLOSEC      Take 1 capsule by mouth Daily.       sodium-potassium-magnesium sulfates 17.5-3.13-1.6 GM/177ML solution oral solution  Commonly known as: Suprep Bowel Prep Kit      Take 1 bottle by mouth Take As Directed. Follow instructions that were mailed to your home. If you didn't receive these call [phone].       Vitamin D3 50 MCG (2000 UT) capsule      Take 1 capsule by mouth Daily.       vitamin E 400 UNIT capsule      Take 1 capsule by mouth Daily.

## 2025-01-16 ENCOUNTER — APPOINTMENT (OUTPATIENT)
Dept: CT IMAGING | Facility: HOSPITAL | Age: 65
End: 2025-01-16
Payer: COMMERCIAL

## 2025-01-16 ENCOUNTER — APPOINTMENT (OUTPATIENT)
Facility: HOSPITAL | Age: 65
End: 2025-01-16
Payer: COMMERCIAL

## 2025-01-16 PROBLEM — A41.9 SEPSIS DUE TO PNEUMONIA: Status: ACTIVE | Noted: 2025-01-16

## 2025-01-16 PROBLEM — J18.9 SEPSIS DUE TO PNEUMONIA: Status: ACTIVE | Noted: 2025-01-16

## 2025-01-16 PROBLEM — J96.01 ACUTE RESPIRATORY FAILURE WITH HYPOXIA: Status: ACTIVE | Noted: 2025-01-16

## 2025-01-16 LAB
ACANTHOCYTES BLD QL SMEAR: ABNORMAL
ALBUMIN SERPL-MCNC: 3.2 G/DL (ref 3.5–5.2)
ALBUMIN/GLOB SERPL: 1 G/DL
ALP SERPL-CCNC: 109 U/L (ref 39–117)
ALT SERPL W P-5'-P-CCNC: 13 U/L (ref 1–33)
ANION GAP SERPL CALCULATED.3IONS-SCNC: 16.2 MMOL/L (ref 5–15)
ANION GAP SERPL CALCULATED.3IONS-SCNC: 9.8 MMOL/L (ref 5–15)
ARTERIAL PATENCY WRIST A: ABNORMAL
AST SERPL-CCNC: 32 U/L (ref 1–32)
ATMOSPHERIC PRESS: ABNORMAL MM[HG]
BASE EXCESS BLDA CALC-SCNC: -6 MMOL/L (ref 0–2)
BDY SITE: ABNORMAL
BILIRUB SERPL-MCNC: 0.2 MG/DL (ref 0–1.2)
BODY TEMPERATURE: 37
BUN SERPL-MCNC: 18 MG/DL (ref 8–23)
BUN SERPL-MCNC: 19 MG/DL (ref 8–23)
BUN/CREAT SERPL: 24 (ref 7–25)
BUN/CREAT SERPL: 26.4 (ref 7–25)
CALCIUM SPEC-SCNC: 8.1 MG/DL (ref 8.6–10.5)
CALCIUM SPEC-SCNC: 8.3 MG/DL (ref 8.6–10.5)
CHLORIDE SERPL-SCNC: 104 MMOL/L (ref 98–107)
CHLORIDE SERPL-SCNC: 105 MMOL/L (ref 98–107)
CO2 BLDA-SCNC: 18.5 MMOL/L (ref 22–33)
CO2 SERPL-SCNC: 16.8 MMOL/L (ref 22–29)
CO2 SERPL-SCNC: 25.2 MMOL/L (ref 22–29)
COHGB MFR BLD: 0.9 % (ref 0–2)
CREAT SERPL-MCNC: 0.72 MG/DL (ref 0.57–1)
CREAT SERPL-MCNC: 0.75 MG/DL (ref 0.57–1)
CRP SERPL-MCNC: 39.5 MG/DL (ref 0–0.5)
D-LACTATE SERPL-SCNC: 2.1 MMOL/L (ref 0.5–2)
D-LACTATE SERPL-SCNC: 3.5 MMOL/L (ref 0.5–2)
D-LACTATE SERPL-SCNC: 3.8 MMOL/L (ref 0.5–2)
D-LACTATE SERPL-SCNC: 4 MMOL/L (ref 0.5–2)
D-LACTATE SERPL-SCNC: 5.2 MMOL/L (ref 0.5–2)
D-LACTATE SERPL-SCNC: 5.8 MMOL/L (ref 0.5–2)
DEPRECATED RDW RBC AUTO: 48.6 FL (ref 37–54)
DEPRECATED RDW RBC AUTO: 50.7 FL (ref 37–54)
DEPRECATED RDW RBC AUTO: 54.1 FL (ref 37–54)
EGFRCR SERPLBLD CKD-EPI 2021: 89.6 ML/MIN/1.73
EGFRCR SERPLBLD CKD-EPI 2021: 94.1 ML/MIN/1.73
EPAP: 0
ERYTHROCYTE [DISTWIDTH] IN BLOOD BY AUTOMATED COUNT: 15.1 % (ref 12.3–15.4)
ERYTHROCYTE [DISTWIDTH] IN BLOOD BY AUTOMATED COUNT: 15.4 % (ref 12.3–15.4)
ERYTHROCYTE [DISTWIDTH] IN BLOOD BY AUTOMATED COUNT: 15.7 % (ref 12.3–15.4)
GLOBULIN UR ELPH-MCNC: 3.3 GM/DL
GLUCOSE SERPL-MCNC: 150 MG/DL (ref 65–99)
GLUCOSE SERPL-MCNC: 335 MG/DL (ref 65–99)
HBA1C MFR BLD: 5.9 % (ref 4.8–5.6)
HCO3 BLDA-SCNC: 17.6 MMOL/L (ref 20–26)
HCT VFR BLD AUTO: 32.6 % (ref 34–46.6)
HCT VFR BLD AUTO: 33.3 % (ref 34–46.6)
HCT VFR BLD AUTO: 34.7 % (ref 34–46.6)
HCT VFR BLD CALC: 33.4 %
HGB BLD-MCNC: 10.8 G/DL (ref 12–15.9)
HGB BLD-MCNC: 10.9 G/DL (ref 12–15.9)
HGB BLD-MCNC: 9.9 G/DL (ref 12–15.9)
HGB BLDA-MCNC: 10.9 G/DL (ref 14–18)
INHALED O2 CONCENTRATION: 28 %
IPAP: 0
LYMPHOCYTES # BLD MANUAL: 0.92 10*3/MM3 (ref 0.7–3.1)
LYMPHOCYTES # BLD MANUAL: 1.87 10*3/MM3 (ref 0.7–3.1)
LYMPHOCYTES NFR BLD MANUAL: 1 % (ref 5–12)
LYMPHOCYTES NFR BLD MANUAL: 1 % (ref 5–12)
MAGNESIUM SERPL-MCNC: 2 MG/DL (ref 1.6–2.4)
MCH RBC QN AUTO: 27.8 PG (ref 26.6–33)
MCH RBC QN AUTO: 28.1 PG (ref 26.6–33)
MCH RBC QN AUTO: 28.1 PG (ref 26.6–33)
MCHC RBC AUTO-ENTMCNC: 30.4 G/DL (ref 31.5–35.7)
MCHC RBC AUTO-ENTMCNC: 31.4 G/DL (ref 31.5–35.7)
MCHC RBC AUTO-ENTMCNC: 32.4 G/DL (ref 31.5–35.7)
MCV RBC AUTO: 86.7 FL (ref 79–97)
MCV RBC AUTO: 88.5 FL (ref 79–97)
MCV RBC AUTO: 92.6 FL (ref 79–97)
METAMYELOCYTES NFR BLD MANUAL: 2 % (ref 0–0)
METHGB BLD QL: 0.5 % (ref 0–1.5)
MODALITY: ABNORMAL
MONOCYTES # BLD: 0.17 10*3/MM3 (ref 0.1–0.9)
MONOCYTES # BLD: 0.18 10*3/MM3 (ref 0.1–0.9)
MRSA DNA SPEC QL NAA+PROBE: NEGATIVE
MYELOCYTES NFR BLD MANUAL: 0 % (ref 0–0)
NEUTROPHILS # BLD AUTO: 14.95 10*3/MM3 (ref 1.7–7)
NEUTROPHILS # BLD AUTO: 16.95 10*3/MM3 (ref 1.7–7)
NEUTROPHILS NFR BLD MANUAL: 72 % (ref 42.7–76)
NEUTROPHILS NFR BLD MANUAL: 80 % (ref 42.7–76)
NEUTS BAND NFR BLD MANUAL: 20 % (ref 0–5)
NEUTS BAND NFR BLD MANUAL: 8 % (ref 0–5)
OXYHGB MFR BLDV: 96.1 % (ref 94–99)
PAW @ PEAK INSP FLOW SETTING VENT: 0 CMH2O
PCO2 BLDA: 28 MM HG (ref 35–45)
PCO2 TEMP ADJ BLD: 28 MM HG (ref 35–45)
PH BLDA: 7.41 PH UNITS (ref 7.35–7.45)
PH, TEMP CORRECTED: 7.41 PH UNITS
PHOSPHATE SERPL-MCNC: 1.6 MG/DL (ref 2.5–4.5)
PHOSPHATE SERPL-MCNC: 2.2 MG/DL (ref 2.5–4.5)
PLAT MORPH BLD: NORMAL
PLAT MORPH BLD: NORMAL
PLATELET # BLD AUTO: 236 10*3/MM3 (ref 140–450)
PLATELET # BLD AUTO: 261 10*3/MM3 (ref 140–450)
PLATELET # BLD AUTO: 280 10*3/MM3 (ref 140–450)
PMV BLD AUTO: 10.2 FL (ref 6–12)
PMV BLD AUTO: 10.3 FL (ref 6–12)
PMV BLD AUTO: 9.9 FL (ref 6–12)
PO2 BLDA: 88.8 MM HG (ref 83–108)
PO2 TEMP ADJ BLD: 88.8 MM HG (ref 83–108)
POTASSIUM SERPL-SCNC: 3.5 MMOL/L (ref 3.5–5.2)
POTASSIUM SERPL-SCNC: 3.8 MMOL/L (ref 3.5–5.2)
PROCALCITONIN SERPL-MCNC: 4.85 NG/ML (ref 0–0.25)
PROT SERPL-MCNC: 6.5 G/DL (ref 6–8.5)
QT INTERVAL: 330 MS
QTC INTERVAL: 408 MS
RBC # BLD AUTO: 3.52 10*6/MM3 (ref 3.77–5.28)
RBC # BLD AUTO: 3.84 10*6/MM3 (ref 3.77–5.28)
RBC # BLD AUTO: 3.92 10*6/MM3 (ref 3.77–5.28)
RBC MORPH BLD: NORMAL
SODIUM SERPL-SCNC: 137 MMOL/L (ref 136–145)
SODIUM SERPL-SCNC: 140 MMOL/L (ref 136–145)
TOTAL RATE: 0 BREATHS/MINUTE
VARIANT LYMPHS NFR BLD MANUAL: 11 % (ref 19.6–45.3)
VARIANT LYMPHS NFR BLD MANUAL: 5 % (ref 19.6–45.3)
WBC MORPH BLD: NORMAL
WBC MORPH BLD: NORMAL
WBC NRBC COR # BLD AUTO: 16.99 10*3/MM3 (ref 3.4–10.8)
WBC NRBC COR # BLD AUTO: 18.05 10*3/MM3 (ref 3.4–10.8)
WBC NRBC COR # BLD AUTO: 18.42 10*3/MM3 (ref 3.4–10.8)

## 2025-01-16 PROCEDURE — 82805 BLOOD GASES W/O2 SATURATION: CPT

## 2025-01-16 PROCEDURE — 94664 DEMO&/EVAL PT USE INHALER: CPT

## 2025-01-16 PROCEDURE — G0378 HOSPITAL OBSERVATION PER HR: HCPCS

## 2025-01-16 PROCEDURE — 96372 THER/PROPH/DIAG INJ SC/IM: CPT

## 2025-01-16 PROCEDURE — 87899 AGENT NOS ASSAY W/OPTIC: CPT | Performed by: PHYSICIAN ASSISTANT

## 2025-01-16 PROCEDURE — 85007 BL SMEAR W/DIFF WBC COUNT: CPT | Performed by: PHYSICIAN ASSISTANT

## 2025-01-16 PROCEDURE — 25010000002 CEFTRIAXONE PER 250 MG: Performed by: PHYSICIAN ASSISTANT

## 2025-01-16 PROCEDURE — 83605 ASSAY OF LACTIC ACID: CPT | Performed by: PHYSICIAN ASSISTANT

## 2025-01-16 PROCEDURE — 96361 HYDRATE IV INFUSION ADD-ON: CPT

## 2025-01-16 PROCEDURE — 84145 PROCALCITONIN (PCT): CPT | Performed by: PHYSICIAN ASSISTANT

## 2025-01-16 PROCEDURE — 87205 SMEAR GRAM STAIN: CPT | Performed by: PHYSICIAN ASSISTANT

## 2025-01-16 PROCEDURE — 25010000002 AZITHROMYCIN PER 500 MG: Performed by: PHYSICIAN ASSISTANT

## 2025-01-16 PROCEDURE — 25010000002 MAGNESIUM SULFATE 2 GM/50ML SOLUTION: Performed by: PHYSICIAN ASSISTANT

## 2025-01-16 PROCEDURE — 83036 HEMOGLOBIN GLYCOSYLATED A1C: CPT | Performed by: NURSE PRACTITIONER

## 2025-01-16 PROCEDURE — 25810000003 SODIUM CHLORIDE 0.9 % SOLUTION: Performed by: PHYSICIAN ASSISTANT

## 2025-01-16 PROCEDURE — 36600 WITHDRAWAL OF ARTERIAL BLOOD: CPT

## 2025-01-16 PROCEDURE — 94799 UNLISTED PULMONARY SVC/PX: CPT

## 2025-01-16 PROCEDURE — 96376 TX/PRO/DX INJ SAME DRUG ADON: CPT

## 2025-01-16 PROCEDURE — 25010000002 METHYLPREDNISOLONE PER 125 MG: Performed by: NURSE PRACTITIONER

## 2025-01-16 PROCEDURE — 71045 X-RAY EXAM CHEST 1 VIEW: CPT

## 2025-01-16 PROCEDURE — 82375 ASSAY CARBOXYHB QUANT: CPT

## 2025-01-16 PROCEDURE — 25810000003 LACTATED RINGERS SOLUTION: Performed by: PHYSICIAN ASSISTANT

## 2025-01-16 PROCEDURE — 85027 COMPLETE CBC AUTOMATED: CPT | Performed by: PHYSICIAN ASSISTANT

## 2025-01-16 PROCEDURE — 25810000003 SODIUM CHLORIDE 0.9 % SOLUTION 250 ML FLEX CONT: Performed by: PHYSICIAN ASSISTANT

## 2025-01-16 PROCEDURE — 73701 CT LOWER EXTREMITY W/DYE: CPT

## 2025-01-16 PROCEDURE — 87070 CULTURE OTHR SPECIMN AEROBIC: CPT | Performed by: PHYSICIAN ASSISTANT

## 2025-01-16 PROCEDURE — 87641 MR-STAPH DNA AMP PROBE: CPT | Performed by: PHYSICIAN ASSISTANT

## 2025-01-16 PROCEDURE — 99223 1ST HOSP IP/OBS HIGH 75: CPT | Performed by: NURSE PRACTITIONER

## 2025-01-16 PROCEDURE — 83735 ASSAY OF MAGNESIUM: CPT | Performed by: PHYSICIAN ASSISTANT

## 2025-01-16 PROCEDURE — 25810000003 SODIUM CHLORIDE 0.9 % SOLUTION: Performed by: NURSE PRACTITIONER

## 2025-01-16 PROCEDURE — 83605 ASSAY OF LACTIC ACID: CPT | Performed by: NURSE PRACTITIONER

## 2025-01-16 PROCEDURE — 96375 TX/PRO/DX INJ NEW DRUG ADDON: CPT

## 2025-01-16 PROCEDURE — 84100 ASSAY OF PHOSPHORUS: CPT | Performed by: PHYSICIAN ASSISTANT

## 2025-01-16 PROCEDURE — 87077 CULTURE AEROBIC IDENTIFY: CPT | Performed by: PHYSICIAN ASSISTANT

## 2025-01-16 PROCEDURE — 87186 SC STD MICRODIL/AGAR DIL: CPT | Performed by: PHYSICIAN ASSISTANT

## 2025-01-16 PROCEDURE — 25010000002 DEXAMETHASONE PER 1 MG

## 2025-01-16 PROCEDURE — 86140 C-REACTIVE PROTEIN: CPT | Performed by: PHYSICIAN ASSISTANT

## 2025-01-16 PROCEDURE — 83050 HGB METHEMOGLOBIN QUAN: CPT

## 2025-01-16 PROCEDURE — 87449 NOS EACH ORGANISM AG IA: CPT | Performed by: PHYSICIAN ASSISTANT

## 2025-01-16 PROCEDURE — 80053 COMPREHEN METABOLIC PANEL: CPT | Performed by: PHYSICIAN ASSISTANT

## 2025-01-16 PROCEDURE — 25010000002 HEPARIN (PORCINE) PER 1000 UNITS: Performed by: NURSE PRACTITIONER

## 2025-01-16 RX ORDER — ACETAMINOPHEN 160 MG/5ML
650 SOLUTION ORAL EVERY 4 HOURS PRN
Status: DISCONTINUED | OUTPATIENT
Start: 2025-01-16 | End: 2025-01-19 | Stop reason: HOSPADM

## 2025-01-16 RX ORDER — MAGNESIUM SULFATE HEPTAHYDRATE 40 MG/ML
2 INJECTION, SOLUTION INTRAVENOUS ONCE
Status: COMPLETED | OUTPATIENT
Start: 2025-01-16 | End: 2025-01-16

## 2025-01-16 RX ORDER — SODIUM CHLORIDE FOR INHALATION 7 %
4 VIAL, NEBULIZER (ML) INHALATION ONCE
Status: COMPLETED | OUTPATIENT
Start: 2025-01-16 | End: 2025-01-16

## 2025-01-16 RX ORDER — ALBUTEROL SULFATE 0.83 MG/ML
2.5 SOLUTION RESPIRATORY (INHALATION)
Status: DISCONTINUED | OUTPATIENT
Start: 2025-01-16 | End: 2025-01-17

## 2025-01-16 RX ORDER — AMOXICILLIN 250 MG
2 CAPSULE ORAL 2 TIMES DAILY PRN
Status: DISCONTINUED | OUTPATIENT
Start: 2025-01-16 | End: 2025-01-19 | Stop reason: HOSPADM

## 2025-01-16 RX ORDER — GUAIFENESIN 600 MG/1
1200 TABLET, EXTENDED RELEASE ORAL EVERY 12 HOURS SCHEDULED
Status: DISCONTINUED | OUTPATIENT
Start: 2025-01-16 | End: 2025-01-19 | Stop reason: HOSPADM

## 2025-01-16 RX ORDER — SODIUM CHLORIDE 0.9 % (FLUSH) 0.9 %
10 SYRINGE (ML) INJECTION AS NEEDED
Status: DISCONTINUED | OUTPATIENT
Start: 2025-01-16 | End: 2025-01-19 | Stop reason: HOSPADM

## 2025-01-16 RX ORDER — BISACODYL 5 MG/1
5 TABLET, DELAYED RELEASE ORAL DAILY PRN
Status: DISCONTINUED | OUTPATIENT
Start: 2025-01-16 | End: 2025-01-19 | Stop reason: HOSPADM

## 2025-01-16 RX ORDER — METHYLPREDNISOLONE SODIUM SUCCINATE 125 MG/2ML
60 INJECTION, POWDER, LYOPHILIZED, FOR SOLUTION INTRAMUSCULAR; INTRAVENOUS EVERY 12 HOURS
Status: DISCONTINUED | OUTPATIENT
Start: 2025-01-17 | End: 2025-01-18

## 2025-01-16 RX ORDER — SODIUM CHLORIDE 9 MG/ML
75 INJECTION, SOLUTION INTRAVENOUS CONTINUOUS
Status: CANCELLED | OUTPATIENT
Start: 2025-01-16 | End: 2025-01-16

## 2025-01-16 RX ORDER — SODIUM CHLORIDE 0.9 % (FLUSH) 0.9 %
10 SYRINGE (ML) INJECTION EVERY 12 HOURS SCHEDULED
Status: DISCONTINUED | OUTPATIENT
Start: 2025-01-16 | End: 2025-01-19 | Stop reason: HOSPADM

## 2025-01-16 RX ORDER — ALBUTEROL SULFATE 0.83 MG/ML
SOLUTION RESPIRATORY (INHALATION)
Status: COMPLETED
Start: 2025-01-16 | End: 2025-01-16

## 2025-01-16 RX ORDER — SODIUM CHLORIDE 9 MG/ML
100 INJECTION, SOLUTION INTRAVENOUS CONTINUOUS
Status: ACTIVE | OUTPATIENT
Start: 2025-01-16 | End: 2025-01-17

## 2025-01-16 RX ORDER — POTASSIUM CHLORIDE 1500 MG/1
40 TABLET, EXTENDED RELEASE ORAL EVERY 4 HOURS
Status: COMPLETED | OUTPATIENT
Start: 2025-01-16 | End: 2025-01-16

## 2025-01-16 RX ORDER — SODIUM CHLORIDE 9 MG/ML
40 INJECTION, SOLUTION INTRAVENOUS AS NEEDED
Status: DISCONTINUED | OUTPATIENT
Start: 2025-01-16 | End: 2025-01-19 | Stop reason: HOSPADM

## 2025-01-16 RX ORDER — BISACODYL 10 MG
10 SUPPOSITORY, RECTAL RECTAL DAILY PRN
Status: DISCONTINUED | OUTPATIENT
Start: 2025-01-16 | End: 2025-01-19 | Stop reason: HOSPADM

## 2025-01-16 RX ORDER — HEPARIN SODIUM 5000 [USP'U]/ML
5000 INJECTION, SOLUTION INTRAVENOUS; SUBCUTANEOUS EVERY 8 HOURS SCHEDULED
Status: DISCONTINUED | OUTPATIENT
Start: 2025-01-16 | End: 2025-01-19 | Stop reason: HOSPADM

## 2025-01-16 RX ORDER — ACETAMINOPHEN 650 MG/1
650 SUPPOSITORY RECTAL EVERY 4 HOURS PRN
Status: DISCONTINUED | OUTPATIENT
Start: 2025-01-16 | End: 2025-01-19 | Stop reason: HOSPADM

## 2025-01-16 RX ORDER — POLYETHYLENE GLYCOL 3350 17 G/17G
17 POWDER, FOR SOLUTION ORAL DAILY PRN
Status: DISCONTINUED | OUTPATIENT
Start: 2025-01-16 | End: 2025-01-19 | Stop reason: HOSPADM

## 2025-01-16 RX ORDER — ACETAMINOPHEN 325 MG/1
650 TABLET ORAL EVERY 4 HOURS PRN
Status: DISCONTINUED | OUTPATIENT
Start: 2025-01-16 | End: 2025-01-19 | Stop reason: HOSPADM

## 2025-01-16 RX ORDER — IPRATROPIUM BROMIDE AND ALBUTEROL SULFATE 2.5; .5 MG/3ML; MG/3ML
3 SOLUTION RESPIRATORY (INHALATION)
Status: DISCONTINUED | OUTPATIENT
Start: 2025-01-16 | End: 2025-01-16

## 2025-01-16 RX ADMIN — POTASSIUM CHLORIDE 40 MEQ: 1500 TABLET, EXTENDED RELEASE ORAL at 23:45

## 2025-01-16 RX ADMIN — PANTOPRAZOLE SODIUM 40 MG: 40 TABLET, DELAYED RELEASE ORAL at 05:46

## 2025-01-16 RX ADMIN — Medication 10 ML: at 20:41

## 2025-01-16 RX ADMIN — OSELTAMIVIR PHOSPHATE 75 MG: 75 CAPSULE ORAL at 20:39

## 2025-01-16 RX ADMIN — HEPARIN SODIUM 5000 UNITS: 5000 INJECTION INTRAVENOUS; SUBCUTANEOUS at 21:49

## 2025-01-16 RX ADMIN — POTASSIUM CHLORIDE 40 MEQ: 1500 TABLET, EXTENDED RELEASE ORAL at 20:39

## 2025-01-16 RX ADMIN — GUAIFENESIN 1200 MG: 600 TABLET, EXTENDED RELEASE ORAL at 20:39

## 2025-01-16 RX ADMIN — IPRATROPIUM BROMIDE AND ALBUTEROL SULFATE 3 ML: 2.5; .5 SOLUTION RESPIRATORY (INHALATION) at 12:55

## 2025-01-16 RX ADMIN — SODIUM CHLORIDE 500 ML: 9 INJECTION, SOLUTION INTRAVENOUS at 00:20

## 2025-01-16 RX ADMIN — SODIUM CHLORIDE 125 ML/HR: 9 INJECTION, SOLUTION INTRAVENOUS at 02:40

## 2025-01-16 RX ADMIN — AZITHROMYCIN DIHYDRATE 500 MG: 500 INJECTION, POWDER, LYOPHILIZED, FOR SOLUTION INTRAVENOUS at 09:20

## 2025-01-16 RX ADMIN — FAMOTIDINE 40 MG: 20 TABLET, FILM COATED ORAL at 08:31

## 2025-01-16 RX ADMIN — OSELTAMIVIR PHOSPHATE 75 MG: 75 CAPSULE ORAL at 08:31

## 2025-01-16 RX ADMIN — MAGNESIUM SULFATE HEPTAHYDRATE 2 G: 40 INJECTION, SOLUTION INTRAVENOUS at 11:41

## 2025-01-16 RX ADMIN — SODIUM CHLORIDE 125 ML/HR: 9 INJECTION, SOLUTION INTRAVENOUS at 08:43

## 2025-01-16 RX ADMIN — METHYLPREDNISOLONE SODIUM SUCCINATE 60 MG: 125 INJECTION INTRAMUSCULAR; INTRAVENOUS at 23:46

## 2025-01-16 RX ADMIN — LOSARTAN POTASSIUM 100 MG: 50 TABLET, FILM COATED ORAL at 08:31

## 2025-01-16 RX ADMIN — POTASSIUM & SODIUM PHOSPHATES POWDER PACK 280-160-250 MG 2 PACKET: 280-160-250 PACK at 14:33

## 2025-01-16 RX ADMIN — POTASSIUM & SODIUM PHOSPHATES POWDER PACK 280-160-250 MG 2 PACKET: 280-160-250 PACK at 09:22

## 2025-01-16 RX ADMIN — IPRATROPIUM BROMIDE AND ALBUTEROL SULFATE 3 ML: 2.5; .5 SOLUTION RESPIRATORY (INHALATION) at 07:21

## 2025-01-16 RX ADMIN — ALBUTEROL SULFATE 2.5 MG: 2.5 SOLUTION RESPIRATORY (INHALATION) at 10:28

## 2025-01-16 RX ADMIN — GUAIFENESIN 1200 MG: 600 TABLET, EXTENDED RELEASE ORAL at 08:31

## 2025-01-16 RX ADMIN — DEXAMETHASONE SODIUM PHOSPHATE 10 MG: 10 INJECTION INTRAMUSCULAR; INTRAVENOUS at 08:32

## 2025-01-16 RX ADMIN — SODIUM CHLORIDE 100 ML/HR: 9 INJECTION, SOLUTION INTRAVENOUS at 18:27

## 2025-01-16 RX ADMIN — CEFTRIAXONE 1 G: 1 INJECTION, POWDER, FOR SOLUTION INTRAMUSCULAR; INTRAVENOUS at 08:33

## 2025-01-16 RX ADMIN — SODIUM CHLORIDE, POTASSIUM CHLORIDE, SODIUM LACTATE AND CALCIUM CHLORIDE 500 ML: 600; 310; 30; 20 INJECTION, SOLUTION INTRAVENOUS at 14:39

## 2025-01-16 RX ADMIN — IPRATROPIUM BROMIDE AND ALBUTEROL SULFATE 3 ML: 2.5; .5 SOLUTION RESPIRATORY (INHALATION) at 21:09

## 2025-01-16 RX ADMIN — ALBUTEROL SULFATE 2.5 MG: 2.5 SOLUTION RESPIRATORY (INHALATION) at 14:09

## 2025-01-16 RX ADMIN — AMLODIPINE BESYLATE 5 MG: 5 TABLET ORAL at 08:31

## 2025-01-16 RX ADMIN — SODIUM CHLORIDE 4 ML: 7 NEBU SOLN,3 % NEBU at 08:19

## 2025-01-16 NOTE — H&P
[facility]   HISTORY AND PHYSICAL    Patient Name: Carlota Santana  : 1960  MRN: [id number]  Primary Care Physician:  Rehan Ramirez APRN  Date of admission: 1/15/2025    Subjective   Subjective     Chief Complaint:     Weakness - Generalized  Symptoms include cough, fatigue and a fever.      Patient is a 64-year-old female with a past medical history of hypertension who presented to emergency room complaining of respiratory symptoms such as cough, congestion, fatigue.  She also reports she had some shortness of breath and dyspnea.  Reports that symptoms started 24 hours ago and have worsened over the last several hours.  Reports that she took her oxygen at home via her own pulse oximeter and it was in the 70s, due to that they decided to come to the emergency department.  Patient has never had to wear oxygen at home and she does not have a history of COPD, pulmonary or cardiac disease.  Patient does report that she has had close contacts of other family members with viral illness symptoms.    Patient was evaluated in the emergency department with laboratory evaluation and imaging.  It was found that patient has influenza A.  Patient's chest x-ray that showed pneumonia.  A CT scan was performed as patient's dimer returned at 1.69.  CT scan returned with bilateral pneumonia and pulmonary hypertension.  Patient had several other laboratory values that were abnormal such as her lactic acid returned at 2.9 initially and 3.3 on the repeat.  Her creatinine was elevated to 1.05 with her baseline being around 0.8.  White blood cell count returned at 17.26.  Patient was febrile and tachycardic with an elevated white count in the emergency department.  At this flagged her for sepsis.  30 mL/kg of lactated Ringer's was given to her ideal body weight.  Patient was started on Rocephin and Zithromax for pneumonia along with Tamiflu for influenza A.  Patient also received Decadron, Lasix, DuoNeb. Patient's oxygen  saturation remained stable and above 92% while in the emergency department on room air and patient did not require oxygen.    Due to patient's significant findings in the emergency department the decision was made to place patient in the CDU for observation and placed her on the wait list for [facility] due to possible sepsis due to pneumonia.  Patient's vital signs remained stable, after the fluid bolus patient's blood pressure did remain stable and patient did not require a pressor.  Patient does report that she feels better after the medications given in the emergency department.  Patient is Maori-speaking and her family official  are used to discuss information with patient.  Patient denies a history of MI, COPD, lung disease, heart disease, stroke.  Patient denies chest pain, headache, neck pain clinic symptoms, numbness, tingling, loss sensation, difficulty walking, abdominal pain.      Review of Systems   Constitutional:  Positive for fatigue and fever.   Respiratory:  Positive for cough and wheezing.         Personal History     Past Medical History:   Diagnosis Date   • Hypertension        Past Surgical History:   Procedure Laterality Date   •  SECTION  1989   • GASTRIC BYPASS         Family History: family history includes Cancer in her father; Mental illness in her mother. Otherwise pertinent FHx was reviewed and not pertinent to current issue.    Social History:  reports that she has never smoked. She has never been exposed to tobacco smoke. She has never used smokeless tobacco. She reports current alcohol use of about 3.0 standard drinks of alcohol per week. She reports that she does not use drugs.    Home Medications:  Buffered Vitamin C, Magnesium, Multiple Vitamins-Minerals, Vitamin D3, amLODIPine, fluticasone, losartan, meloxicam, multivitamin, omeprazole, sodium-potassium-magnesium sulfates, and vitamin E    Allergies:  No Known Allergies    Objective    Objective      Vitals:   Temp:  [98.5 °F (36.9 °C)-98.8 °F (37.1 °C)] 98.5 °F (36.9 °C)  Heart Rate:  [83-94] 88  Resp:  [14-18] 16  BP: (103-138)/(55-72) 112/55    Physical Exam  Vitals and nursing note reviewed.   Constitutional:       Appearance: Normal appearance. She is normal weight.   HENT:      Head: Normocephalic.      Nose: Congestion and rhinorrhea present.   Eyes:      Pupils: Pupils are equal, round, and reactive to light.   Cardiovascular:      Rate and Rhythm: Normal rate and regular rhythm.   Pulmonary:      Effort: Pulmonary effort is normal. No respiratory distress.      Breath sounds: No stridor. Wheezing present.   Abdominal:      General: Abdomen is flat.      Tenderness: There is no abdominal tenderness.   Musculoskeletal:         General: Normal range of motion.   Skin:     General: Skin is warm and dry.   Neurological:      General: No focal deficit present.      Mental Status: She is alert and oriented to person, place, and time. Mental status is at baseline.   Psychiatric:         Mood and Affect: Mood normal.         Behavior: Behavior normal.         Thought Content: Thought content normal.         Judgment: Judgment normal.         Result Review    Result Review:  I have personally reviewed the results from the time of this admission to 1/15/2025 23:21 EST and agree with these findings:  [x]  Laboratory list / accordion  [x]  Microbiology  [x]  Radiology  [x]  EKG/Telemetry   []  Cardiology/Vascular   []  Pathology  [x]  Old records  []  Other:  Most notable findings include: White blood cell count is 17, lactic of 2.9 and 3.3; Creatining of 1.05. Dimer 1.69. CT scan showing bilateral pneumonia and pulmonary HTN. Influenza positive.     WBC   Date Value Ref Range Status   01/15/2025 17.26 (H) 3.40 - 10.80 10*3/mm3 Final     RBC   Date Value Ref Range Status   01/15/2025 4.19 3.77 - 5.28 10*6/mm3 Final     Hemoglobin   Date Value Ref Range Status   01/15/2025 11.8 (L) 12.0 - 15.9 g/dL Final      Hematocrit   Date Value Ref Range Status   01/15/2025 36.4 34.0 - 46.6 % Final     MCV   Date Value Ref Range Status   01/15/2025 86.9 79.0 - 97.0 fL Final     MCH   Date Value Ref Range Status   01/15/2025 28.2 26.6 - 33.0 pg Final     MCHC   Date Value Ref Range Status   01/15/2025 32.4 31.5 - 35.7 g/dL Final     RDW   Date Value Ref Range Status   01/15/2025 15.1 12.3 - 15.4 % Final     RDW-SD   Date Value Ref Range Status   01/15/2025 48.7 37.0 - 54.0 fl Final     MPV   Date Value Ref Range Status   01/15/2025 9.8 6.0 - 12.0 fL Final     Platelets   Date Value Ref Range Status   01/15/2025 283 140 - 450 10*3/mm3 Final     Neutrophils Absolute   Date Value Ref Range Status   01/15/2025 14.50 (H) 1.70 - 7.00 10*3/mm3 Final     Lab Results   Component Value Date    GLUCOSE 161 (H) 01/15/2025    BUN 24 (H) 01/15/2025    CREATININE 1.05 (H) 01/15/2025     (L) 01/15/2025    K 4.2 01/15/2025    CL 97 (L) 01/15/2025    CALCIUM 9.0 01/15/2025    PROTEINTOT 7.5 01/15/2025    ALBUMIN 3.8 01/15/2025    ALT 13 01/15/2025    AST 43 (H) 01/15/2025    ALKPHOS 109 01/15/2025    BILITOT 0.5 01/15/2025    GLOB 3.7 01/15/2025    AGRATIO 1.0 01/15/2025    BCR 22.9 01/15/2025    ANIONGAP 14.8 01/15/2025    EGFR 59.8 (L) 01/15/2025     CT Angiogram Chest Pulmonary Embolism    Result Date: 1/15/2025  Impression: 1.Significantly limited examination due to respiratory motion artifact. 2.No evidence of central occlusive pulmonary embolus given these limitations. 3.Multifocal consolidative opacities predominantly in the left lower lobe and right middle lobe. This is most consistent with multifocal pneumonia. 4.Borderline enlarged main pulmonary artery which can be seen in the setting of pulmonary hypertension. Electronically Signed: Landen Ross MD  1/15/2025 7:08 PM EST  Workstation ID: [id number]    XR Chest 1 View    Result Date: 1/15/2025  Impression: Perihilar opacities which may reflect infection or edema. Electronically  Signed: Landen Ross MD  1/15/2025 5:37 PM EST  Workstation ID: [id number]       Lab 01/15/25  1946 01/15/25  1643   LACTATE 3.3* 2.9*     Procalitonin Results:      Lab 01/15/25  1643   PROCALCITONIN 6.09*     CARDIAC LABS:      Lab 01/15/25  1807 01/15/25  1643   PROBNP  --  1,527.0*   HSTROP T 8 11         Assessment & Plan   Assessment / Plan     Brief Patient Summary:  Carlota Santana is a 64 y.o. female who was admitted in the emergency department diagnosed with bilateral pneumonia and influenza.  Patient is not oxygen requiring.  Patient's lactic, procalcitonin, white blood cell count were elevated and patient was placed on a wait list to [facility] for inpatient hospitalization.  Patient was placed in the CDU for observation status to receive antibiotics, breathing treatments, steroids, other medications.  Patient's vital signs have remained stable, her blood pressure has remained stable and she is not requiring a pressor.  Patient was placed on maintenance fluids.  Patient is not currently having any complaints and is able to eat and drink without any difficulty.  Patient's oxygen on room air is currently 95%.    Active Hospital Problems:  Active Hospital Problems    Diagnosis    • **Pneumonia    • Influenza A      Plan:   #Pneumonia  - continue IV rocephin and zithromax  - continue PRN nebulizer treatments  - continue steroids  - continuous monitoring oxygen saturation  - repeat morning labs to monitor lactic acid, procalcitonin, WBC count.  - repeat xray in the a.m. to monitor pneumonia    # Influenza:   - continue tamiflu  - continuous pulse ox monitoring    # SERS criteria positive:   - 30mL/kg of LR given in ED and 125 mL/hr of normal saline currently  - monitor vitals and labs for deteriorating condition   - continue to remain on wait list at [facility]     # HTN:   - continue home medications as long as blood pressure remains stable    VTE Prophylaxis:  Mechanical VTE prophylaxis orders are  present.        CODE STATUS:    Level Of Support Discussed With: Patient  Code Status (Patient has no pulse and is not breathing): CPR (Attempt to Resuscitate)  Medical Interventions (Patient has pulse or is breathing): Full Support    Admission Status:  I believe this patient meets observation in ED  status while boarding for a bed at [facility].     Lizzette De Paz PA-C  23:21 EST  01/15/2025

## 2025-01-16 NOTE — ED NOTES
Carlota Santana    Nursing Report ED to Floor:  Mental status: a/o x4  Ambulatory status: assist x1  Oxygen Therapy:  RA  Cardiac Rhythm: NSR  Admitted from: home  Safety Concerns:  falls  Social Issues: none  ED Room #:  17    ED Nurse Phone Extension - [phone] or may call [phone].      HPI:   Chief Complaint   Patient presents with    Weakness - Generalized       Past Medical History:  Past Medical History:   Diagnosis Date    Hypertension         Past Surgical History:  Past Surgical History:   Procedure Laterality Date     SECTION  1989    GASTRIC BYPASS          Admitting Doctor:   No admitting provider for patient encounter.    Consulting Provider(s):  Consults       No orders found from 2024 to 2025.             Admitting Diagnosis:   The primary encounter diagnosis was Multifocal pneumonia. Diagnoses of Influenza A, Sepsis without acute organ dysfunction, due to unspecified organism, and Pulmonary hypertension were also pertinent to this visit.    Most Recent Vitals:   Vitals:    01/15/25 1730 01/15/25 1800 01/15/25 1930 01/15/25 2012   BP: 115/58 121/60 116/72    BP Location:       Patient Position:       Pulse: 83 83 94 89   Resp:       Temp:       TempSrc:       SpO2: 95% 94% 94%    Weight:       Height:           Active LDAs/IV Access:   Lines, Drains & Airways       Active LDAs       Name Placement date Placement time Site Days    Peripheral IV 01/15/25 1827 Right Antecubital 01/15/25  1827  Antecubital  less than 1                    Labs (abnormal labs have a star):   Labs Reviewed   RESPIRATORY PANEL PCR W/ COVID-19 (SARS-COV-2), NP SWAB IN UTM/VTP, 2 HR TAT - Abnormal; Notable for the following components:       Result Value    Influenza A H3 Detected (*)     All other components within normal limits    Narrative:     In the setting of a positive respiratory panel with a viral infection PLUS a negative procalcitonin without other underlying concern for bacterial infection,  consider observing off antibiotics or discontinuation of antibiotics and continue supportive care. If the respiratory panel is positive for atypical bacterial infection (Bordetella pertussis, Chlamydophila pneumoniae, or Mycoplasma pneumoniae), consider antibiotic de-escalation to target atypical bacterial infection.   COMPREHENSIVE METABOLIC PANEL - Abnormal; Notable for the following components:    Glucose 161 (*)     BUN 24 (*)     Creatinine 1.05 (*)     Sodium 134 (*)     Chloride 97 (*)     AST (SGOT) 43 (*)     eGFR 59.8 (*)     All other components within normal limits    Narrative:     GFR Categories in Chronic Kidney Disease (CKD)      GFR Category          GFR (mL/min/1.73)    Interpretation  G1                     90 or greater         Normal or high (1)  G2                      60-89                Mild decrease (1)  G3a                   45-59                Mild to moderate decrease  G3b                   30-44                Moderate to severe decrease  G4                    15-29                Severe decrease  G5                    14 or less           Kidney failure          (1)In the absence of evidence of kidney disease, neither GFR category G1 or G2 fulfill the criteria for CKD.    eGFR calculation 2021 CKD-EPI creatinine equation, which does not include race as a factor   CBC WITH AUTO DIFFERENTIAL - Abnormal; Notable for the following components:    WBC 17.26 (*)     Hemoglobin 11.8 (*)     All other components within normal limits   BNP (IN-HOUSE) - Abnormal; Notable for the following components:    proBNP 1,527.0 (*)     All other components within normal limits    Narrative:     This assay is used as an aid in the diagnosis of individuals suspected of having heart failure. It can be used as an aid in the diagnosis of acute decompensated heart failure (ADHF) in patients presenting with signs and symptoms of ADHF to the emergency department (ED). In addition, NT-proBNP of <300 pg/mL  "indicates ADHF is not likely.    Age Range Result Interpretation  NT-proBNP Concentration (pg/mL:      <50             Positive            >450                   Gray                 300-450                    Negative             <300    50-75           Positive            >900                  Gray                300-900                  Negative            <300      >75             Positive            >1800                  Gray                300-1800                  Negative            <300   D-DIMER, QUANTITATIVE - Abnormal; Notable for the following components:    D-Dimer, Quantitative 1.69 (*)     All other components within normal limits    Narrative:     According to the assay 's published package insert, a normal (<0.50 MCGFEU/mL) D-dimer result in conjunction with a non-high clinical probability assessment, excludes deep vein thrombosis (DVT) and pulmonary embolism (PE) with high sensitivity.    D-dimer values increase with age and this can make VTE exclusion of an older population difficult. To address this, the [employer], based on best available evidence and recent guidelines, recommends that clinicians use age-adjusted D-dimer thresholds in patients greater than 50 years of age with: a) a low probability of PE who do not meet all Pulmonary Embolism Rule Out Criteria, or b) in those with intermediate probability of PE.   The formula for an age-adjusted D-dimer cut-off is \"age/100\".  For example, a 60 year old patient would have an age-adjusted cut-off of 0.60 MCGFEU/mL and an 80 year old 0.80 MCGFEU/mL.   MANUAL DIFFERENTIAL - Abnormal; Notable for the following components:    Lymphocyte % 5.0 (*)     Monocyte % 4.0 (*)     Bands %  35.0 (*)     Metamyelocyte % 7.0 (*)     Neutrophils Absolute 14.50 (*)     All other components within normal limits   LACTIC ACID, PLASMA - Abnormal; Notable for the following components:    Lactate 2.9 (*)     All other components within " normal limits   PROCALCITONIN - Abnormal; Notable for the following components:    Procalcitonin 6.09 (*)     All other components within normal limits   LACTIC ACID, REFLEX - Abnormal; Notable for the following components:    Lactate 3.3 (*)     All other components within normal limits   TROPONIN - Normal   MAGNESIUM - Normal   URINALYSIS W/ MICROSCOPIC IF INDICATED (NO CULTURE) - Normal    Narrative:     Urine microscopic not indicated.   HIGH SENSITIVITIY TROPONIN T 1HR - Normal   COVID PRE-OP / PRE-PROCEDURE SCREENING ORDER (NO ISOLATION)    Narrative:     The following orders were created for panel order COVID PRE-OP / PRE-PROCEDURE SCREENING ORDER (NO ISOLATION) - Swab, Nasopharynx.  Procedure                               Abnormality         Status                     ---------                               -----------         ------                     Respiratory Panel PCR w/...[[id number]]  Abnormal            Final result                 Please view results for these tests on the individual orders.   BLOOD CULTURE   BLOOD CULTURE   RAINBOW DRAW    Narrative:     The following orders were created for panel order Juliette Draw.  Procedure                               Abnormality         Status                     ---------                               -----------         ------                     Green Top (Gel)[[id number]]                                  Final result               Lavender Top[[id number]]                                     Final result               Gold Top - SST[[id number]]                                   Final result               Duran Top[[id number]]                                         Final result               Light Blue Top[[id number]]                                   Final result                 Please view results for these tests on the individual orders.   LACTIC ACID, REFLEX   POCT GLUCOSE FINGERSTICK   CBC AND DIFFERENTIAL    Narrative:     The following orders were  created for panel order CBC & Differential.  Procedure                               Abnormality         Status                     ---------                               -----------         ------                     CBC Auto Differential[[id number]]        Abnormal            Final result               Scan Slide[[id number]]                                                                    Please view results for these tests on the individual orders.   GREEN TOP   LAVENDER TOP   GOLD TOP - SST   GRAY TOP   LIGHT BLUE TOP       Meds Given in ED:   Medications   sodium chloride 0.9 % flush 10 mL (has no administration in time range)   lactated ringers bolus 560 mL (560 mL Intravenous New Bag 1/15/25 1947)   ipratropium-albuterol (DUO-NEB) nebulizer solution 3 mL (3 mL Nebulization Given 1/15/25 1700)   dexAMETHasone (DECADRON) injection 10 mg (10 mg Intravenous Given 1/15/25 1656)   furosemide (LASIX) injection 40 mg (40 mg Intravenous Given 1/15/25 1810)   cefTRIAXone (ROCEPHIN) 2,000 mg in sodium chloride 0.9 % 100 mL MBP (0 mg Intravenous Stopped 1/15/25 1916)   azithromycin (ZITHROMAX) tablet 500 mg (500 mg Oral Given 1/15/25 1810)   lactated ringers bolus 500 mL (0 mL Intravenous Stopped 1/15/25 1952)   iopamidol (ISOVUE-370) 76 % injection 100 mL (75 mL Intravenous Given 1/15/25 1816)   lactated ringers bolus 500 mL (500 mL Intravenous New Bag 1/15/25 1914)   oseltamivir (TAMIFLU) capsule 75 mg (75 mg Oral Given 1/15/25 1943)           Last NIH score:                                                          Dysphagia screening results:        Soren Coma Scale:  No data recorded     CIWA:        Restraint Type:            Isolation Status:  Droplet

## 2025-01-16 NOTE — PLAN OF CARE
Pt on 2LNC, coughing with exertion but nonproductive, VSS, afebrile, tachycardic at times in low 100's, up with standby assist. Sepsis protocol initiated. Pt [place] speaking,  at bedside,  tablet in room if needed. Pt in bed resting comfortably at this time, call light in reach.     Problem: Adult Inpatient Plan of Care  Goal: Plan of Care Review  Outcome: Progressing  Goal: Patient-Specific Goal (Individualized)  Description: Use  as needed during hospitalization  Outcome: Progressing  Goal: Absence of Hospital-Acquired Illness or Injury  Outcome: Progressing  Intervention: Identify and Manage Fall Risk  Description: Perform standard risk assessment on admission using a validated tool or comprehensive approach appropriate to the patient; reassess fall risk frequently, with change in status or transfer to another level of care.  Communicate risk to interprofessional healthcare team; ensure fall risk visible cue.  Determine need for increased observation, equipment and environmental modification, as well as use of supportive, nonskid footwear.  Adjust safety measures to individual needs and identified risk factors.  Reinforce the importance of active participation with fall risk prevention, safety, and physical activity with the patient and family.  Perform regular intentional rounding to assess need for position change, pain assessment and personal needs, including assistance with toileting.  Recent Flowsheet Documentation  Taken 1/16/2025 1800 by Arelis Ambrosio, RN  Safety Promotion/Fall Prevention:   activity supervised   assistive device/personal items within reach   clutter free environment maintained   fall prevention program maintained   nonskid shoes/slippers when out of bed   room organization consistent   safety round/check completed  Taken 1/16/2025 1626 by Arelis Ambrosio, RN  Safety Promotion/Fall Prevention:   activity supervised   assistive device/personal items within  reach   clutter free environment maintained   fall prevention program maintained   nonskid shoes/slippers when out of bed   room organization consistent   safety round/check completed  Intervention: Prevent Skin Injury  Description: Perform a screening for skin injury risk, such as pressure or moisture-associated skin damage on admission and at regular intervals throughout hospital stay.  Keep all areas of skin (especially folds) clean and dry.  Maintain adequate skin hydration.  Relieve and redistribute pressure and protect bony prominences and skin at risk for injury; implement measures based on patient-specific risk factors.  Match turning and repositioning schedule to clinical condition.  Encourage weight shift frequently; assist with reposition if unable to complete independently.  Float heels off bed; avoid pressure on the Achilles tendon.  Keep skin free from extended contact with medical devices.  Optimize nutrition and hydration.  Encourage functional activity and mobility, as early as tolerated.  Use aids (e.g., slide boards, mechanical lift) during transfer.  Recent Flowsheet Documentation  Taken 1/16/2025 1800 by Arelis Ambrosio, RN  Body Position: position changed independently  Skin Protection:   incontinence pads utilized   protective footwear used   transparent dressing maintained  Taken 1/16/2025 1626 by Arelis Ambrosio, RN  Body Position: position changed independently  Skin Protection:   incontinence pads utilized   protective footwear used   transparent dressing maintained  Intervention: Prevent and Manage VTE (Venous Thromboembolism) Risk  Description: Assess for VTE (venous thromboembolism) risk.  Promote early mobilization; encourage both active and passive leg exercises, if unable to ambulate.  Initiate and maintain compression or other therapy, as indicated, based on identified risk in accordance with organizational protocol and provider order.  Recognize the patient's individual risk for  bleeding before initiating pharmacologic thromboprophylaxis.  Recent Flowsheet Documentation  Taken 1/16/2025 1800 by Arelis Ambrosio RN  VTE Prevention/Management: patient refused intervention  Taken 1/16/2025 1626 by Arelis Ambrosio RN  VTE Prevention/Management: patient refused intervention  Intervention: Prevent Infection  Description: Maintain skin and mucous membrane integrity; promote hand, oral and pulmonary hygiene.  Optimize fluid balance, nutrition, sleep and glycemic control to maximize infection resistance.  Identify potential sources of infection early to prevent or mitigate progression of infection (e.g., wound, lines, devices).  Evaluate ongoing need for invasive devices; remove promptly when no longer indicated.  Review vaccination status.  Recent Flowsheet Documentation  Taken 1/16/2025 1800 by Arelis Ambrosio RN  Infection Prevention:   environmental surveillance performed   hand hygiene promoted   rest/sleep promoted   single patient room provided  Taken 1/16/2025 1626 by Arelis Ambrosio RN  Infection Prevention:   environmental surveillance performed   hand hygiene promoted   single patient room provided   rest/sleep promoted  Goal: Optimal Comfort and Wellbeing  Outcome: Progressing  Intervention: Provide Person-Centered Care  Description: Use a family-focused approach to care; encourage support system presence and participation.  Develop trust and rapport by proactively providing information, encouraging questions, addressing concerns and offering reassurance.  Acknowledge emotional response to hospitalization.  Recognize and utilize personal coping strategies and strengths; develop goals via shared decision-making.  Honor spiritual and cultural preferences.  Recent Flowsheet Documentation  Taken 1/16/2025 1626 by Arelis Ambrosio RN  Trust Relationship/Rapport:   care explained   choices provided   emotional support provided   empathic listening provided   questions answered   reassurance  provided   thoughts/feelings acknowledged   questions encouraged  Goal: Readiness for Transition of Care  Outcome: Progressing  Intervention: Mutually Develop Transition Plan  Description: Identify available resources for support (e.g., family, friends, community).  Identify and address barriers to ongoing treatment and home management (e.g., environmental, financial).  Provide opportunities to practice self-management skills.  Assess and monitor emotional readiness for transition.  Establish or reconnect linkage with outpatient providers or community-based services.  Recent Flowsheet Documentation  Taken 1/16/2025 1630 by Arelis Ambrosio, RN  Transportation Anticipated: family or friend will provide  Transportation Concerns: none  Patient/Family Anticipated Services at Transition: none  Patient/Family Anticipates Transition to: home with family   Goal Outcome Evaluation:

## 2025-01-16 NOTE — DISCHARGE SUMMARY
[facility]  TRANSFER SUMMARY      Patient Name: Carlota Santana  : 1960  MRN: [id number]    Date of Admission: 1/15/2025  Date of Discharge:  25   Primary Care Physician: Rehan Ramirez APRN      Presenting Problem:   Pneumonia [J18.9]  PNA (pneumonia) [J18.9]    Active and Resolved Hospital Problems:  Active Hospital Problems    Diagnosis POA    **Pneumonia [J18.9] Yes     Priority: High    Influenza A [J10.1] Yes     Priority: High      Resolved Hospital Problems   No resolved problems to display.         Hospital Course:   Brief Patient Summary:  Carlota Santana is a 64 y.o. female who was admitted in the emergency department diagnosed with bilateral pneumonia and influenza.  Patient is not oxygen requiring.  Patient's lactic, procalcitonin, white blood cell count were elevated and patient was placed on a wait list to [facility] for inpatient hospitalization.  Patient was placed in the CDU for observation status to receive antibiotics, breathing treatments, steroids, other medications.  Patient's vital signs have remained stable, her blood pressure has remained stable and she is not requiring a pressor.  Patient was placed on maintenance fluids.  Patient is not currently having any complaints and is able to eat and drink without any difficulty.  Patient's oxygen on room air is currently 95%.    2025:  Handoff was taken at 7 AM.  After handoff the patient's chart including but not limited to (vital signs, labs, diagnostics and provider notes).    Through the morning and into the afternoon the patient continued to have persistent tachycardia, tachypnea.  On physical exam patient maintained bilateral lower base rhonchi with wheezing through the apices.  Follow-up labs revealed persistent and increasing lactic acidosis, blood gas revealed compensated metabolic acidosis, CBC revealed left shift.      This patient was already on the watch list with hospitalist at [facility]  [facility].  Dr. Mukherjee was contacted as well as intensivist .  Patient did not meet criteria for admit to ICU and therefore patient was accepted to telemetry floor.        Plan:   # Sepsis without shock  -Continue empiric antibiotics    #Pneumonia  - continue IV rocephin and zithromax  - nebulizer treatments  - continue steroids  - continuous monitoring oxygen saturation       # Influenza:   - continue tamiflu  - continuous pulse ox monitoring     # SERS criteria positive:   -Fluid bolus given at transfer (500 mL lactated Ringer's)     # HTN:   -Hold home medications as long as blood pressure is soft     VTE Prophylaxis:  Mechanical VTE prophylaxis orders are present.    Nurses Notes reviewed and agree, including vitals, allergies, social history and prior medical history.     REVIEW OF SYSTEMS: All systems reviewed and not pertinent unless noted.  Review of Systems   Constitutional: Negative.  Positive for fatigue. Negative for chills, diaphoresis and fever.   HENT: Negative.  Negative for congestion, ear pain, postnasal drip, rhinorrhea, sinus pressure and sore throat.    Eyes: Negative.    Respiratory: Negative.  Positive for cough, shortness of breath and wheezing. Negative for chest tightness.    Cardiovascular: Negative.  Negative for chest pain and leg swelling.   Gastrointestinal: Negative.  Negative for abdominal distention, abdominal pain, constipation, diarrhea, nausea and vomiting.   Endocrine: Negative.    Genitourinary: Negative.  Negative for flank pain and frequency.   Musculoskeletal: Negative.    Skin: Negative.    Allergic/Immunologic: Negative.    Neurological: Negative.  Negative for dizziness, syncope, facial asymmetry, speech difficulty and headaches.   Hematological: Negative.    Psychiatric/Behavioral: Negative.         Past Medical History:   Diagnosis Date    Hypertension        Allergies:    Patient has no known allergies.      Past Surgical History:   Procedure  "Laterality Date     SECTION  1989    GASTRIC BYPASS           Social History     Socioeconomic History    Marital status:    Tobacco Use    Smoking status: Never     Passive exposure: Never    Smokeless tobacco: Never   Vaping Use    Vaping status: Never Used   Substance and Sexual Activity    Alcohol use: Yes     Alcohol/week: 3.0 standard drinks of alcohol     Types: 3 Glasses of wine per week     Comment: 3 weekly    Drug use: Never    Sexual activity: Not Currently     Partners: Male     Birth control/protection: Post-menopausal, Partner of same sex, Surgical         Family History   Problem Relation Age of Onset    Mental illness Mother     Cancer Father        Objective  Physical Exam:  /56 (BP Location: Left arm, Patient Position: Lying)   Pulse 102   Temp 98.5 °F (36.9 °C) (Oral)   Resp (!) 29 Comment: before tx  Ht 160 cm (62.99\")   Wt 83.9 kg (185 lb)   SpO2 97%   BMI 32.78 kg/m²      Physical Exam  Vitals reviewed.   Constitutional:       General: She is not in acute distress.     Appearance: Normal appearance. She is ill-appearing. She is not toxic-appearing or diaphoretic.   HENT:      Head: Normocephalic and atraumatic.      Nose: Nose normal. No congestion or rhinorrhea.      Mouth/Throat:      Mouth: Mucous membranes are moist.      Pharynx: Oropharynx is clear.   Eyes:      Extraocular Movements: Extraocular movements intact.      Conjunctiva/sclera: Conjunctivae normal.      Pupils: Pupils are equal, round, and reactive to light.   Cardiovascular:      Rate and Rhythm: Normal rate and regular rhythm.      Pulses: Normal pulses.      Heart sounds: Normal heart sounds. No murmur heard.     No friction rub. No gallop.   Pulmonary:      Effort: Respiratory distress present.      Breath sounds: Wheezing and rhonchi present. No rales.   Abdominal:      General: Bowel sounds are normal. There is no distension.      Palpations: Abdomen is soft.      Tenderness: There is no " guarding.   Musculoskeletal:         General: Normal range of motion.      Cervical back: Normal range of motion and neck supple. No rigidity.   Skin:     General: Skin is warm and dry.      Capillary Refill: Capillary refill takes less than 2 seconds.   Neurological:      General: No focal deficit present.      Mental Status: She is alert and oriented to person, place, and time.   Psychiatric:         Mood and Affect: Mood normal.         Behavior: Behavior normal.         Thought Content: Thought content normal.         Judgment: Judgment normal.         Procedures    XR Chest 1 View    Result Date: 1/16/2025  XR CHEST 1 VW Date of Exam: 1/16/2025 5:55 AM EST Indication: pneumonia Comparison: 1/15/2025 and chest CT same date. Findings: There is patchy consolidated pneumonia in the perihilar lung bilaterally, left greater than right. No pneumothorax or pleural effusion. Heart size and pulmonary vasculature appear within normal limits.     Impression: Impression: Bilateral perihilar pneumonia. Electronically Signed: Ayo Rodrigues MD  1/16/2025 6:11 AM EST  Workstation ID: [id number]    CT Angiogram Chest Pulmonary Embolism    Result Date: 1/15/2025  CT ANGIOGRAM CHEST PULMONARY EMBOLISM Date of Exam: 1/15/2025 6:10 PM EST Indication: dyspnea, elevated d dimer. Comparison: Same day chest radiograph Technique: Axial CT images were obtained of the chest after the uneventful intravenous administration of 75 mL Isovue-370 utilizing pulmonary embolism protocol.  In addition, a 3-D volume rendered image was created for interpretation.  Reconstructed coronal and sagittal images were also obtained. Automated exposure control and iterative construction methods were used. Findings: Diagnostic quality: Contrast opacification of the pulmonary arterial circulation is adequate for assessment of pulmonary embolism. Study is significantly limited by respiratory motion artifact. Pulmonary arteries: No evidence of pulmonary embolus.  Main pulmonary artery is borderline enlarged measuring 3 cm. Heart and pericardium:No flattening of the interventricular septum. Coronary artery calcification is seen. No substantial pericardial effusion. Vessels:Normal caliber aorta. Atherosclerotic calcification of the aorta. No evidence of acute aortic injury. Venous structures including the superior vena cava and inferior vena cava appear grossly patent. Mediastinum: Small hiatal hernia. Unremarkable appearance of the esophagus. No enlarged or suspicious mediastinal or hilar lymphadenopathy. No anterior mediastinal masses. Lower neck:No suspicious or enlarged supraclavicular or axillary lymphadenopathy. Normal appearance of the thyroid. Pulmonary parenchyma: Consolidation seen predominantly in the left lower lobe and right middle lobe. Few consolidative opacity seen in the left upper lobe as well. No suspicious pulmonary nodules. Pleura:No evidence of pleural effusion or pneumothorax. Airways:Central and segmental airways are clear. Chest wall and bones:No acute osseous abnormality. No substantial degenerative changes of thoracic spine. Unremarkable appearance of soft tissues. Upper abdomen:No lesion seen within the visualized liver. Postsurgical changes of the stomach. Distended gallbladder without evidence of cholecystitis.     Impression: Impression: 1.Significantly limited examination due to respiratory motion artifact. 2.No evidence of central occlusive pulmonary embolus given these limitations. 3.Multifocal consolidative opacities predominantly in the left lower lobe and right middle lobe. This is most consistent with multifocal pneumonia. 4.Borderline enlarged main pulmonary artery which can be seen in the setting of pulmonary hypertension. Electronically Signed: Landen Ross MD  1/15/2025 7:08 PM EST  Workstation ID: [id number]    XR Chest 1 View    Result Date: 1/15/2025  XR CHEST 1 VW Date of Exam: 1/15/2025 4:34 PM EST Indication: Weak/Dizzy/AMS  "triage protocol Comparison: None available. Findings: Cardiomediastinal silhouette is within normal limits. Perihilar opacities. No pleural effusion or pneumothorax. No evidence of acute osseous abnormalities. Visualized upper abdomen is unremarkable.     Impression: Impression: Perihilar opacities which may reflect infection or edema. Electronically Signed: Landen Ross MD  1/15/2025 5:37 PM EST  Workstation ID: [id number]          Lab 01/16/25  0841 01/16/25  0536 01/16/25  0230 01/15/25  2330 01/15/25  1946   LACTATE 3.8* 2.1* 3.5* 4.0* 3.3*       No results found for: \"SITE\", \"ALLENTEST\", \"PHART\", \"IVN0TKY\", \"PO2ART\", \"OVX0EPT\", \"BASEEXCESS\", \"L5NQDHTV\", \"HGBBG\", \"HCTABG\", \"OXYHEMOGLOBI\", \"METHHGBN\", \"CARBOXYHGB\", \"CO2CT\", \"BAROMETRIC\", \"MODALITY\", \"FIO2\"    Results from last 7 days   Lab Units 01/15/25  1807 01/15/25  1643   HSTROP T ng/L 8 11       Results from last 7 days   Lab Units 01/16/25  0536 01/15/25  1643   WBC 10*3/mm3 18.05* 17.26*   HEMOGLOBIN g/dL 10.8* 11.8*   HEMATOCRIT % 33.3* 36.4   PLATELETS 10*3/mm3 261 283   MONOCYTES % %  --  4.0*       Results from last 7 days   Lab Units 01/16/25  0536 01/15/25  1643   SODIUM mmol/L 140 134*   POTASSIUM mmol/L 3.8 4.2   CHLORIDE mmol/L 105 97*   CO2 mmol/L 25.2 22.2   BUN mg/dL 19 24*   CREATININE mg/dL 0.72 1.05*   CALCIUM mg/dL 8.3* 9.0   BILIRUBIN mg/dL 0.2 0.5   ALK PHOS U/L 109 109   ALT (SGPT) U/L 13 13   AST (SGOT) U/L 32 43*   GLUCOSE mg/dL 150* 161*       Lab Results   Component Value Date    CHOL 207 (H) 04/27/2024    TRIG 93 04/27/2024    HDL 74 (H) 04/27/2024     (H) 04/27/2024               No results found for: \"URINECX\"    @[email or web address];urinedrugscreen@    ED Disposition       ED Disposition   Decision to Admit    Condition   --    Comment   --                    Discharge Medication List:      Your medication list        CONTINUE taking these medications        Instructions Last Dose Given Next Dose Due   amLODIPine 5 MG " tablet  Commonly known as: NORVASC      Take 1 tablet by mouth Daily.       Buffered Vitamin C 1000 MG capsule      Take  by mouth.       fluticasone 50 MCG/ACT nasal spray  Commonly known as: Flonase      2 sprays into the nostril(s) as directed by provider Daily.       losartan 100 MG tablet  Commonly known as: COZAAR      Take 1 tablet by mouth Daily.       Magnesium 250 MG tablet      Take  by mouth.       meloxicam 7.5 MG tablet  Commonly known as: MOBIC      Take 1 tablet by mouth Daily.       MULTIVITAMIN ADULT EXTRA C PO      Take  by mouth.       multivitamin tablet tablet      Take  by mouth.       omeprazole 40 MG capsule  Commonly known as: priLOSEC      Take 1 capsule by mouth Daily.       sodium-potassium-magnesium sulfates 17.5-3.13-1.6 GM/177ML solution oral solution  Commonly known as: Suprep Bowel Prep Kit      Take 1 bottle by mouth Take As Directed. Follow instructions that were mailed to your home. If you didn't receive these call [phone].       Vitamin D3 50 MCG (2000 UT) capsule      Take 1 capsule by mouth Daily.       vitamin E 400 UNIT capsule      Take 1 capsule by mouth Daily.                 Antwon Downey Jr., PA   01/16/25   11:47 EST       Time Spent on Discharge:  I spent  45  minutes on this discharge activity which included: face-to-face encounter with the patient, reviewing the data in the system, coordination of the care with the nursing staff as well as consultants, documentation, and entering orders.

## 2025-01-16 NOTE — PLAN OF CARE
Goal Outcome Evaluation:           Progress: no change   Pt rested well. Pt wakes very easily. Pt's serum lactate was monitored throughout shift and improved but remained critical.

## 2025-01-16 NOTE — H&P
[facility] Medicine Services  HISTORY AND PHYSICAL    Patient Name: Carlota Santana  : 1960  MRN: [id number]  Primary Care Physician: Rehan Ramirez APRN  Date of admission: 1/15/2025    Subjective   Subjective     Chief Complaint:  Cough/ soa/ weakness     HPI:  Carlota Santana is a 64 y.o. [place] speaking female with PMH of HTN and GERD presented to ED with complaints of SOA and weakness. States she began to feel ill one week ago with dry cough. Progressed and began having a lot of fatigue, malaise and ROTHMAN. Can to ED for evaluation.  in room, states he recently had bronchitis. Patient denies fever, chills. In ED found to have multifocal PNA and Influenza A. Started on Tamiflu. Stayed overnight OBS at [place] ED and became more tachypnic and tachycardic. Started on IV Azith and steroids. With elevated Ddimer, CTA chest to rule out PE, negative. Started to require supplemental oxygen and increased Lactate. Received IVF and asked to transfer to [facility] for continued care. Started on IV Rocephin in addition to Azith, and continued IVF bolus.   Hospitalist will admit for continued evaluation and treatment.   Interpretor used in H&P #[id number]        Review of Systems   Constitutional:  Positive for appetite change and fatigue. Negative for activity change, chills and fever.   HENT:  Negative for congestion, sore throat, trouble swallowing and voice change.    Eyes:  Negative for photophobia and visual disturbance.   Respiratory:  Positive for cough, shortness of breath and wheezing.    Cardiovascular:  Negative for chest pain, palpitations and leg swelling.   Gastrointestinal:  Negative for abdominal distention, abdominal pain, constipation, diarrhea, nausea and vomiting.   Endocrine: Negative for cold intolerance and heat intolerance.   Genitourinary:  Negative for difficulty urinating, dysuria and flank pain.   Musculoskeletal:  Negative for arthralgias, back pain, gait  problem and joint swelling.   Skin:  Negative for color change, pallor, rash and wound.   Neurological:  Positive for weakness. Negative for dizziness, syncope, facial asymmetry, speech difficulty, light-headedness, numbness and headaches.   Hematological:  Negative for adenopathy. Does not bruise/bleed easily.   Psychiatric/Behavioral:  Negative for agitation, behavioral problems and confusion.             Personal History     Past Medical History:   Diagnosis Date   • Hypertension              Past Surgical History:   Procedure Laterality Date   •  SECTION  1989   • GASTRIC BYPASS         Family History:  family history includes Cancer in her father; Mental illness in her mother.     Social History:  reports that she has never smoked. She has never been exposed to tobacco smoke. She has never used smokeless tobacco. She reports current alcohol use of about 3.0 standard drinks of alcohol per week. She reports that she does not use drugs.  Social History     Social History Narrative   • Not on file       Medications:  Buffered Vitamin C, Magnesium, Multiple Vitamins-Minerals, Vitamin D3, amLODIPine, fluticasone, meloxicam, multivitamin, omeprazole, and vitamin E    No Known Allergies    Objective   Objective     Vital Signs:   Temp:  [98.3 °F (36.8 °C)-98.5 °F (36.9 °C)] 98.3 °F (36.8 °C)  Heart Rate:  [] 101  Resp:  [16-32] 18  BP: (102-132)/(55-72) 110/57  Flow (L/min) (Oxygen Therapy):  [2] 2    Physical Exam   Constitutional: No acute distress, awake, alert  HENT: NCAT, mucous membranes moist  Respiratory: Rhonchi with decreased bases and expiratory wheezing, no rales, respiratory effort normal on 2LNC   Cardiovascular: RRR, no murmurs, rubs, or gallops  Gastrointestinal: Positive bowel sounds, soft, nontender, nondistended  Musculoskeletal: No bilateral ankle edema  Psychiatric: Appropriate affect, cooperative  Neurologic: Oriented x 3, strength symmetric in all extremities, Cranial Nerves  grossly intact to confrontation, speech clear  Skin: No rashes     Result Review:  I have personally reviewed the results from the time of this admission to 1/16/2025 17:48 EST and agree with these findings:  []  Laboratory list / accordion  []  Microbiology  []  Radiology  []  EKG/Telemetry   []  Cardiology/Vascular   []  Pathology  []  Old records  []  Other:  Most notable findings include:    LAB RESULTS:      Lab 01/16/25  1353 01/16/25  0841 01/16/25  0536 01/16/25  0230 01/15/25  2330 01/15/25  1946 01/15/25  1643   WBC 16.99*  --  18.42*  18.05*  --   --   --  17.26*   HEMOGLOBIN 9.9*  --  10.9*  10.8*  --   --   --  11.8*   HEMATOCRIT 32.6*  --  34.7  33.3*  --   --   --  36.4   PLATELETS 236  --  280  261  --   --   --  283   NEUTROS ABS 14.95*  --  16.95*  --   --   --  14.50*   MCV 92.6  --  88.5  86.7  --   --   --  86.9   CRP  --   --  39.50*  --   --   --   --    PROCALCITONIN  --   --  4.85*  --   --   --  6.09*   LACTATE 5.8* 3.8* 2.1* 3.5* 4.0*   < > 2.9*   D DIMER QUANT  --   --   --   --   --   --  1.69*    < > = values in this interval not displayed.         Lab 01/16/25  1353 01/16/25  0536 01/15/25  1643   SODIUM 137 140 134*   POTASSIUM 3.5 3.8 4.2   CHLORIDE 104 105 97*   CO2 16.8* 25.2 22.2   ANION GAP 16.2* 9.8 14.8   BUN 18 19 24*   CREATININE 0.75 0.72 1.05*   EGFR 89.6 94.1 59.8*   GLUCOSE 335* 150* 161*   CALCIUM 8.1* 8.3* 9.0   MAGNESIUM  --  2.0 2.1   PHOSPHORUS 1.6* 2.2*  --          Lab 01/16/25  0536 01/15/25  1643   TOTAL PROTEIN 6.5 7.5   ALBUMIN 3.2* 3.8   GLOBULIN 3.3 3.7   ALT (SGPT) 13 13   AST (SGOT) 32 43*   BILIRUBIN 0.2 0.5   ALK PHOS 109 109         Lab 01/15/25  1807 01/15/25  1643   PROBNP  --  1,527.0*   HSTROP T 8 11                 Lab 01/16/25  1258   PH, ARTERIAL 7.407   PCO2, ARTERIAL 28.0*   PO2 ART 88.8   FIO2 28   HCO3 ART 17.6*   BASE EXCESS ART -6.0*   CARBOXYHEMOGLOBIN 0.9     Brief Urine Lab Results  (Last result in the past 365 days)        Color    Clarity   Blood   Leuk Est   Nitrite   Protein   CREAT   Urine HCG        01/15/25 1841 Yellow   Clear   Negative   Negative   Negative   Negative                 Microbiology Results (last 10 days)       Procedure Component Value - Date/Time    MRSA Screen, PCR (Inpatient) - Swab, Nares [[id number]]  (Normal) Collected: 01/16/25 0846    Lab Status: Final result Specimen: Swab from Nares Updated: 01/16/25 1330     MRSA PCR Negative    Narrative:      The negative predictive value of this diagnostic test is high and should only be used to consider de-escalating anti-MRSA therapy. A positive result may indicate colonization with MRSA and must be correlated clinically.  MRSA Negative    COVID PRE-OP / PRE-PROCEDURE SCREENING ORDER (NO ISOLATION) - Swab, Nasopharynx [[id number]]  (Abnormal) Collected: 01/15/25 1725    Lab Status: Final result Specimen: Swab from Nasopharynx Updated: 01/15/25 1829    Narrative:      The following orders were created for panel order COVID PRE-OP / PRE-PROCEDURE SCREENING ORDER (NO ISOLATION) - Swab, Nasopharynx.  Procedure                               Abnormality         Status                     ---------                               -----------         ------                     Respiratory Panel PCR w/...[[id number]]  Abnormal            Final result                 Please view results for these tests on the individual orders.    Respiratory Panel PCR w/COVID-19(SARS-CoV-2) ALEJANDRA/CONSTANCE/NITHIN/PAD/COR/SHAUN In-House, NP Swab in UTM/VTM, 2 HR TAT - Swab, Nasopharynx [[id number]]  (Abnormal) Collected: 01/15/25 1725    Lab Status: Final result Specimen: Swab from Nasopharynx Updated: 01/15/25 1829     ADENOVIRUS, PCR Not Detected     Coronavirus 229E Not Detected     Coronavirus HKU1 Not Detected     Coronavirus NL63 Not Detected     Coronavirus OC43 Not Detected     COVID19 Not Detected     Human Metapneumovirus Not Detected     Human Rhinovirus/Enterovirus Not Detected     Influenza A PCR Not  Detected     Influenza A H1 Not Detected     Influenza A H1 2009 PCR Not Detected     Influenza A H3 Detected     Influenza B PCR Not Detected     Parainfluenza Virus 1 Not Detected     Parainfluenza Virus 2 Not Detected     Parainfluenza Virus 3 Not Detected     Parainfluenza Virus 4 Not Detected     RSV, PCR Not Detected     Bordetella pertussis pcr Not Detected     Bordetella parapertussis PCR Not Detected     Chlamydophila pneumoniae PCR Not Detected     Mycoplasma pneumo by PCR Not Detected    Narrative:      In the setting of a positive respiratory panel with a viral infection PLUS a negative procalcitonin without other underlying concern for bacterial infection, consider observing off antibiotics or discontinuation of antibiotics and continue supportive care. If the respiratory panel is positive for atypical bacterial infection (Bordetella pertussis, Chlamydophila pneumoniae, or Mycoplasma pneumoniae), consider antibiotic de-escalation to target atypical bacterial infection.            XR Chest 1 View    Result Date: 1/16/2025  XR CHEST 1 VW Date of Exam: 1/16/2025 5:55 AM EST Indication: pneumonia Comparison: 1/15/2025 and chest CT same date. Findings: There is patchy consolidated pneumonia in the perihilar lung bilaterally, left greater than right. No pneumothorax or pleural effusion. Heart size and pulmonary vasculature appear within normal limits.     Impression: Impression: Bilateral perihilar pneumonia. Electronically Signed: Ayo Rodrigues MD  1/16/2025 6:11 AM EST  Workstation ID: [id number]    CT Angiogram Chest Pulmonary Embolism    Result Date: 1/15/2025  CT ANGIOGRAM CHEST PULMONARY EMBOLISM Date of Exam: 1/15/2025 6:10 PM EST Indication: dyspnea, elevated d dimer. Comparison: Same day chest radiograph Technique: Axial CT images were obtained of the chest after the uneventful intravenous administration of 75 mL Isovue-370 utilizing pulmonary embolism protocol.  In addition, a 3-D volume rendered  image was created for interpretation.  Reconstructed coronal and sagittal images were also obtained. Automated exposure control and iterative construction methods were used. Findings: Diagnostic quality: Contrast opacification of the pulmonary arterial circulation is adequate for assessment of pulmonary embolism. Study is significantly limited by respiratory motion artifact. Pulmonary arteries: No evidence of pulmonary embolus. Main pulmonary artery is borderline enlarged measuring 3 cm. Heart and pericardium:No flattening of the interventricular septum. Coronary artery calcification is seen. No substantial pericardial effusion. Vessels:Normal caliber aorta. Atherosclerotic calcification of the aorta. No evidence of acute aortic injury. Venous structures including the superior vena cava and inferior vena cava appear grossly patent. Mediastinum: Small hiatal hernia. Unremarkable appearance of the esophagus. No enlarged or suspicious mediastinal or hilar lymphadenopathy. No anterior mediastinal masses. Lower neck:No suspicious or enlarged supraclavicular or axillary lymphadenopathy. Normal appearance of the thyroid. Pulmonary parenchyma: Consolidation seen predominantly in the left lower lobe and right middle lobe. Few consolidative opacity seen in the left upper lobe as well. No suspicious pulmonary nodules. Pleura:No evidence of pleural effusion or pneumothorax. Airways:Central and segmental airways are clear. Chest wall and bones:No acute osseous abnormality. No substantial degenerative changes of thoracic spine. Unremarkable appearance of soft tissues. Upper abdomen:No lesion seen within the visualized liver. Postsurgical changes of the stomach. Distended gallbladder without evidence of cholecystitis.     Impression: Impression: 1.Significantly limited examination due to respiratory motion artifact. 2.No evidence of central occlusive pulmonary embolus given these limitations. 3.Multifocal consolidative opacities  predominantly in the left lower lobe and right middle lobe. This is most consistent with multifocal pneumonia. 4.Borderline enlarged main pulmonary artery which can be seen in the setting of pulmonary hypertension. Electronically Signed: Landen Ross MD  1/15/2025 7:08 PM EST  Workstation ID: [id number]    XR Chest 1 View    Result Date: 1/15/2025  XR CHEST 1 VW Date of Exam: 1/15/2025 4:34 PM EST Indication: Weak/Dizzy/AMS triage protocol Comparison: None available. Findings: Cardiomediastinal silhouette is within normal limits. Perihilar opacities. No pleural effusion or pneumothorax. No evidence of acute osseous abnormalities. Visualized upper abdomen is unremarkable.     Impression: Impression: Perihilar opacities which may reflect infection or edema. Electronically Signed: Landen Ross MD  1/15/2025 5:37 PM EST  Workstation ID: [id number]         Assessment & Plan   Assessment & Plan       Pneumonia    Influenza A    Sepsis due to pneumonia    Acute respiratory failure with hypoxia      Sepsis   Po multifocal pneumonia  Acute resp failure with hypoxia  Influenza A   --Tamiflu x 5 days  --IVF bolus and infusion   --due to hypotension holding home BP meds   --Duonebs  --Solumedrol IV BID  --IV Rocephin and Azith   --sputum culture pending  --monitor BC x 2  --urine studies pending     HTN   --holding home BP meds  --restart when able     DVT prophylaxis:  Heparin     CODE STATUS:    Level Of Support Discussed With: Patient  Code Status (Patient has no pulse and is not breathing): CPR (Attempt to Resuscitate)  Medical Interventions (Patient has pulse or is breathing): Full Support      Expected Discharge  Expected Discharge Date: 1/18/2025; Expected Discharge Time:         Signature: Electronically signed by WALDEMAR Mathew, 01/16/25, 6:13 PM EST

## 2025-01-17 PROBLEM — J18.9 PNA (PNEUMONIA): Status: ACTIVE | Noted: 2025-01-17

## 2025-01-17 LAB
ANION GAP SERPL CALCULATED.3IONS-SCNC: 10 MMOL/L (ref 5–15)
BASOPHILS # BLD MANUAL: 0 10*3/MM3 (ref 0–0.2)
BASOPHILS NFR BLD MANUAL: 0 % (ref 0–1.5)
BUN SERPL-MCNC: 15 MG/DL (ref 8–23)
BUN/CREAT SERPL: 27.3 (ref 7–25)
BURR CELLS BLD QL SMEAR: ABNORMAL
CALCIUM SPEC-SCNC: 8.4 MG/DL (ref 8.6–10.5)
CHLORIDE SERPL-SCNC: 108 MMOL/L (ref 98–107)
CO2 SERPL-SCNC: 21 MMOL/L (ref 22–29)
CREAT SERPL-MCNC: 0.55 MG/DL (ref 0.57–1)
DEPRECATED RDW RBC AUTO: 51.1 FL (ref 37–54)
EGFRCR SERPLBLD CKD-EPI 2021: 103.1 ML/MIN/1.73
EOSINOPHIL # BLD MANUAL: 0 10*3/MM3 (ref 0–0.4)
EOSINOPHIL NFR BLD MANUAL: 0 % (ref 0.3–6.2)
ERYTHROCYTE [DISTWIDTH] IN BLOOD BY AUTOMATED COUNT: 15.8 % (ref 12.3–15.4)
GLUCOSE SERPL-MCNC: 165 MG/DL (ref 65–99)
HCT VFR BLD AUTO: 30.8 % (ref 34–46.6)
HGB BLD-MCNC: 9.9 G/DL (ref 12–15.9)
L PNEUMO1 AG UR QL IA: POSITIVE
LYMPHOCYTES # BLD MANUAL: 0.25 10*3/MM3 (ref 0.7–3.1)
LYMPHOCYTES NFR BLD MANUAL: 1 % (ref 5–12)
MCH RBC QN AUTO: 28.4 PG (ref 26.6–33)
MCHC RBC AUTO-ENTMCNC: 32.1 G/DL (ref 31.5–35.7)
MCV RBC AUTO: 88.3 FL (ref 79–97)
MONOCYTES # BLD: 0.13 10*3/MM3 (ref 0.1–0.9)
NEUTROPHILS # BLD AUTO: 12.22 10*3/MM3 (ref 1.7–7)
NEUTROPHILS NFR BLD MANUAL: 61 % (ref 42.7–76)
NEUTS BAND NFR BLD MANUAL: 36 % (ref 0–5)
NRBC SPEC MANUAL: 0 /100 WBC (ref 0–0.2)
PHOSPHATE SERPL-MCNC: 3 MG/DL (ref 2.5–4.5)
PLAT MORPH BLD: NORMAL
PLATELET # BLD AUTO: 246 10*3/MM3 (ref 140–450)
PMV BLD AUTO: 10.1 FL (ref 6–12)
POTASSIUM SERPL-SCNC: 4.6 MMOL/L (ref 3.5–5.2)
RBC # BLD AUTO: 3.49 10*6/MM3 (ref 3.77–5.28)
S PNEUM AG SPEC QL LA: POSITIVE
SODIUM SERPL-SCNC: 139 MMOL/L (ref 136–145)
VARIANT LYMPHS NFR BLD MANUAL: 2 % (ref 19.6–45.3)
WBC MORPH BLD: NORMAL
WBC NRBC COR # BLD AUTO: 12.6 10*3/MM3 (ref 3.4–10.8)

## 2025-01-17 PROCEDURE — 94799 UNLISTED PULMONARY SVC/PX: CPT

## 2025-01-17 PROCEDURE — 99232 SBSQ HOSP IP/OBS MODERATE 35: CPT | Performed by: INTERNAL MEDICINE

## 2025-01-17 PROCEDURE — 96372 THER/PROPH/DIAG INJ SC/IM: CPT

## 2025-01-17 PROCEDURE — 25010000002 METHYLPREDNISOLONE PER 125 MG: Performed by: NURSE PRACTITIONER

## 2025-01-17 PROCEDURE — 25810000003 SODIUM CHLORIDE 0.9 % SOLUTION: Performed by: NURSE PRACTITIONER

## 2025-01-17 PROCEDURE — 80048 BASIC METABOLIC PNL TOTAL CA: CPT | Performed by: NURSE PRACTITIONER

## 2025-01-17 PROCEDURE — 85025 COMPLETE CBC W/AUTO DIFF WBC: CPT | Performed by: NURSE PRACTITIONER

## 2025-01-17 PROCEDURE — 25010000002 CEFTRIAXONE PER 250 MG: Performed by: PHYSICIAN ASSISTANT

## 2025-01-17 PROCEDURE — 25010000002 HEPARIN (PORCINE) PER 1000 UNITS: Performed by: NURSE PRACTITIONER

## 2025-01-17 PROCEDURE — G0378 HOSPITAL OBSERVATION PER HR: HCPCS

## 2025-01-17 PROCEDURE — 96376 TX/PRO/DX INJ SAME DRUG ADON: CPT

## 2025-01-17 PROCEDURE — 96361 HYDRATE IV INFUSION ADD-ON: CPT

## 2025-01-17 PROCEDURE — 84100 ASSAY OF PHOSPHORUS: CPT | Performed by: NURSE PRACTITIONER

## 2025-01-17 RX ORDER — IPRATROPIUM BROMIDE AND ALBUTEROL SULFATE 2.5; .5 MG/3ML; MG/3ML
3 SOLUTION RESPIRATORY (INHALATION)
Status: DISCONTINUED | OUTPATIENT
Start: 2025-01-17 | End: 2025-01-19

## 2025-01-17 RX ORDER — ALBUTEROL SULFATE 90 UG/1
2 INHALANT RESPIRATORY (INHALATION)
Status: DISCONTINUED | OUTPATIENT
Start: 2025-01-17 | End: 2025-01-17

## 2025-01-17 RX ORDER — ALBUTEROL SULFATE 0.83 MG/ML
2.5 SOLUTION RESPIRATORY (INHALATION)
Status: DISCONTINUED | OUTPATIENT
Start: 2025-01-17 | End: 2025-01-19 | Stop reason: SDUPTHER

## 2025-01-17 RX ORDER — CODEINE PHOSPHATE AND GUAIFENESIN 10; 100 MG/5ML; MG/5ML
5 SOLUTION ORAL EVERY 4 HOURS PRN
Status: DISCONTINUED | OUTPATIENT
Start: 2025-01-17 | End: 2025-01-19 | Stop reason: HOSPADM

## 2025-01-17 RX ORDER — AZITHROMYCIN 250 MG/1
500 TABLET, FILM COATED ORAL ONCE
Status: COMPLETED | OUTPATIENT
Start: 2025-01-17 | End: 2025-01-17

## 2025-01-17 RX ADMIN — GUAIFENESIN AND CODEINE PHOSPHATE 5 ML: 100; 10 SOLUTION ORAL at 16:32

## 2025-01-17 RX ADMIN — CEFTRIAXONE 1 G: 1 INJECTION, POWDER, FOR SOLUTION INTRAMUSCULAR; INTRAVENOUS at 09:13

## 2025-01-17 RX ADMIN — HEPARIN SODIUM 5000 UNITS: 5000 INJECTION INTRAVENOUS; SUBCUTANEOUS at 05:17

## 2025-01-17 RX ADMIN — AZITHROMYCIN 500 MG: 250 TABLET, FILM COATED ORAL at 13:44

## 2025-01-17 RX ADMIN — FAMOTIDINE 40 MG: 20 TABLET, FILM COATED ORAL at 09:14

## 2025-01-17 RX ADMIN — GUAIFENESIN 1200 MG: 600 TABLET, EXTENDED RELEASE ORAL at 09:14

## 2025-01-17 RX ADMIN — HEPARIN SODIUM 5000 UNITS: 5000 INJECTION INTRAVENOUS; SUBCUTANEOUS at 13:44

## 2025-01-17 RX ADMIN — GUAIFENESIN 1200 MG: 600 TABLET, EXTENDED RELEASE ORAL at 21:29

## 2025-01-17 RX ADMIN — HEPARIN SODIUM 5000 UNITS: 5000 INJECTION INTRAVENOUS; SUBCUTANEOUS at 21:29

## 2025-01-17 RX ADMIN — PANTOPRAZOLE SODIUM 40 MG: 40 TABLET, DELAYED RELEASE ORAL at 05:17

## 2025-01-17 RX ADMIN — ALBUTEROL SULFATE 2.5 MG: 2.5 SOLUTION RESPIRATORY (INHALATION) at 17:00

## 2025-01-17 RX ADMIN — METHYLPREDNISOLONE SODIUM SUCCINATE 60 MG: 125 INJECTION INTRAMUSCULAR; INTRAVENOUS at 13:44

## 2025-01-17 RX ADMIN — IPRATROPIUM BROMIDE AND ALBUTEROL SULFATE 3 ML: 2.5; .5 SOLUTION RESPIRATORY (INHALATION) at 12:40

## 2025-01-17 RX ADMIN — OSELTAMIVIR PHOSPHATE 75 MG: 75 CAPSULE ORAL at 09:14

## 2025-01-17 RX ADMIN — ALBUTEROL SULFATE 2.5 MG: 2.5 SOLUTION RESPIRATORY (INHALATION) at 22:08

## 2025-01-17 RX ADMIN — SODIUM CHLORIDE 100 ML/HR: 9 INJECTION, SOLUTION INTRAVENOUS at 05:52

## 2025-01-17 RX ADMIN — OSELTAMIVIR PHOSPHATE 75 MG: 75 CAPSULE ORAL at 21:29

## 2025-01-17 NOTE — CASE MANAGEMENT/SOCIAL WORK
Continued Stay Note  [facility]     Patient Name: Carlota Santana  MRN: [id number]  Today's Date: 1/17/2025    Admit Date: 1/15/2025    Plan: Home   Discharge Plan       Row Name 01/17/25 1542       Plan    Plan Home    Plan Comments I spoke with patient and her daughter earlier. Patient lives with spouse in [place].  She uses no DME. She has [employer].   Her PCP is Rehan Ramirez.   no advanced directives.  is following for discharge planning needs.    Final Discharge Disposition Code 01 - home or self-care                   Discharge Codes    No documentation.                 Expected Discharge Date and Time       Expected Discharge Date Expected Discharge Time    Jan 18, 2025               Susie Porter RN

## 2025-01-17 NOTE — PROGRESS NOTES
[facility] Medicine Services  PROGRESS NOTE    Patient Name: Carlota Santana  : 1960  MRN: [id number]    Date of Admission: 1/15/2025  Primary Care Physician: Rehan Ramirez APRN    Subjective   Subjective     CC: f/u PNA    HPI: Up in bed with family in room assisting as  per patient request. Says she is feeling a little better. Still SOA with exertion. Still with cough.      Objective   Objective     Vital Signs:   Temp:  [98 °F (36.7 °C)-98.8 °F (37.1 °C)] 98.1 °F (36.7 °C)  Heart Rate:  [] 90  Resp:  [18-32] 18  BP: (110-132)/(55-72) 132/68  Flow (L/min) (Oxygen Therapy):  [2] 2     Physical Exam:  Constitutional: No acute distress, awake, alert  HENT: NCAT, mucous membranes moist  Respiratory: Clear to auscultation bilaterally, respiratory effort normal   Cardiovascular: RRR, no murmurs, rubs, or gallops  Gastrointestinal: Positive bowel sounds, soft, nontender, nondistended  Musculoskeletal: No bilateral ankle edema  Psychiatric: Appropriate affect, cooperative  Neurologic: Oriented x 3, strength symmetric in all extremities, Cranial Nerves grossly intact to confrontation, speech clear  Skin: No rashes     Results Reviewed:  LAB RESULTS:      Lab 25  0350 25  1751 25  1353 25  0841 25  0536 25  0230 01/15/25  1946 01/15/25  1807 01/15/25  1643   WBC 12.60*  --  16.99*  --  18.42*  18.05*  --   --   --  17.26*   HEMOGLOBIN 9.9*  --  9.9*  --  10.9*  10.8*  --   --   --  11.8*   HEMATOCRIT 30.8*  --  32.6*  --  34.7  33.3*  --   --   --  36.4   PLATELETS 246  --  236  --  280  261  --   --   --  283   NEUTROS ABS 12.22*  --  14.95*  --  16.95*  --   --   --  14.50*   EOS ABS 0.00  --   --   --   --   --   --   --   --    MCV 88.3  --  92.6  --  88.5  86.7  --   --   --  86.9   CRP  --   --   --   --  39.50*  --   --   --   --    PROCALCITONIN  --   --   --   --  4.85*  --   --   --  6.09*   LACTATE  --  5.2* 5.8*  3.8* 2.1* 3.5*   < >  --  2.9*   D DIMER QUANT  --   --   --   --   --   --   --   --  1.69*   HSTROP T  --   --   --   --   --   --   --  8 11    < > = values in this interval not displayed.         Lab 01/17/25  0350 01/16/25  1751 01/16/25  1353 01/16/25  0536 01/15/25  1643   SODIUM 139  --  137 140 134*   POTASSIUM 4.6  --  3.5 3.8 4.2   CHLORIDE 108*  --  104 105 97*   CO2 21.0*  --  16.8* 25.2 22.2   ANION GAP 10.0  --  16.2* 9.8 14.8   BUN 15  --  18 19 24*   CREATININE 0.55*  --  0.75 0.72 1.05*   EGFR 103.1  --  89.6 94.1 59.8*   GLUCOSE 165*  --  335* 150* 161*   CALCIUM 8.4*  --  8.1* 8.3* 9.0   MAGNESIUM  --   --   --  2.0 2.1   PHOSPHORUS 3.0  --  1.6* 2.2*  --    HEMOGLOBIN A1C  --  5.90*  --   --   --          Lab 01/16/25  0536 01/15/25  1643   TOTAL PROTEIN 6.5 7.5   ALBUMIN 3.2* 3.8   GLOBULIN 3.3 3.7   ALT (SGPT) 13 13   AST (SGOT) 32 43*   BILIRUBIN 0.2 0.5   ALK PHOS 109 109         Lab 01/15/25  1807 01/15/25  1643   PROBNP  --  1,527.0*   HSTROP T 8 11                 Lab 01/16/25  1258   PH, ARTERIAL 7.407   PCO2, ARTERIAL 28.0*   PO2 ART 88.8   FIO2 28   HCO3 ART 17.6*   BASE EXCESS ART -6.0*   CARBOXYHEMOGLOBIN 0.9     Brief Urine Lab Results  (Last result in the past 365 days)        Color   Clarity   Blood   Leuk Est   Nitrite   Protein   CREAT   Urine HCG        01/15/25 1841 Yellow   Clear   Negative   Negative   Negative   Negative                   Microbiology Results Abnormal       Procedure Component Value - Date/Time    S. Pneumo Ag Urine or CSF - Urine, Urine, Clean Catch [[id number]]  (Abnormal) Collected: 01/16/25 1144    Lab Status: Final result Specimen: Urine, Clean Catch Updated: 01/17/25 0054     Strep Pneumo Ag Positive    Legionella Antigen, Urine - Urine, Urine, Clean Catch [[id number]]  (Abnormal) Collected: 01/16/25 1144    Lab Status: Final result Specimen: Urine, Clean Catch Updated: 01/17/25 0040     LEGIONELLA ANTIGEN, URINE Positive    COVID PRE-OP / PRE-PROCEDURE  SCREENING ORDER (NO ISOLATION) - Swab, Nasopharynx [[id number]]  (Abnormal) Collected: 01/15/25 1725    Lab Status: Final result Specimen: Swab from Nasopharynx Updated: 01/15/25 1829    Narrative:      The following orders were created for panel order COVID PRE-OP / PRE-PROCEDURE SCREENING ORDER (NO ISOLATION) - Swab, Nasopharynx.  Procedure                               Abnormality         Status                     ---------                               -----------         ------                     Respiratory Panel PCR w/...[[id number]]  Abnormal            Final result                 Please view results for these tests on the individual orders.    Respiratory Panel PCR w/COVID-19(SARS-CoV-2) ALEJANDRA/CONSTANCE/NITHIN/PAD/COR/SHAUN In-House, NP Swab in UTM/VTM, 2 HR TAT - Swab, Nasopharynx [[id number]]  (Abnormal) Collected: 01/15/25 1725    Lab Status: Final result Specimen: Swab from Nasopharynx Updated: 01/15/25 1829     ADENOVIRUS, PCR Not Detected     Coronavirus 229E Not Detected     Coronavirus HKU1 Not Detected     Coronavirus NL63 Not Detected     Coronavirus OC43 Not Detected     COVID19 Not Detected     Human Metapneumovirus Not Detected     Human Rhinovirus/Enterovirus Not Detected     Influenza A PCR Not Detected     Influenza A H1 Not Detected     Influenza A H1 2009 PCR Not Detected     Influenza A H3 Detected     Influenza B PCR Not Detected     Parainfluenza Virus 1 Not Detected     Parainfluenza Virus 2 Not Detected     Parainfluenza Virus 3 Not Detected     Parainfluenza Virus 4 Not Detected     RSV, PCR Not Detected     Bordetella pertussis pcr Not Detected     Bordetella parapertussis PCR Not Detected     Chlamydophila pneumoniae PCR Not Detected     Mycoplasma pneumo by PCR Not Detected    Narrative:      In the setting of a positive respiratory panel with a viral infection PLUS a negative procalcitonin without other underlying concern for bacterial infection, consider observing off antibiotics or  discontinuation of antibiotics and continue supportive care. If the respiratory panel is positive for atypical bacterial infection (Bordetella pertussis, Chlamydophila pneumoniae, or Mycoplasma pneumoniae), consider antibiotic de-escalation to target atypical bacterial infection.            CT Lower Extremity Left With Contrast    Result Date: 1/16/2025  CT LOWER EXTREMITY LEFT W CONTRAST Date of Exam: 1/16/2025 7:48 PM EST Indication: cellulitis. Comparison: None available. Technique: Axial CT images were obtained of the left lower extremity after the uneventful intravenous administration of 100 mL Isovue-300.  Reconstructed coronal and sagittal images were also obtained. Automated exposure control and iterative construction methods were used. Findings: Bones: No evidence of fracture. No osseous erosions. Degenerative changes of the hip and knee. Degenerative changes of the foot. Small knee joint effusion. Degenerative changes of the lower lumbar spine. Soft tissues: Visualized pelvic organs are unremarkable. Visualized muscles are unremarkable. Vessels appear grossly intact. No evidence of soft tissue mass. No fluid collections. Mild soft tissue swelling along the first metatarsophalangeal joint. No substantial subcutaneous edema. Contralateral extremity. No evidence of acute osseous or soft tissue abnormality.     Impression: Impression: 1.No evidence of acute osseous or soft tissue abnormalities. Electronically Signed: Landen Ross MD  1/16/2025 8:37 PM EST  Workstation ID: [id number]    XR Chest 1 View    Result Date: 1/16/2025  XR CHEST 1 VW Date of Exam: 1/16/2025 5:55 AM EST Indication: pneumonia Comparison: 1/15/2025 and chest CT same date. Findings: There is patchy consolidated pneumonia in the perihilar lung bilaterally, left greater than right. No pneumothorax or pleural effusion. Heart size and pulmonary vasculature appear within normal limits.     Impression: Impression: Bilateral perihilar  pneumonia. Electronically Signed: Ayo Rodrigues MD  1/16/2025 6:11 AM EST  Workstation ID: [id number]    CT Angiogram Chest Pulmonary Embolism    Result Date: 1/15/2025  CT ANGIOGRAM CHEST PULMONARY EMBOLISM Date of Exam: 1/15/2025 6:10 PM EST Indication: dyspnea, elevated d dimer. Comparison: Same day chest radiograph Technique: Axial CT images were obtained of the chest after the uneventful intravenous administration of 75 mL Isovue-370 utilizing pulmonary embolism protocol.  In addition, a 3-D volume rendered image was created for interpretation.  Reconstructed coronal and sagittal images were also obtained. Automated exposure control and iterative construction methods were used. Findings: Diagnostic quality: Contrast opacification of the pulmonary arterial circulation is adequate for assessment of pulmonary embolism. Study is significantly limited by respiratory motion artifact. Pulmonary arteries: No evidence of pulmonary embolus. Main pulmonary artery is borderline enlarged measuring 3 cm. Heart and pericardium:No flattening of the interventricular septum. Coronary artery calcification is seen. No substantial pericardial effusion. Vessels:Normal caliber aorta. Atherosclerotic calcification of the aorta. No evidence of acute aortic injury. Venous structures including the superior vena cava and inferior vena cava appear grossly patent. Mediastinum: Small hiatal hernia. Unremarkable appearance of the esophagus. No enlarged or suspicious mediastinal or hilar lymphadenopathy. No anterior mediastinal masses. Lower neck:No suspicious or enlarged supraclavicular or axillary lymphadenopathy. Normal appearance of the thyroid. Pulmonary parenchyma: Consolidation seen predominantly in the left lower lobe and right middle lobe. Few consolidative opacity seen in the left upper lobe as well. No suspicious pulmonary nodules. Pleura:No evidence of pleural effusion or pneumothorax. Airways:Central and segmental airways are  clear. Chest wall and bones:No acute osseous abnormality. No substantial degenerative changes of thoracic spine. Unremarkable appearance of soft tissues. Upper abdomen:No lesion seen within the visualized liver. Postsurgical changes of the stomach. Distended gallbladder without evidence of cholecystitis.     Impression: Impression: 1.Significantly limited examination due to respiratory motion artifact. 2.No evidence of central occlusive pulmonary embolus given these limitations. 3.Multifocal consolidative opacities predominantly in the left lower lobe and right middle lobe. This is most consistent with multifocal pneumonia. 4.Borderline enlarged main pulmonary artery which can be seen in the setting of pulmonary hypertension. Electronically Signed: Landen Ross MD  1/15/2025 7:08 PM EST  Workstation ID: [id number]    XR Chest 1 View    Result Date: 1/15/2025  XR CHEST 1 VW Date of Exam: 1/15/2025 4:34 PM EST Indication: Weak/Dizzy/AMS triage protocol Comparison: None available. Findings: Cardiomediastinal silhouette is within normal limits. Perihilar opacities. No pleural effusion or pneumothorax. No evidence of acute osseous abnormalities. Visualized upper abdomen is unremarkable.     Impression: Impression: Perihilar opacities which may reflect infection or edema. Electronically Signed: Landen Ross MD  1/15/2025 5:37 PM EST  Workstation ID: [id number]         Current medications:  Scheduled Meds:[Held by provider] amLODIPine, 5 mg, Oral, Daily  azithromycin, 500 mg, Oral, Once  cefTRIAXone, 1 g, Intravenous, Q24H  guaiFENesin, 1,200 mg, Oral, Q12H  heparin (porcine), 5,000 Units, Subcutaneous, Q8H  ipratropium-albuterol, 3 mL, Nebulization, Q6H While Awake - RT  [Held by provider] losartan, 100 mg, Oral, Daily  methylPREDNISolone sodium succinate, 60 mg, Intravenous, Q12H  oseltamivir, 75 mg, Oral, Q12H  pantoprazole, 40 mg, Oral, Q AM  sodium chloride, 10 mL, Intravenous, Q12H  sodium chloride, 10 mL,  Intravenous, Q12H      Continuous Infusions:   PRN Meds:.  acetaminophen **OR** acetaminophen **OR** acetaminophen    Albuterol Sulfate NEB Orderable    senna-docusate sodium **AND** polyethylene glycol **AND** bisacodyl **AND** bisacodyl    Calcium Replacement - Follow Nurse / BPA Driven Protocol    Magnesium Standard Dose Replacement - Follow Nurse / BPA Driven Protocol    ondansetron    Phosphorus Replacement - Follow Nurse / BPA Driven Protocol    Potassium Replacement - Follow Nurse / BPA Driven Protocol    sodium chloride    sodium chloride    sodium chloride    sodium chloride    sodium chloride    Assessment & Plan   Assessment & Plan     Active Hospital Problems    Diagnosis  POA    **Pneumonia [J18.9]  Yes    Sepsis due to pneumonia [J18.9, A41.9]  Unknown    Acute respiratory failure with hypoxia [J96.01]  Unknown    Influenza A [J10.1]  Yes      Resolved Hospital Problems   No resolved problems to display.        Brief Hospital Course to date:  Carlota Santana is a 64 y.o. Upper sorbian speaking female with PMH of HTN and GERD presented to ED with complaints of SOA and weakness. States she began to feel ill one week ago with dry cough. Progressed and began having a lot of fatigue, malaise and ROTHMAN. Can to ED for evaluation.  in room, states he recently had bronchitis. Patient denies fever, chills. In ED found to have multifocal PNA and Influenza A.     Sepsis   Po legionella pneumonia  Acute resp failure with hypoxia  Influenza A   --Flu +, legionella ag +. Tamiflu x 5 days, continue abx.  --Duonebs  --Solumedrol IV BID  --Weaned O2 to 2L - should be able to wean from O2 today.     HTN   --holding home BP meds  --restart when able     Expected Discharge Location and Transportation:   Expected Discharge   Expected Discharge Date: 1/18/2025; Expected Discharge Time:      VTE Prophylaxis:  Pharmacologic & mechanical VTE prophylaxis orders are present.         AM-PAC 6 Clicks Score (PT): 24 (01/16/25  1625)    CODE STATUS:   Code Status and Medical Interventions: CPR (Attempt to Resuscitate); Full Support   Ordered at: 01/16/25 0452     Level Of Support Discussed With:    Patient     Code Status (Patient has no pulse and is not breathing):    CPR (Attempt to Resuscitate)     Medical Interventions (Patient has pulse or is breathing):    Full Support       Juliane Ugalde II, DO  01/17/25

## 2025-01-18 LAB
BACTERIA SPEC RESP CULT: ABNORMAL
BACTERIA SPEC RESP CULT: ABNORMAL
GRAM STN SPEC: ABNORMAL

## 2025-01-18 PROCEDURE — 96376 TX/PRO/DX INJ SAME DRUG ADON: CPT

## 2025-01-18 PROCEDURE — 25010000002 CEFTRIAXONE PER 250 MG: Performed by: PHYSICIAN ASSISTANT

## 2025-01-18 PROCEDURE — 96372 THER/PROPH/DIAG INJ SC/IM: CPT

## 2025-01-18 PROCEDURE — 94799 UNLISTED PULMONARY SVC/PX: CPT

## 2025-01-18 PROCEDURE — 94664 DEMO&/EVAL PT USE INHALER: CPT

## 2025-01-18 PROCEDURE — G0378 HOSPITAL OBSERVATION PER HR: HCPCS

## 2025-01-18 PROCEDURE — 25010000002 HEPARIN (PORCINE) PER 1000 UNITS: Performed by: NURSE PRACTITIONER

## 2025-01-18 PROCEDURE — 99232 SBSQ HOSP IP/OBS MODERATE 35: CPT | Performed by: INTERNAL MEDICINE

## 2025-01-18 PROCEDURE — 25010000002 METHYLPREDNISOLONE PER 125 MG: Performed by: NURSE PRACTITIONER

## 2025-01-18 RX ORDER — AZITHROMYCIN 250 MG/1
250 TABLET, FILM COATED ORAL
Status: COMPLETED | OUTPATIENT
Start: 2025-01-18 | End: 2025-01-19

## 2025-01-18 RX ORDER — CEFDINIR 300 MG/1
300 CAPSULE ORAL EVERY 12 HOURS SCHEDULED
Status: DISCONTINUED | OUTPATIENT
Start: 2025-01-18 | End: 2025-01-19 | Stop reason: HOSPADM

## 2025-01-18 RX ADMIN — GUAIFENESIN AND CODEINE PHOSPHATE 5 ML: 100; 10 SOLUTION ORAL at 16:11

## 2025-01-18 RX ADMIN — HEPARIN SODIUM 5000 UNITS: 5000 INJECTION INTRAVENOUS; SUBCUTANEOUS at 14:14

## 2025-01-18 RX ADMIN — IPRATROPIUM BROMIDE AND ALBUTEROL SULFATE 3 ML: 2.5; .5 SOLUTION RESPIRATORY (INHALATION) at 07:12

## 2025-01-18 RX ADMIN — METHYLPREDNISOLONE SODIUM SUCCINATE 60 MG: 125 INJECTION INTRAMUSCULAR; INTRAVENOUS at 02:30

## 2025-01-18 RX ADMIN — OSELTAMIVIR PHOSPHATE 75 MG: 75 CAPSULE ORAL at 08:00

## 2025-01-18 RX ADMIN — Medication 10 ML: at 22:28

## 2025-01-18 RX ADMIN — HEPARIN SODIUM 5000 UNITS: 5000 INJECTION INTRAVENOUS; SUBCUTANEOUS at 21:16

## 2025-01-18 RX ADMIN — OSELTAMIVIR PHOSPHATE 75 MG: 75 CAPSULE ORAL at 21:16

## 2025-01-18 RX ADMIN — GUAIFENESIN 1200 MG: 600 TABLET, EXTENDED RELEASE ORAL at 08:00

## 2025-01-18 RX ADMIN — PANTOPRAZOLE SODIUM 40 MG: 40 TABLET, DELAYED RELEASE ORAL at 06:26

## 2025-01-18 RX ADMIN — HEPARIN SODIUM 5000 UNITS: 5000 INJECTION INTRAVENOUS; SUBCUTANEOUS at 06:26

## 2025-01-18 RX ADMIN — IPRATROPIUM BROMIDE AND ALBUTEROL SULFATE 3 ML: 2.5; .5 SOLUTION RESPIRATORY (INHALATION) at 13:41

## 2025-01-18 RX ADMIN — AZITHROMYCIN 250 MG: 250 TABLET, FILM COATED ORAL at 16:11

## 2025-01-18 RX ADMIN — CEFDINIR 300 MG: 300 CAPSULE ORAL at 22:27

## 2025-01-18 RX ADMIN — ALBUTEROL SULFATE 2.5 MG: 2.5 SOLUTION RESPIRATORY (INHALATION) at 02:18

## 2025-01-18 RX ADMIN — Medication 10 ML: at 08:01

## 2025-01-18 RX ADMIN — GUAIFENESIN AND CODEINE PHOSPHATE 5 ML: 100; 10 SOLUTION ORAL at 08:17

## 2025-01-18 RX ADMIN — GUAIFENESIN 1200 MG: 600 TABLET, EXTENDED RELEASE ORAL at 21:16

## 2025-01-18 RX ADMIN — CEFDINIR 300 MG: 300 CAPSULE ORAL at 14:15

## 2025-01-18 RX ADMIN — CEFTRIAXONE 1 G: 1 INJECTION, POWDER, FOR SOLUTION INTRAMUSCULAR; INTRAVENOUS at 08:00

## 2025-01-18 RX ADMIN — IPRATROPIUM BROMIDE AND ALBUTEROL SULFATE 3 ML: 2.5; .5 SOLUTION RESPIRATORY (INHALATION) at 19:54

## 2025-01-18 NOTE — PROGRESS NOTES
Psychiatric Medicine Services  PROGRESS NOTE    Patient Name: Carlota Santana  : 1960  MRN: [id number]    Date of Admission: 1/15/2025  Primary Care Physician: Rehan Ramirez APRN    Subjective   Subjective     CC: f/u PNA    HPI: Up in bed with daughter in room. Feels better but still very fatigued with minimal exertion.      Objective   Objective     Vital Signs:   Temp:  [97.8 °F (36.6 °C)-98.2 °F (36.8 °C)] 98.2 °F (36.8 °C)  Heart Rate:  [] 72  Resp:  [16-18] 18  BP: (116-153)/(61-93) 118/67  Flow (L/min) (Oxygen Therapy):  [2] 2     Physical Exam:  Constitutional: No acute distress, awake, alert  HENT: NCAT, mucous membranes moist  Respiratory: Clear to auscultation bilaterally, respiratory effort normal   Cardiovascular: RRR, no murmurs, rubs, or gallops  Gastrointestinal: Positive bowel sounds, soft, nontender, nondistended  Musculoskeletal: No bilateral ankle edema  Psychiatric: Appropriate affect, cooperative  Neurologic: Oriented x 3, strength symmetric in all extremities, Cranial Nerves grossly intact to confrontation, speech clear  Skin: No rashes     Results Reviewed:  LAB RESULTS:      Lab 25  0350 25  1751 25  1353 25  0841 25  0536 25  0230 01/15/25  1946 01/15/25  1807 01/15/25  1643   WBC 12.60*  --  16.99*  --  18.42*  18.05*  --   --   --  17.26*   HEMOGLOBIN 9.9*  --  9.9*  --  10.9*  10.8*  --   --   --  11.8*   HEMATOCRIT 30.8*  --  32.6*  --  34.7  33.3*  --   --   --  36.4   PLATELETS 246  --  236  --  280  261  --   --   --  283   NEUTROS ABS 12.22*  --  14.95*  --  16.95*  --   --   --  14.50*   EOS ABS 0.00  --   --   --   --   --   --   --   --    MCV 88.3  --  92.6  --  88.5  86.7  --   --   --  86.9   CRP  --   --   --   --  39.50*  --   --   --   --    PROCALCITONIN  --   --   --   --  4.85*  --   --   --  6.09*   LACTATE  --  5.2* 5.8* 3.8* 2.1* 3.5*   < >  --  2.9*   D DIMER QUANT  --   --   --    --   --   --   --   --  1.69*   HSTROP T  --   --   --   --   --   --   --  8 11    < > = values in this interval not displayed.         Lab 01/17/25  0350 01/16/25  1751 01/16/25  1353 01/16/25  0536 01/15/25  1643   SODIUM 139  --  137 140 134*   POTASSIUM 4.6  --  3.5 3.8 4.2   CHLORIDE 108*  --  104 105 97*   CO2 21.0*  --  16.8* 25.2 22.2   ANION GAP 10.0  --  16.2* 9.8 14.8   BUN 15  --  18 19 24*   CREATININE 0.55*  --  0.75 0.72 1.05*   EGFR 103.1  --  89.6 94.1 59.8*   GLUCOSE 165*  --  335* 150* 161*   CALCIUM 8.4*  --  8.1* 8.3* 9.0   MAGNESIUM  --   --   --  2.0 2.1   PHOSPHORUS 3.0  --  1.6* 2.2*  --    HEMOGLOBIN A1C  --  5.90*  --   --   --          Lab 01/16/25  0536 01/15/25  1643   TOTAL PROTEIN 6.5 7.5   ALBUMIN 3.2* 3.8   GLOBULIN 3.3 3.7   ALT (SGPT) 13 13   AST (SGOT) 32 43*   BILIRUBIN 0.2 0.5   ALK PHOS 109 109         Lab 01/15/25  1807 01/15/25  1643   PROBNP  --  1,527.0*   HSTROP T 8 11                 Lab 01/16/25  1258   PH, ARTERIAL 7.407   PCO2, ARTERIAL 28.0*   PO2 ART 88.8   FIO2 28   HCO3 ART 17.6*   BASE EXCESS ART -6.0*   CARBOXYHEMOGLOBIN 0.9     Brief Urine Lab Results  (Last result in the past 365 days)        Color   Clarity   Blood   Leuk Est   Nitrite   Protein   CREAT   Urine HCG        01/15/25 1841 Yellow   Clear   Negative   Negative   Negative   Negative                   Microbiology Results Abnormal       Procedure Component Value - Date/Time    Respiratory Culture - Sputum, Cough [[id number]]  (Abnormal)  (Susceptibility) Collected: 01/16/25 1225    Lab Status: Final result Specimen: Sputum from Cough Updated: 01/18/25 0951     Respiratory Culture Heavy growth (4+) Streptococcus pneumoniae      No Normal Respiratory Christina     Gram Stain Many (4+) WBCs per low power field      Rare (1+) Epithelial cells per low power field      Many (4+) Gram positive cocci    Susceptibility        Streptococcus pneumoniae      JAMES      Ceftriaxone (Meningitis) Susceptible       Ceftriaxone (Non-meningitis) Susceptible      Levofloxacin Susceptible      Penicillin (Meningitis) Susceptible      Penicillin (Non-Meningitis) Susceptible                           S. Pneumo Ag Urine or CSF - Urine, Urine, Clean Catch [[id number]]  (Abnormal) Collected: 01/16/25 1144    Lab Status: Final result Specimen: Urine, Clean Catch Updated: 01/17/25 0054     Strep Pneumo Ag Positive    Legionella Antigen, Urine - Urine, Urine, Clean Catch [[id number]]  (Abnormal) Collected: 01/16/25 1144    Lab Status: Final result Specimen: Urine, Clean Catch Updated: 01/17/25 0040     LEGIONELLA ANTIGEN, URINE Positive    COVID PRE-OP / PRE-PROCEDURE SCREENING ORDER (NO ISOLATION) - Swab, Nasopharynx [[id number]]  (Abnormal) Collected: 01/15/25 1725    Lab Status: Final result Specimen: Swab from Nasopharynx Updated: 01/15/25 1829    Narrative:      The following orders were created for panel order COVID PRE-OP / PRE-PROCEDURE SCREENING ORDER (NO ISOLATION) - Swab, Nasopharynx.  Procedure                               Abnormality         Status                     ---------                               -----------         ------                     Respiratory Panel PCR w/...[[id number]]  Abnormal            Final result                 Please view results for these tests on the individual orders.    Respiratory Panel PCR w/COVID-19(SARS-CoV-2) ALEJANDRA/CONSTANCE/NITHIN/PAD/COR/SHAUN In-House, NP Swab in UTM/VTM, 2 HR TAT - Swab, Nasopharynx [[id number]]  (Abnormal) Collected: 01/15/25 1725    Lab Status: Final result Specimen: Swab from Nasopharynx Updated: 01/15/25 1829     ADENOVIRUS, PCR Not Detected     Coronavirus 229E Not Detected     Coronavirus HKU1 Not Detected     Coronavirus NL63 Not Detected     Coronavirus OC43 Not Detected     COVID19 Not Detected     Human Metapneumovirus Not Detected     Human Rhinovirus/Enterovirus Not Detected     Influenza A PCR Not Detected     Influenza A H1 Not Detected     Influenza A H1 2009 PCR Not  Detected     Influenza A H3 Detected     Influenza B PCR Not Detected     Parainfluenza Virus 1 Not Detected     Parainfluenza Virus 2 Not Detected     Parainfluenza Virus 3 Not Detected     Parainfluenza Virus 4 Not Detected     RSV, PCR Not Detected     Bordetella pertussis pcr Not Detected     Bordetella parapertussis PCR Not Detected     Chlamydophila pneumoniae PCR Not Detected     Mycoplasma pneumo by PCR Not Detected    Narrative:      In the setting of a positive respiratory panel with a viral infection PLUS a negative procalcitonin without other underlying concern for bacterial infection, consider observing off antibiotics or discontinuation of antibiotics and continue supportive care. If the respiratory panel is positive for atypical bacterial infection (Bordetella pertussis, Chlamydophila pneumoniae, or Mycoplasma pneumoniae), consider antibiotic de-escalation to target atypical bacterial infection.            CT Lower Extremity Left With Contrast    Result Date: 1/16/2025  CT LOWER EXTREMITY LEFT W CONTRAST Date of Exam: 1/16/2025 7:48 PM EST Indication: cellulitis. Comparison: None available. Technique: Axial CT images were obtained of the left lower extremity after the uneventful intravenous administration of 100 mL Isovue-300.  Reconstructed coronal and sagittal images were also obtained. Automated exposure control and iterative construction methods were used. Findings: Bones: No evidence of fracture. No osseous erosions. Degenerative changes of the hip and knee. Degenerative changes of the foot. Small knee joint effusion. Degenerative changes of the lower lumbar spine. Soft tissues: Visualized pelvic organs are unremarkable. Visualized muscles are unremarkable. Vessels appear grossly intact. No evidence of soft tissue mass. No fluid collections. Mild soft tissue swelling along the first metatarsophalangeal joint. No substantial subcutaneous edema. Contralateral extremity. No evidence of acute osseous  or soft tissue abnormality.     Impression: Impression: 1.No evidence of acute osseous or soft tissue abnormalities. Electronically Signed: Landen Ross MD  1/16/2025 8:37 PM EST  Workstation ID: [id number]         Current medications:  Scheduled Meds:[Held by provider] amLODIPine, 5 mg, Oral, Daily  cefTRIAXone, 1 g, Intravenous, Q24H  guaiFENesin, 1,200 mg, Oral, Q12H  heparin (porcine), 5,000 Units, Subcutaneous, Q8H  ipratropium-albuterol, 3 mL, Nebulization, Q6H While Awake - RT  [Held by provider] losartan, 100 mg, Oral, Daily  methylPREDNISolone sodium succinate, 60 mg, Intravenous, Q12H  oseltamivir, 75 mg, Oral, Q12H  pantoprazole, 40 mg, Oral, Q AM  sodium chloride, 10 mL, Intravenous, Q12H  sodium chloride, 10 mL, Intravenous, Q12H      Continuous Infusions:   PRN Meds:.  acetaminophen **OR** acetaminophen **OR** acetaminophen    Albuterol Sulfate NEB Orderable    senna-docusate sodium **AND** polyethylene glycol **AND** bisacodyl **AND** bisacodyl    Calcium Replacement - Follow Nurse / BPA Driven Protocol    guaiFENesin-codeine    Magnesium Standard Dose Replacement - Follow Nurse / BPA Driven Protocol    ondansetron    Phosphorus Replacement - Follow Nurse / BPA Driven Protocol    Potassium Replacement - Follow Nurse / BPA Driven Protocol    sodium chloride    sodium chloride    sodium chloride    sodium chloride    sodium chloride    Assessment & Plan   Assessment & Plan     Active Hospital Problems    Diagnosis  POA    **Pneumonia [J18.9]  Yes    PNA (pneumonia) [J18.9]  Yes    Sepsis due to pneumonia [J18.9, A41.9]  Unknown    Acute respiratory failure with hypoxia [J96.01]  Unknown    Influenza A [J10.1]  Yes      Resolved Hospital Problems   No resolved problems to display.        Brief Hospital Course to date:  Carlota Santana is a 64 y.o. Faroese speaking female with PMH of HTN and GERD presented to ED with complaints of SOA and weakness. States she began to feel ill one week ago with dry  cough. Progressed and began having a lot of fatigue, malaise and ROTHMAN. Can to ED for evaluation.  in room, states he recently had bronchitis. Patient denies fever, chills. In ED found to have multifocal PNA and Influenza A.      Sepsis   Po legionella pneumonia  Acute resp failure with hypoxia  Influenza A   --Flu +, legionella ag + and strep ag +. Tamiflu x 5 days, continue abx for 5 day course.  --Duonebs  --Solumedrol IV BID  --On room air. Says she needs 1 more day here to recover - likely home tomorrow.  --Needs work note at d/c.     HTN   --holding home BP meds  --restart when able     Expected Discharge Location and Transportation:   Expected Discharge   Expected Discharge Date: 1/18/2025; Expected Discharge Time:      VTE Prophylaxis:  Pharmacologic & mechanical VTE prophylaxis orders are present.         AM-PAC 6 Clicks Score (PT): 24 (01/18/25 0800)    CODE STATUS:   Code Status and Medical Interventions: CPR (Attempt to Resuscitate); Full Support   Ordered at: 01/16/25 2661     Level Of Support Discussed With:    Patient     Code Status (Patient has no pulse and is not breathing):    CPR (Attempt to Resuscitate)     Medical Interventions (Patient has pulse or is breathing):    Full Support       Juliane Ugalde II, DO  01/18/25

## 2025-01-18 NOTE — PLAN OF CARE
Goal Outcome Evaluation:   Patient alert and oriented x4. Afebrile. Weaned o2 off. Patient is on room air sating above 90%. Remains in droplet precautions r/t flu.  and daughter at bedside. Ambulating to bathroom independently.

## 2025-01-19 ENCOUNTER — READMISSION MANAGEMENT (OUTPATIENT)
Dept: CALL CENTER | Facility: HOSPITAL | Age: 65
End: 2025-01-19
Payer: COMMERCIAL

## 2025-01-19 VITALS
BODY MASS INDEX: 32.99 KG/M2 | TEMPERATURE: 98 F | RESPIRATION RATE: 16 BRPM | HEIGHT: 63 IN | WEIGHT: 186.2 LBS | OXYGEN SATURATION: 99 % | DIASTOLIC BLOOD PRESSURE: 84 MMHG | SYSTOLIC BLOOD PRESSURE: 151 MMHG | HEART RATE: 76 BPM

## 2025-01-19 PROBLEM — J96.01 ACUTE RESPIRATORY FAILURE WITH HYPOXIA: Status: RESOLVED | Noted: 2025-01-16 | Resolved: 2025-01-19

## 2025-01-19 PROBLEM — J18.9 PNA (PNEUMONIA): Status: RESOLVED | Noted: 2025-01-17 | Resolved: 2025-01-19

## 2025-01-19 PROBLEM — J18.9 PNEUMONIA: Status: RESOLVED | Noted: 2025-01-15 | Resolved: 2025-01-19

## 2025-01-19 PROBLEM — J18.9 SEPSIS DUE TO PNEUMONIA: Status: RESOLVED | Noted: 2025-01-16 | Resolved: 2025-01-19

## 2025-01-19 PROBLEM — A41.9 SEPSIS DUE TO PNEUMONIA: Status: RESOLVED | Noted: 2025-01-16 | Resolved: 2025-01-19

## 2025-01-19 PROBLEM — J10.1 INFLUENZA A: Status: RESOLVED | Noted: 2025-01-15 | Resolved: 2025-01-19

## 2025-01-19 PROCEDURE — 94799 UNLISTED PULMONARY SVC/PX: CPT

## 2025-01-19 PROCEDURE — 25010000002 HEPARIN (PORCINE) PER 1000 UNITS: Performed by: NURSE PRACTITIONER

## 2025-01-19 PROCEDURE — 94664 DEMO&/EVAL PT USE INHALER: CPT

## 2025-01-19 PROCEDURE — G0378 HOSPITAL OBSERVATION PER HR: HCPCS

## 2025-01-19 PROCEDURE — 99239 HOSP IP/OBS DSCHRG MGMT >30: CPT | Performed by: INTERNAL MEDICINE

## 2025-01-19 RX ORDER — OSELTAMIVIR PHOSPHATE 75 MG/1
75 CAPSULE ORAL EVERY 12 HOURS SCHEDULED
Qty: 2 CAPSULE | Refills: 0 | Status: SHIPPED | OUTPATIENT
Start: 2025-01-19 | End: 2025-01-20

## 2025-01-19 RX ORDER — CODEINE PHOSPHATE AND GUAIFENESIN 10; 100 MG/5ML; MG/5ML
5 SOLUTION ORAL 4 TIMES DAILY PRN
Qty: 118 ML | Refills: 0 | Status: SHIPPED | OUTPATIENT
Start: 2025-01-19

## 2025-01-19 RX ORDER — IPRATROPIUM BROMIDE AND ALBUTEROL SULFATE 2.5; .5 MG/3ML; MG/3ML
3 SOLUTION RESPIRATORY (INHALATION) EVERY 4 HOURS PRN
Status: DISCONTINUED | OUTPATIENT
Start: 2025-01-19 | End: 2025-01-19 | Stop reason: HOSPADM

## 2025-01-19 RX ORDER — CEFDINIR 300 MG/1
300 CAPSULE ORAL EVERY 12 HOURS SCHEDULED
Qty: 2 CAPSULE | Refills: 0 | Status: SHIPPED | OUTPATIENT
Start: 2025-01-19 | End: 2025-01-20

## 2025-01-19 RX ORDER — IPRATROPIUM BROMIDE AND ALBUTEROL SULFATE 2.5; .5 MG/3ML; MG/3ML
3 SOLUTION RESPIRATORY (INHALATION) EVERY 4 HOURS PRN
Qty: 360 ML | Refills: 0 | Status: SHIPPED | OUTPATIENT
Start: 2025-01-19

## 2025-01-19 RX ORDER — LOSARTAN POTASSIUM 100 MG/1
100 TABLET ORAL DAILY
Start: 2025-01-19

## 2025-01-19 RX ADMIN — GUAIFENESIN 1200 MG: 600 TABLET, EXTENDED RELEASE ORAL at 08:17

## 2025-01-19 RX ADMIN — PANTOPRAZOLE SODIUM 40 MG: 40 TABLET, DELAYED RELEASE ORAL at 05:17

## 2025-01-19 RX ADMIN — GUAIFENESIN AND CODEINE PHOSPHATE 5 ML: 100; 10 SOLUTION ORAL at 08:30

## 2025-01-19 RX ADMIN — IPRATROPIUM BROMIDE AND ALBUTEROL SULFATE 3 ML: 2.5; .5 SOLUTION RESPIRATORY (INHALATION) at 08:56

## 2025-01-19 RX ADMIN — Medication 10 ML: at 08:18

## 2025-01-19 RX ADMIN — AZITHROMYCIN 250 MG: 250 TABLET, FILM COATED ORAL at 08:17

## 2025-01-19 RX ADMIN — CEFDINIR 300 MG: 300 CAPSULE ORAL at 08:17

## 2025-01-19 RX ADMIN — OSELTAMIVIR PHOSPHATE 75 MG: 75 CAPSULE ORAL at 08:17

## 2025-01-19 RX ADMIN — HEPARIN SODIUM 5000 UNITS: 5000 INJECTION INTRAVENOUS; SUBCUTANEOUS at 05:17

## 2025-01-19 NOTE — PLAN OF CARE
Goal Outcome Evaluation:           Progress: improving        Patient discharged home with  and daughter. Educated patient and family on discharge instructions at bedside. Printed AVS in Filipino for them.

## 2025-01-19 NOTE — OUTREACH NOTE
Prep Survey      Flowsheet Row Responses   [facility] [place] patient discharged from? [place]   Is LACE score < 7 ? Yes   Eligibility [facility]   Date of Admission 01/15/25   Date of Discharge 01/19/25   Discharge Disposition Home or Self Care   Discharge diagnosis Pneumonia   Does the patient have one of the following disease processes/diagnoses(primary or secondary)? Pneumonia   Does the patient have Home health ordered? No   Is there a DME ordered? No   Prep survey completed? Yes            Luma SQUIRES - Registered Nurse

## 2025-01-19 NOTE — DISCHARGE SUMMARY
[facility] Medicine Services  DISCHARGE SUMMARY    Patient Name: Carlota Santana  : 1960  MRN: [id number]    Date of Admission: 1/15/2025  4:31 PM  Date of Discharge:  2025  Primary Care Physician: Rehan Ramirez APRN    Consults       No orders found from 2024 to 2025.            Hospital Course     Presenting Problem: PNA    Active Hospital Problems   No active problems to display.      Resolved Hospital Problems    Diagnosis Date Resolved POA    **Pneumonia [J18.9] 2025 Yes    PNA (pneumonia) [J18.9] 2025 Yes    Sepsis due to pneumonia [J18.9, A41.9] 2025 Yes    Acute respiratory failure with hypoxia [J96.01] 2025 Yes    Influenza A [J10.1] 2025 Yes          Hospital Course:  Carlota Santana is a 64 y.o. Kazakh speaking female with PMH of HTN and GERD presented to ED with complaints of SOA and weakness. States she began to feel ill one week ago with dry cough. Progressed and began having a lot of fatigue, malaise and ROTHMAN. Can to ED for evaluation.  in room, states he recently had bronchitis. Patient denies fever, chills. In ED found to have multifocal PNA and Influenza A.      Sepsis   Po legionella and streptococcal pneumonia  Acute resp failure with hypoxia  Influenza A   --Flu +, legionella ag + and strep ag +. Placed on supplemental o2 on arrival and started on tamiflu x 5 days, abx for 5 day course. Continue tamiflu + omnicef through today.  --Will remain off  work until released by PCP. To F/U later this week.      Discharge Follow Up Recommendations for outpatient labs/diagnostics:   PCP 4-5 days.     Day of Discharge     HPI: Up in chair.  at bedside. Feels much better. Breathing comfortably. Energy is improving.    Review of Systems  Gen- No fevers, chills  CV- No chest pain, palpitations  Resp- No cough, dyspnea  GI- No N/V/D, abd pain    Vital Signs:   Temp:  [97.7 °F (36.5 °C)-98.4 °F (36.9 °C)] 98 °F  (36.7 °C)  Heart Rate:  [] 76  Resp:  [15-18] 16  BP: (118-151)/(67-84) 151/84  Flow (L/min) (Oxygen Therapy):  [2] 2      Physical Exam:  Constitutional: No acute distress, awake, alert  HENT: NCAT, mucous membranes moist  Respiratory: Clear to auscultation bilaterally, respiratory effort normal   Cardiovascular: RRR, no murmurs, rubs, or gallops  Gastrointestinal: Positive bowel sounds, soft, nontender, nondistended  Musculoskeletal: No bilateral ankle edema  Psychiatric: Appropriate affect, cooperative  Neurologic: Oriented x 3, strength symmetric in all extremities, Cranial Nerves grossly intact to confrontation, speech clear  Skin: No rashes     Pertinent  and/or Most Recent Results     LAB RESULTS:      Lab 01/17/25  0350 01/16/25  1751 01/16/25  1353 01/16/25  0841 01/16/25  0536 01/16/25  0230 01/15/25  1946 01/15/25  1643   WBC 12.60*  --  16.99*  --  18.42*  18.05*  --   --  17.26*   HEMOGLOBIN 9.9*  --  9.9*  --  10.9*  10.8*  --   --  11.8*   HEMATOCRIT 30.8*  --  32.6*  --  34.7  33.3*  --   --  36.4   PLATELETS 246  --  236  --  280  261  --   --  283   NEUTROS ABS 12.22*  --  14.95*  --  16.95*  --   --  14.50*   EOS ABS 0.00  --   --   --   --   --   --   --    MCV 88.3  --  92.6  --  88.5  86.7  --   --  86.9   CRP  --   --   --   --  39.50*  --   --   --    PROCALCITONIN  --   --   --   --  4.85*  --   --  6.09*   LACTATE  --  5.2* 5.8* 3.8* 2.1* 3.5*   < > 2.9*   D DIMER QUANT  --   --   --   --   --   --   --  1.69*    < > = values in this interval not displayed.         Lab 01/17/25  0350 01/16/25  1751 01/16/25  1353 01/16/25  0536 01/15/25  1643   SODIUM 139  --  137 140 134*   POTASSIUM 4.6  --  3.5 3.8 4.2   CHLORIDE 108*  --  104 105 97*   CO2 21.0*  --  16.8* 25.2 22.2   ANION GAP 10.0  --  16.2* 9.8 14.8   BUN 15  --  18 19 24*   CREATININE 0.55*  --  0.75 0.72 1.05*   EGFR 103.1  --  89.6 94.1 59.8*   GLUCOSE 165*  --  335* 150* 161*   CALCIUM 8.4*  --  8.1* 8.3* 9.0    MAGNESIUM  --   --   --  2.0 2.1   PHOSPHORUS 3.0  --  1.6* 2.2*  --    HEMOGLOBIN A1C  --  5.90*  --   --   --          Lab 01/16/25  0536 01/15/25  1643   TOTAL PROTEIN 6.5 7.5   ALBUMIN 3.2* 3.8   GLOBULIN 3.3 3.7   ALT (SGPT) 13 13   AST (SGOT) 32 43*   BILIRUBIN 0.2 0.5   ALK PHOS 109 109         Lab 01/15/25  1807 01/15/25  1643   PROBNP  --  1,527.0*   HSTROP T 8 11                 Lab 01/16/25  1258   PH, ARTERIAL 7.407   PCO2, ARTERIAL 28.0*   PO2 ART 88.8   FIO2 28   HCO3 ART 17.6*   BASE EXCESS ART -6.0*   CARBOXYHEMOGLOBIN 0.9     Brief Urine Lab Results  (Last result in the past 365 days)        Color   Clarity   Blood   Leuk Est   Nitrite   Protein   CREAT   Urine HCG        01/15/25 1841 Yellow   Clear   Negative   Negative   Negative   Negative                 Microbiology Results (last 10 days)       Procedure Component Value - Date/Time    Respiratory Culture - Sputum, Cough [[id number]]  (Abnormal)  (Susceptibility) Collected: 01/16/25 1225    Lab Status: Final result Specimen: Sputum from Cough Updated: 01/18/25 0958     Respiratory Culture Heavy growth (4+) Streptococcus pneumoniae      No Normal Respiratory Christina     Gram Stain Many (4+) WBCs per low power field      Rare (1+) Epithelial cells per low power field      Many (4+) Gram positive cocci    Susceptibility        Streptococcus pneumoniae      JAMES      Ceftriaxone (Meningitis) Susceptible      Ceftriaxone (Non-meningitis) Susceptible      Levofloxacin Susceptible      Penicillin (Meningitis) Susceptible      Penicillin (Non-Meningitis) Susceptible                           Legionella Antigen, Urine - Urine, Urine, Clean Catch [[id number]]  (Abnormal) Collected: 01/16/25 1144    Lab Status: Final result Specimen: Urine, Clean Catch Updated: 01/17/25 0040     LEGIONELLA ANTIGEN, URINE Positive    S. Pneumo Ag Urine or CSF - Urine, Urine, Clean Catch [[id number]]  (Abnormal) Collected: 01/16/25 1144    Lab Status: Final result Specimen:  Urine, Clean Catch Updated: 01/17/25 0054     Strep Pneumo Ag Positive    MRSA Screen, PCR (Inpatient) - Swab, Nares [[id number]]  (Normal) Collected: 01/16/25 0846    Lab Status: Final result Specimen: Swab from Nares Updated: 01/16/25 1330     MRSA PCR Negative    Narrative:      The negative predictive value of this diagnostic test is high and should only be used to consider de-escalating anti-MRSA therapy. A positive result may indicate colonization with MRSA and must be correlated clinically.  MRSA Negative    Blood Culture - Blood, Arm, Right [[id number]]  (Normal) Collected: 01/15/25 1800    Lab Status: Preliminary result Specimen: Blood from Arm, Right Updated: 01/18/25 2315     Blood Culture No growth at 3 days    Blood Culture - Blood, Arm, Left [[id number]]  (Normal) Collected: 01/15/25 1750    Lab Status: Preliminary result Specimen: Blood from Arm, Left Updated: 01/18/25 2315     Blood Culture No growth at 3 days    COVID PRE-OP / PRE-PROCEDURE SCREENING ORDER (NO ISOLATION) - Swab, Nasopharynx [[id number]]  (Abnormal) Collected: 01/15/25 1725    Lab Status: Final result Specimen: Swab from Nasopharynx Updated: 01/15/25 1829    Narrative:      The following orders were created for panel order COVID PRE-OP / PRE-PROCEDURE SCREENING ORDER (NO ISOLATION) - Swab, Nasopharynx.  Procedure                               Abnormality         Status                     ---------                               -----------         ------                     Respiratory Panel PCR w/...[[id number]]  Abnormal            Final result                 Please view results for these tests on the individual orders.    Respiratory Panel PCR w/COVID-19(SARS-CoV-2) ALEJANDRA/[facility]/NITHIN/PAD/COR/SHAUN In-House, NP Swab in UTM/VTM, 2 HR TAT - Swab, Nasopharynx [[id number]]  (Abnormal) Collected: 01/15/25 1725    Lab Status: Final result Specimen: Swab from Nasopharynx Updated: 01/15/25 1829     ADENOVIRUS, PCR Not Detected     Coronavirus 229E Not  Detected     Coronavirus HKU1 Not Detected     Coronavirus NL63 Not Detected     Coronavirus OC43 Not Detected     COVID19 Not Detected     Human Metapneumovirus Not Detected     Human Rhinovirus/Enterovirus Not Detected     Influenza A PCR Not Detected     Influenza A H1 Not Detected     Influenza A H1 2009 PCR Not Detected     Influenza A H3 Detected     Influenza B PCR Not Detected     Parainfluenza Virus 1 Not Detected     Parainfluenza Virus 2 Not Detected     Parainfluenza Virus 3 Not Detected     Parainfluenza Virus 4 Not Detected     RSV, PCR Not Detected     Bordetella pertussis pcr Not Detected     Bordetella parapertussis PCR Not Detected     Chlamydophila pneumoniae PCR Not Detected     Mycoplasma pneumo by PCR Not Detected    Narrative:      In the setting of a positive respiratory panel with a viral infection PLUS a negative procalcitonin without other underlying concern for bacterial infection, consider observing off antibiotics or discontinuation of antibiotics and continue supportive care. If the respiratory panel is positive for atypical bacterial infection (Bordetella pertussis, Chlamydophila pneumoniae, or Mycoplasma pneumoniae), consider antibiotic de-escalation to target atypical bacterial infection.            CT Lower Extremity Left With Contrast    Result Date: 1/16/2025  CT LOWER EXTREMITY LEFT W CONTRAST Date of Exam: 1/16/2025 7:48 PM EST Indication: cellulitis. Comparison: None available. Technique: Axial CT images were obtained of the left lower extremity after the uneventful intravenous administration of 100 mL Isovue-300.  Reconstructed coronal and sagittal images were also obtained. Automated exposure control and iterative construction methods were used. Findings: Bones: No evidence of fracture. No osseous erosions. Degenerative changes of the hip and knee. Degenerative changes of the foot. Small knee joint effusion. Degenerative changes of the lower lumbar spine. Soft tissues:  Visualized pelvic organs are unremarkable. Visualized muscles are unremarkable. Vessels appear grossly intact. No evidence of soft tissue mass. No fluid collections. Mild soft tissue swelling along the first metatarsophalangeal joint. No substantial subcutaneous edema. Contralateral extremity. No evidence of acute osseous or soft tissue abnormality.     Impression: 1.No evidence of acute osseous or soft tissue abnormalities. Electronically Signed: Landen Ross MD  1/16/2025 8:37 PM EST  Workstation ID: [id number]    XR Chest 1 View    Result Date: 1/16/2025  XR CHEST 1 VW Date of Exam: 1/16/2025 5:55 AM EST Indication: pneumonia Comparison: 1/15/2025 and chest CT same date. Findings: There is patchy consolidated pneumonia in the perihilar lung bilaterally, left greater than right. No pneumothorax or pleural effusion. Heart size and pulmonary vasculature appear within normal limits.     Impression: Bilateral perihilar pneumonia. Electronically Signed: Ayo Rodrigues MD  1/16/2025 6:11 AM EST  Workstation ID: [id number]    CT Angiogram Chest Pulmonary Embolism    Result Date: 1/15/2025  CT ANGIOGRAM CHEST PULMONARY EMBOLISM Date of Exam: 1/15/2025 6:10 PM EST Indication: dyspnea, elevated d dimer. Comparison: Same day chest radiograph Technique: Axial CT images were obtained of the chest after the uneventful intravenous administration of 75 mL Isovue-370 utilizing pulmonary embolism protocol.  In addition, a 3-D volume rendered image was created for interpretation.  Reconstructed coronal and sagittal images were also obtained. Automated exposure control and iterative construction methods were used. Findings: Diagnostic quality: Contrast opacification of the pulmonary arterial circulation is adequate for assessment of pulmonary embolism. Study is significantly limited by respiratory motion artifact. Pulmonary arteries: No evidence of pulmonary embolus. Main pulmonary artery is borderline enlarged measuring 3 cm. Heart  and pericardium:No flattening of the interventricular septum. Coronary artery calcification is seen. No substantial pericardial effusion. Vessels:Normal caliber aorta. Atherosclerotic calcification of the aorta. No evidence of acute aortic injury. Venous structures including the superior vena cava and inferior vena cava appear grossly patent. Mediastinum: Small hiatal hernia. Unremarkable appearance of the esophagus. No enlarged or suspicious mediastinal or hilar lymphadenopathy. No anterior mediastinal masses. Lower neck:No suspicious or enlarged supraclavicular or axillary lymphadenopathy. Normal appearance of the thyroid. Pulmonary parenchyma: Consolidation seen predominantly in the left lower lobe and right middle lobe. Few consolidative opacity seen in the left upper lobe as well. No suspicious pulmonary nodules. Pleura:No evidence of pleural effusion or pneumothorax. Airways:Central and segmental airways are clear. Chest wall and bones:No acute osseous abnormality. No substantial degenerative changes of thoracic spine. Unremarkable appearance of soft tissues. Upper abdomen:No lesion seen within the visualized liver. Postsurgical changes of the stomach. Distended gallbladder without evidence of cholecystitis.     Impression: 1.Significantly limited examination due to respiratory motion artifact. 2.No evidence of central occlusive pulmonary embolus given these limitations. 3.Multifocal consolidative opacities predominantly in the left lower lobe and right middle lobe. This is most consistent with multifocal pneumonia. 4.Borderline enlarged main pulmonary artery which can be seen in the setting of pulmonary hypertension. Electronically Signed: Landen Ross MD  1/15/2025 7:08 PM EST  Workstation ID: [id number]    XR Chest 1 View    Result Date: 1/15/2025  XR CHEST 1 VW Date of Exam: 1/15/2025 4:34 PM EST Indication: Weak/Dizzy/AMS triage protocol Comparison: None available. Findings: Cardiomediastinal  silhouette is within normal limits. Perihilar opacities. No pleural effusion or pneumothorax. No evidence of acute osseous abnormalities. Visualized upper abdomen is unremarkable.     Impression: Perihilar opacities which may reflect infection or edema. Electronically Signed: Landen Ross MD  1/15/2025 5:37 PM EST  Workstation ID: [id number]                 Plan for Follow-up of Pending Labs/Results:   Pending Labs       Order Current Status    Blood Culture - Blood, Arm, Left Preliminary result    Blood Culture - Blood, Arm, Right Preliminary result          Discharge Details        Discharge Medications        New Medications        Instructions Start Date   cefdinir 300 MG capsule  Commonly known as: OMNICEF   300 mg, Oral, Every 12 Hours Scheduled      guaiFENesin-codeine 100-10 MG/5ML solution/syrup  Commonly known as: ROMILAR-AC   5 mL, Oral, 4 Times Daily PRN      ipratropium-albuterol 0.5-2.5 mg/3 ml nebulizer  Commonly known as: DUO-NEB   3 mL, Nebulization, Every 4 Hours PRN, J96.0      oseltamivir 75 MG capsule  Commonly known as: TAMIFLU   75 mg, Oral, Every 12 Hours Scheduled             Continue These Medications        Instructions Start Date   amLODIPine 5 MG tablet  Commonly known as: NORVASC   5 mg, Oral, Daily      Buffered Vitamin C 1000 MG capsule   Oral      fluticasone 50 MCG/ACT nasal spray  Commonly known as: Flonase   2 sprays, Nasal, Daily      losartan 100 MG tablet  Commonly known as: COZAAR   100 mg, Oral, Daily      Magnesium 250 MG tablet   Oral      meloxicam 7.5 MG tablet  Commonly known as: MOBIC   7.5 mg, Oral, Daily      MULTIVITAMIN ADULT EXTRA C PO   Oral      multivitamin tablet tablet   Oral      omeprazole 40 MG capsule  Commonly known as: priLOSEC   40 mg, Oral, Daily      Vitamin D3 50 MCG (2000 UT) capsule   2,000 Units, Oral, Daily      vitamin E 400 UNIT capsule   400 Units, Oral, Daily               No Known Allergies      Discharge Disposition:  Home or Self  Care    Diet:  Hospital:  Diet Order   Procedures    Diet: Cardiac; Healthy Heart (2-3 Na+); Fluid Consistency: Thin (IDDSI 0)            Activity:  Activity Instructions       Work Restrictions      Type of Restriction: Work    May Return to Work: In 1 Week    With / Without Restrictions: Without Restrictions            Restrictions or Other Recommendations:         CODE STATUS:    Code Status and Medical Interventions: CPR (Attempt to Resuscitate); Full Support   Ordered at: 01/16/25 8722     Level Of Support Discussed With:    Patient     Code Status (Patient has no pulse and is not breathing):    CPR (Attempt to Resuscitate)     Medical Interventions (Patient has pulse or is breathing):    Full Support       Future Appointments   Date Time Provider Department Center   4/25/2025  2:45 PM Rehan Ramirez APRN [facility]       Additional Instructions for the Follow-ups that You Need to Schedule       Discharge Follow-up with PCP   As directed       Currently Documented PCP:    Rehan Ramirez APRN    PCP Phone Number:    [phone]     Follow Up Details: 4-5 day hospital follow up                      Juliane Ugalde II,   01/19/25      Time Spent on Discharge:  I spent  33  minutes on this discharge activity which included: face-to-face encounter with the patient, reviewing the data in the system, coordination of the care with the nursing staff as well as consultants, documentation, and entering orders.

## 2025-01-20 ENCOUNTER — TRANSITIONAL CARE MANAGEMENT TELEPHONE ENCOUNTER (OUTPATIENT)
Dept: CALL CENTER | Facility: HOSPITAL | Age: 65
End: 2025-01-20
Payer: COMMERCIAL

## 2025-01-20 LAB
BACTERIA SPEC AEROBE CULT: NORMAL
BACTERIA SPEC AEROBE CULT: NORMAL

## 2025-01-20 NOTE — OUTREACH NOTE
Call Center TCM Note      Flowsheet Row Responses   [facility] patient discharged from? [place]   Does the patient have one of the following disease processes/diagnoses(primary or secondary)? Pneumonia   TCM attempt successful? No   Unsuccessful attempts Attempt 1   Call Status Comments [id number]- ., Attempted patient and spouse.   Discharge diagnosis Pneumonia   If primary language is not English what is the name and relationship or agency of  used? [phone]- .            Bouchra Olvera RN    1/20/2025, 15:31 EST

## 2025-01-20 NOTE — OUTREACH NOTE
Call Center TCM Note      Flowsheet Row Responses   [facility] patient discharged from? [place]   Does the patient have one of the following disease processes/diagnoses(primary or secondary)? Pneumonia   TCM attempt successful? No   Unsuccessful attempts Attempt 2   Call Status Comments ., Attempted patient and spouse.            Bouchra Olvera RN    1/20/2025, 15:54 EST

## 2025-01-21 ENCOUNTER — TELEPHONE (OUTPATIENT)
Age: 65
End: 2025-01-21
Payer: COMMERCIAL

## 2025-01-21 ENCOUNTER — TRANSITIONAL CARE MANAGEMENT TELEPHONE ENCOUNTER (OUTPATIENT)
Dept: CALL CENTER | Facility: HOSPITAL | Age: 65
End: 2025-01-21
Payer: COMMERCIAL

## 2025-01-21 DIAGNOSIS — J18.9 PNEUMONIA DUE TO INFECTIOUS ORGANISM, UNSPECIFIED LATERALITY, UNSPECIFIED PART OF LUNG: Primary | ICD-10-CM

## 2025-01-21 NOTE — OUTREACH NOTE
Call Center TCM Note      Flowsheet Row Responses   [facility] [place] patient discharged from? [place]   Does the patient have one of the following disease processes/diagnoses(primary or secondary)? Pneumonia   TCM attempt successful? Yes   Call start time 0859   Call end time 0913   Discharge diagnosis Pneumonia   If primary language is not English what is the name and relationship or agency of  used? [facility]  #[id number]   Person spoke with today (if not patient) and relationship Patient   Meds reviewed with patient/caregiver? Yes   Does the patient have all medications ordered at discharge? Yes   Is the patient taking all medications as directed (includes completed medication regime)? No   What is preventing the patient from taking all medications as directed? Other  [Patient did not get an nebulizer machine, so she cannot do her breathing treatments.]   Nursing Interventions --  [High priority message sent to hospital .]   Comments PCP Rehan MEDEIROS. Hospital follow up appt in place for 1/22  2pm.   Does the patient have an appointment with their PCP within 7-14 days of discharge? Yes   Has home health visited the patient within 72 hours of discharge? N/A   Has all DME been delivered? No   Pulse Ox monitoring None   Psychosocial issues? No   Notified Case Management DME   Did the patient receive a copy of their discharge instructions? Yes   Nursing interventions Reviewed instructions with patient   What is the patient's perception of their health status since discharge? Improving  [Patient reports that she still has a cough, but improving.]   If the patient is a current smoker, are they able to teach back resources for cessation? Not a smoker   Is the patient/caregiver able to teach back the hierarchy of who to call/visit for symptoms/problems? PCP, Specialist, Home health nurse, Urgent Care, ED, 911 Yes   Is the patient/caregiver able to teach back signs and symptoms of  worsening condition: Fever/chills, Shortness of breath, Chest pain   Is the patient/caregiver able to teach back importance of completing antibiotic course of treatment? Yes   TCM call completed? Yes   Call end time 0913   Would this patient benefit from a Referral to [facility] Social Work? No   Is the patient interested in additional calls from an ambulatory ? No            Prachi Hinds RN    1/21/2025, 09:16 EST

## 2025-01-21 NOTE — TELEPHONE ENCOUNTER
Called through  services- belkis Aguilar ID number [id number] --   Called in regards to home nebulizer order on where to send- patient did not have a preference- will send order to [facility] - supplied patient with [facility] phone number

## 2025-01-21 NOTE — OUTREACH NOTE
Case Management Call Center Follow-up      Flowsheet Row Responses   [facility] Call Center Tracking Reason? No DME   Has the Call Center Case Management Follow-up issue been resolved? Yes   Follow-up Comments SW completed review of request.  Pt's PCP office has ordered the nebulizer machine through [facility].  Pt was notified of the DME being ordered.            JOCELYNN Montes De Oca    1/21/2025, 12:58 EST

## 2025-01-22 ENCOUNTER — OFFICE VISIT (OUTPATIENT)
Age: 65
End: 2025-01-22
Payer: COMMERCIAL

## 2025-01-22 ENCOUNTER — LAB (OUTPATIENT)
Age: 65
End: 2025-01-22
Payer: COMMERCIAL

## 2025-01-22 VITALS
HEIGHT: 63 IN | WEIGHT: 189 LBS | DIASTOLIC BLOOD PRESSURE: 62 MMHG | BODY MASS INDEX: 33.49 KG/M2 | SYSTOLIC BLOOD PRESSURE: 128 MMHG | HEART RATE: 88 BPM | OXYGEN SATURATION: 96 % | RESPIRATION RATE: 20 BRPM

## 2025-01-22 DIAGNOSIS — J18.9 PNEUMONIA DUE TO INFECTIOUS ORGANISM, UNSPECIFIED LATERALITY, UNSPECIFIED PART OF LUNG: Primary | ICD-10-CM

## 2025-01-22 DIAGNOSIS — I10 ESSENTIAL HYPERTENSION: ICD-10-CM

## 2025-01-22 DIAGNOSIS — E83.39 ACQUIRED HYPOPHOSPHATEMIA: ICD-10-CM

## 2025-01-22 DIAGNOSIS — E66.811 CLASS 1 OBESITY DUE TO EXCESS CALORIES WITH SERIOUS COMORBIDITY AND BODY MASS INDEX (BMI) OF 33.0 TO 33.9 IN ADULT: ICD-10-CM

## 2025-01-22 DIAGNOSIS — E66.09 CLASS 1 OBESITY DUE TO EXCESS CALORIES WITH SERIOUS COMORBIDITY AND BODY MASS INDEX (BMI) OF 33.0 TO 33.9 IN ADULT: ICD-10-CM

## 2025-01-22 DIAGNOSIS — R73.03 PREDIABETES: ICD-10-CM

## 2025-01-22 DIAGNOSIS — D64.9 ANEMIA, UNSPECIFIED TYPE: ICD-10-CM

## 2025-01-22 LAB
BASOPHILS # BLD AUTO: 0.04 10*3/MM3 (ref 0–0.2)
BASOPHILS NFR BLD AUTO: 0.4 % (ref 0–1.5)
DEPRECATED RDW RBC AUTO: 45.8 FL (ref 37–54)
EOSINOPHIL # BLD AUTO: 0.35 10*3/MM3 (ref 0–0.4)
EOSINOPHIL NFR BLD AUTO: 3.1 % (ref 0.3–6.2)
ERYTHROCYTE [DISTWIDTH] IN BLOOD BY AUTOMATED COUNT: 14.5 % (ref 12.3–15.4)
HCT VFR BLD AUTO: 38.8 % (ref 34–46.6)
HGB BLD-MCNC: 12.7 G/DL (ref 12–15.9)
IMM GRANULOCYTES # BLD AUTO: 0.35 10*3/MM3 (ref 0–0.05)
IMM GRANULOCYTES NFR BLD AUTO: 3.1 % (ref 0–0.5)
LYMPHOCYTES # BLD AUTO: 1.36 10*3/MM3 (ref 0.7–3.1)
LYMPHOCYTES NFR BLD AUTO: 12 % (ref 19.6–45.3)
MCH RBC QN AUTO: 28.7 PG (ref 26.6–33)
MCHC RBC AUTO-ENTMCNC: 32.7 G/DL (ref 31.5–35.7)
MCV RBC AUTO: 87.6 FL (ref 79–97)
MONOCYTES # BLD AUTO: 0.44 10*3/MM3 (ref 0.1–0.9)
MONOCYTES NFR BLD AUTO: 3.9 % (ref 5–12)
NEUTROPHILS NFR BLD AUTO: 77.5 % (ref 42.7–76)
NEUTROPHILS NFR BLD AUTO: 8.75 10*3/MM3 (ref 1.7–7)
NRBC BLD AUTO-RTO: 0 /100 WBC (ref 0–0.2)
PLATELET # BLD AUTO: 503 10*3/MM3 (ref 140–450)
PMV BLD AUTO: 10.3 FL (ref 6–12)
RBC # BLD AUTO: 4.43 10*6/MM3 (ref 3.77–5.28)
WBC NRBC COR # BLD AUTO: 11.29 10*3/MM3 (ref 3.4–10.8)

## 2025-01-22 PROCEDURE — 99495 TRANSJ CARE MGMT MOD F2F 14D: CPT | Performed by: NURSE PRACTITIONER

## 2025-01-22 PROCEDURE — 85025 COMPLETE CBC W/AUTO DIFF WBC: CPT | Performed by: NURSE PRACTITIONER

## 2025-01-22 PROCEDURE — 36415 COLL VENOUS BLD VENIPUNCTURE: CPT | Performed by: NURSE PRACTITIONER

## 2025-01-22 PROCEDURE — 80053 COMPREHEN METABOLIC PANEL: CPT | Performed by: NURSE PRACTITIONER

## 2025-01-22 PROCEDURE — 84100 ASSAY OF PHOSPHORUS: CPT | Performed by: NURSE PRACTITIONER

## 2025-01-22 NOTE — PROGRESS NOTES
Chief Complaint   Patient presents with    Transitional Care Management     Multifocal pneumonia       Subjective   Carlota Santana is a 64 y.o. female    History of Present Illness  Patient Name: Carlota Santana  : 1960  MRN: [id number]     Date of Admission: 1/15/2025  4:31 PM  Date of Discharge:  2025  Primary Care Physician: Rehan Ramirez APRN     Consults         No orders found from 2024 to 2025.                Hospital Course      Presenting Problem: PNA           Active Hospital Problems   No active problems to display.       Resolved Hospital Problems     Diagnosis Date Resolved POA    **Pneumonia [J18.9] 2025 Yes    PNA (pneumonia) [J18.9] 2025 Yes    Sepsis due to pneumonia [J18.9, A41.9] 2025 Yes    Acute respiratory failure with hypoxia [J96.01] 2025 Yes    Influenza A [J10.1] 2025 Yes            Hospital Course:  Carlota Santana is a 64 y.o. Amharic speaking female with PMH of HTN and GERD presented to ED with complaints of SOA and weakness. States she began to feel ill one week ago with dry cough. Progressed and began having a lot of fatigue, malaise and ROTHMAN. Can to ED for evaluation.  in room, states he recently had bronchitis. Patient denies fever, chills. In ED found to have multifocal PNA and Influenza A.      Sepsis   Po legionella and streptococcal pneumonia  Acute resp failure with hypoxia  Influenza A   --Flu +, legionella ag + and strep ag +. Placed on supplemental o2 on arrival and started on tamiflu x 5 days, abx for 5 day course. Continue tamiflu + omnicef through today.  --Will remain off  work until released by PCP. To F/U later this week.        Discharge Follow Up Recommendations for outpatient labs/diagnostics:   PCP 4-5 days.     2025-patient does speak [place] and her  is acting as .  Patient to follow-up from hospital visit as above.  She does reports she still has dyspnea on exertion as well  as has a cough.  She reports the inhalers do help her cough.    No Known Allergies  Past Medical History:   Diagnosis Date    Arthritis 2024    Hypertension       Past Surgical History:   Procedure Laterality Date     SECTION  1989    GASTRIC BYPASS       Social History     Socioeconomic History    Marital status:    Tobacco Use    Smoking status: Never     Passive exposure: Never    Smokeless tobacco: Never   Vaping Use    Vaping status: Never Used   Substance and Sexual Activity    Alcohol use: Yes     Alcohol/week: 3.0 standard drinks of alcohol     Types: 3 Glasses of wine per week     Comment: 3 weekly    Drug use: Never    Sexual activity: Not Currently     Partners: Male     Birth control/protection: Post-menopausal, Partner of same sex, Surgical        Current Outpatient Medications:     amLODIPine (NORVASC) 5 MG tablet, Take 1 tablet by mouth Daily., Disp: 90 tablet, Rfl: 3    Ascorbic Acid Buffered (BUFFERED VITAMIN C) 1000 MG capsule, Take  by mouth., Disp: , Rfl:     Cholecalciferol (VITAMIN D3) 50 MCG ( UT) capsule, Take 1 capsule by mouth Daily., Disp: , Rfl:     fluticasone (Flonase) 50 MCG/ACT nasal spray, 2 sprays into the nostril(s) as directed by provider Daily., Disp: 18.2 mL, Rfl: 11    guaiFENesin-codeine (ROMILAR-AC) 100-10 MG/5ML solution/syrup, Take 5 mL by mouth 4 (Four) Times a Day As Needed for Cough., Disp: 118 mL, Rfl: 0    ipratropium-albuterol (DUO-NEB) 0.5-2.5 mg/3 ml nebulizer, Take 3 mL by nebulization Every 4 (Four) Hours As Needed for Shortness of Air. J96.0, Disp: 360 mL, Rfl: 0    losartan (COZAAR) 100 MG tablet, Take 1 tablet by mouth Daily., Disp: , Rfl:     Magnesium 250 MG tablet, Take  by mouth., Disp: , Rfl:     meloxicam (MOBIC) 7.5 MG tablet, Take 1 tablet by mouth Daily., Disp: 90 tablet, Rfl: 3    Multiple Vitamins-Minerals (MULTIVITAMIN ADULT EXTRA C PO), Take  by mouth., Disp: , Rfl:     multivitamin (THERAGRAN) tablet tablet, Take  by  mouth., Disp: , Rfl:     omeprazole (priLOSEC) 40 MG capsule, Take 1 capsule by mouth Daily., Disp: 90 capsule, Rfl: 3    vitamin E 400 UNIT capsule, Take 1 capsule by mouth Daily., Disp: , Rfl:      Review of Systems   Constitutional:  Positive for fatigue. Negative for appetite change, diaphoresis and unexpected weight change.   Eyes:  Negative for visual disturbance.   Respiratory:  Positive for shortness of breath. Negative for cough, chest tightness and wheezing.    Cardiovascular:  Negative for chest pain, palpitations and leg swelling.   Gastrointestinal:  Negative for constipation, diarrhea, nausea and vomiting.   Endocrine: Negative for polydipsia, polyphagia and polyuria.   Genitourinary:  Negative for difficulty urinating and dysuria.   Skin:  Negative for color change.   Neurological:  Negative for dizziness, weakness, light-headedness and numbness.   Psychiatric/Behavioral:  Negative for sleep disturbance.        Objective     Vitals:    01/22/25 1409   BP: 128/62   Pulse: 88   Resp: 20   SpO2: 96%         01/22/25  1409   Weight: 85.7 kg (189 lb)     Body mass index is 33.49 kg/m².  Results for orders placed or performed during the hospital encounter of 01/15/25   ECG 12 Lead ED Triage Standing Order; Weak / Dizzy / AMS    Collection Time: 01/15/25  4:37 PM   Result Value Ref Range    QT Interval 330 ms    QTC Interval 408 ms   Comprehensive Metabolic Panel    Collection Time: 01/15/25  4:43 PM    Specimen: Blood   Result Value Ref Range    Glucose 161 (H) 65 - 99 mg/dL    BUN 24 (H) 8 - 23 mg/dL    Creatinine 1.05 (H) 0.57 - 1.00 mg/dL    Sodium 134 (L) 136 - 145 mmol/L    Potassium 4.2 3.5 - 5.2 mmol/L    Chloride 97 (L) 98 - 107 mmol/L    CO2 22.2 22.0 - 29.0 mmol/L    Calcium 9.0 8.6 - 10.5 mg/dL    Total Protein 7.5 6.0 - 8.5 g/dL    Albumin 3.8 3.5 - 5.2 g/dL    ALT (SGPT) 13 1 - 33 U/L    AST (SGOT) 43 (H) 1 - 32 U/L    Alkaline Phosphatase 109 39 - 117 U/L    Total Bilirubin 0.5 0.0 - 1.2 mg/dL     Globulin 3.7 gm/dL    A/G Ratio 1.0 g/dL    BUN/Creatinine Ratio 22.9 7.0 - 25.0    Anion Gap 14.8 5.0 - 15.0 mmol/L    eGFR 59.8 (L) >60.0 mL/min/1.73   High Sensitivity Troponin T    Collection Time: 01/15/25  4:43 PM    Specimen: Blood   Result Value Ref Range    HS Troponin T 11 <14 ng/L   Magnesium    Collection Time: 01/15/25  4:43 PM    Specimen: Blood   Result Value Ref Range    Magnesium 2.1 1.6 - 2.4 mg/dL   CBC Auto Differential    Collection Time: 01/15/25  4:43 PM    Specimen: Blood   Result Value Ref Range    WBC 17.26 (H) 3.40 - 10.80 10*3/mm3    RBC 4.19 3.77 - 5.28 10*6/mm3    Hemoglobin 11.8 (L) 12.0 - 15.9 g/dL    Hematocrit 36.4 34.0 - 46.6 %    MCV 86.9 79.0 - 97.0 fL    MCH 28.2 26.6 - 33.0 pg    MCHC 32.4 31.5 - 35.7 g/dL    RDW 15.1 12.3 - 15.4 %    RDW-SD 48.7 37.0 - 54.0 fl    MPV 9.8 6.0 - 12.0 fL    Platelets 283 140 - 450 10*3/mm3   BNP    Collection Time: 01/15/25  4:43 PM    Specimen: Blood   Result Value Ref Range    proBNP 1,527.0 (H) 0.0 - 900.0 pg/mL   D-dimer, Quantitative    Collection Time: 01/15/25  4:43 PM    Specimen: Blood   Result Value Ref Range    D-Dimer, Quantitative 1.69 (H) 0.00 - 0.63 MCGFEU/mL   Manual Differential    Collection Time: 01/15/25  4:43 PM    Specimen: Blood   Result Value Ref Range    Neutrophil % 49.0 42.7 - 76.0 %    Lymphocyte % 5.0 (L) 19.6 - 45.3 %    Monocyte % 4.0 (L) 5.0 - 12.0 %    Bands %  35.0 (H) 0.0 - 5.0 %    Metamyelocyte % 7.0 (H) 0.0 - 0.0 %    Neutrophils Absolute 14.50 (H) 1.70 - 7.00 10*3/mm3    Lymphocytes Absolute 0.86 0.70 - 3.10 10*3/mm3    Monocytes Absolute 0.69 0.10 - 0.90 10*3/mm3    Elliptocytes Slight/1+ None Seen    WBC Morphology Normal Normal    Platelet Morphology Normal Normal   Lactic Acid, Plasma    Collection Time: 01/15/25  4:43 PM    Specimen: Blood   Result Value Ref Range    Lactate 2.9 (C) 0.5 - 2.0 mmol/L   Procalcitonin    Collection Time: 01/15/25  4:43 PM    Specimen: Blood   Result Value Ref Range     Procalcitonin 6.09 (H) 0.00 - 0.25 ng/mL   Green Top (Gel)    Collection Time: 01/15/25  4:43 PM   Result Value Ref Range    Extra Tube Hold for add-ons.    Lavender Top    Collection Time: 01/15/25  4:43 PM   Result Value Ref Range    Extra Tube hold for add-on    Gold Top - SST    Collection Time: 01/15/25  4:43 PM   Result Value Ref Range    Extra Tube Hold for add-ons.    Gray Top    Collection Time: 01/15/25  4:43 PM   Result Value Ref Range    Extra Tube Hold for add-ons.    Light Blue Top    Collection Time: 01/15/25  4:43 PM   Result Value Ref Range    Extra Tube Hold for add-ons.    Respiratory Panel PCR w/COVID-19(SARS-CoV-2) ALEJANDRA/CONSTANCE/NITHIN/PAD/COR/SHAUN In-House, NP Swab in [place], 2 HR TAT - Swab, Nasopharynx    Collection Time: 01/15/25  5:25 PM    Specimen: Nasopharynx; Swab   Result Value Ref Range    ADENOVIRUS, PCR Not Detected Not Detected    Coronavirus 229E Not Detected Not Detected    Coronavirus HKU1 Not Detected Not Detected    Coronavirus NL63 Not Detected Not Detected    Coronavirus OC43 Not Detected Not Detected    COVID19 Not Detected Not Detected - Ref. Range    Human Metapneumovirus Not Detected Not Detected    Human Rhinovirus/Enterovirus Not Detected Not Detected    Influenza A PCR Not Detected Not Detected    Influenza A H1 Not Detected Not Detected    Influenza A H1 2009 PCR Not Detected Not Detected    Influenza A H3 Detected (A) Not Detected    Influenza B PCR Not Detected Not Detected    Parainfluenza Virus 1 Not Detected Not Detected    Parainfluenza Virus 2 Not Detected Not Detected    Parainfluenza Virus 3 Not Detected Not Detected    Parainfluenza Virus 4 Not Detected Not Detected    RSV, PCR Not Detected Not Detected    Bordetella pertussis pcr Not Detected Not Detected    Bordetella parapertussis PCR Not Detected Not Detected    Chlamydophila pneumoniae PCR Not Detected Not Detected    Mycoplasma pneumo by PCR Not Detected Not Detected   Blood Culture - Blood, Arm, Left     Collection Time: 01/15/25  5:50 PM    Specimen: Arm, Left; Blood   Result Value Ref Range    Blood Culture No growth at 5 days    Blood Culture - Blood, Arm, Right    Collection Time: 01/15/25  6:00 PM    Specimen: Arm, Right; Blood   Result Value Ref Range    Blood Culture No growth at 5 days    High Sensitivity Troponin T 1Hr    Collection Time: 01/15/25  6:07 PM    Specimen: Blood   Result Value Ref Range    HS Troponin T 8 <14 ng/L    Troponin T Numeric Delta -3 Abnormal if >/=3 ng/L   Urinalysis With Microscopic If Indicated (No Culture) - Urine, Clean Catch    Collection Time: 01/15/25  6:41 PM    Specimen: Urine, Clean Catch   Result Value Ref Range    Color, UA Yellow Yellow, Straw    Appearance, UA Clear Clear    pH, UA 5.5 5.0 - 8.0    Specific Gravity, UA <=1.005 1.005 - 1.030    Glucose, UA Negative Negative    Ketones, UA Negative Negative    Bilirubin, UA Negative Negative    Blood, UA Negative Negative    Protein, UA Negative Negative    Leuk Esterase, UA Negative Negative    Nitrite, UA Negative Negative    Urobilinogen, UA 0.2 E.U./dL 0.2 - 1.0 E.U./dL   STAT Lactic Acid, Reflex    Collection Time: 01/15/25  7:46 PM    Specimen: Blood   Result Value Ref Range    Lactate 3.3 (C) 0.5 - 2.0 mmol/L   STAT Lactic Acid, Reflex    Collection Time: 01/15/25 11:30 PM    Specimen: Blood   Result Value Ref Range    Lactate 4.0 (C) 0.5 - 2.0 mmol/L   STAT Lactic Acid, Reflex    Collection Time: 01/16/25  2:30 AM    Specimen: Arm, Left; Blood   Result Value Ref Range    Lactate 3.5 (C) 0.5 - 2.0 mmol/L   CBC (No Diff)    Collection Time: 01/16/25  5:36 AM    Specimen: Arm, Left; Blood   Result Value Ref Range    WBC 18.05 (H) 3.40 - 10.80 10*3/mm3    RBC 3.84 3.77 - 5.28 10*6/mm3    Hemoglobin 10.8 (L) 12.0 - 15.9 g/dL    Hematocrit 33.3 (L) 34.0 - 46.6 %    MCV 86.7 79.0 - 97.0 fL    MCH 28.1 26.6 - 33.0 pg    MCHC 32.4 31.5 - 35.7 g/dL    RDW 15.1 12.3 - 15.4 %    RDW-SD 48.6 37.0 - 54.0 fl    MPV 9.9 6.0 -  12.0 fL    Platelets 261 140 - 450 10*3/mm3   Comprehensive Metabolic Panel    Collection Time: 01/16/25  5:36 AM    Specimen: Arm, Left; Blood   Result Value Ref Range    Glucose 150 (H) 65 - 99 mg/dL    BUN 19 8 - 23 mg/dL    Creatinine 0.72 0.57 - 1.00 mg/dL    Sodium 140 136 - 145 mmol/L    Potassium 3.8 3.5 - 5.2 mmol/L    Chloride 105 98 - 107 mmol/L    CO2 25.2 22.0 - 29.0 mmol/L    Calcium 8.3 (L) 8.6 - 10.5 mg/dL    Total Protein 6.5 6.0 - 8.5 g/dL    Albumin 3.2 (L) 3.5 - 5.2 g/dL    ALT (SGPT) 13 1 - 33 U/L    AST (SGOT) 32 1 - 32 U/L    Alkaline Phosphatase 109 39 - 117 U/L    Total Bilirubin 0.2 0.0 - 1.2 mg/dL    Globulin 3.3 gm/dL    A/G Ratio 1.0 g/dL    BUN/Creatinine Ratio 26.4 (H) 7.0 - 25.0    Anion Gap 9.8 5.0 - 15.0 mmol/L    eGFR 94.1 >60.0 mL/min/1.73   Lactic Acid, Plasma    Collection Time: 01/16/25  5:36 AM    Specimen: Arm, Left; Blood   Result Value Ref Range    Lactate 2.1 (C) 0.5 - 2.0 mmol/L   Magnesium    Collection Time: 01/16/25  5:36 AM    Specimen: Arm, Left; Blood   Result Value Ref Range    Magnesium 2.0 1.6 - 2.4 mg/dL   Phosphorus    Collection Time: 01/16/25  5:36 AM    Specimen: Arm, Left; Blood   Result Value Ref Range    Phosphorus 2.2 (L) 2.5 - 4.5 mg/dL   C-reactive Protein    Collection Time: 01/16/25  5:36 AM    Specimen: Arm, Left; Blood   Result Value Ref Range    C-Reactive Protein 39.50 (H) 0.00 - 0.50 mg/dL   Procalcitonin    Collection Time: 01/16/25  5:36 AM    Specimen: Arm, Left; Blood   Result Value Ref Range    Procalcitonin 4.85 (H) 0.00 - 0.25 ng/mL   Manual Differential    Collection Time: 01/16/25  5:36 AM    Specimen: Arm, Left; Blood   Result Value Ref Range    Neutrophil % 72.0 42.7 - 76.0 %    Lymphocyte % 5.0 (L) 19.6 - 45.3 %    Monocyte % 1.0 (L) 5.0 - 12.0 %    Bands %  20.0 (H) 0.0 - 5.0 %    Metamyelocyte % 2.0 (H) 0.0 - 0.0 %    Myelocyte % 0.0 0.0 - 0.0 %    Neutrophils Absolute 16.95 (H) 1.70 - 7.00 10*3/mm3    Lymphocytes Absolute 0.92  0.70 - 3.10 10*3/mm3    Monocytes Absolute 0.18 0.10 - 0.90 10*3/mm3    RBC Morphology Normal Normal    WBC Morphology Normal Normal    Platelet Morphology Normal Normal   CBC Auto Differential    Collection Time: 01/16/25  5:36 AM    Specimen: Arm, Left; Blood   Result Value Ref Range    WBC 18.42 (H) 3.40 - 10.80 10*3/mm3    RBC 3.92 3.77 - 5.28 10*6/mm3    Hemoglobin 10.9 (L) 12.0 - 15.9 g/dL    Hematocrit 34.7 34.0 - 46.6 %    MCV 88.5 79.0 - 97.0 fL    MCH 27.8 26.6 - 33.0 pg    MCHC 31.4 (L) 31.5 - 35.7 g/dL    RDW 15.4 12.3 - 15.4 %    RDW-SD 50.7 37.0 - 54.0 fl    MPV 10.2 6.0 - 12.0 fL    Platelets 280 140 - 450 10*3/mm3   STAT Lactic Acid, Reflex    Collection Time: 01/16/25  8:41 AM    Specimen: Blood   Result Value Ref Range    Lactate 3.8 (C) 0.5 - 2.0 mmol/L   MRSA Screen, PCR (Inpatient) - Swab, Nares    Collection Time: 01/16/25  8:46 AM    Specimen: Nares; Swab   Result Value Ref Range    MRSA PCR Negative Negative   Legionella Antigen, Urine - Urine, Urine, Clean Catch    Collection Time: 01/16/25 11:44 AM    Specimen: Urine, Clean Catch   Result Value Ref Range    LEGIONELLA ANTIGEN, URINE Positive (A) Negative   S. Pneumo Ag Urine or CSF - Urine, Urine, Clean Catch    Collection Time: 01/16/25 11:44 AM    Specimen: Urine, Clean Catch   Result Value Ref Range    Strep Pneumo Ag Positive (A) Negative   Respiratory Culture - Sputum, Cough    Collection Time: 01/16/25 12:25 PM    Specimen: Cough; Sputum   Result Value Ref Range    Respiratory Culture Heavy growth (4+) Streptococcus pneumoniae (A)     Respiratory Culture No Normal Respiratory Christina (A)     Gram Stain Many (4+) WBCs per low power field     Gram Stain Rare (1+) Epithelial cells per low power field     Gram Stain Many (4+) Gram positive cocci        Susceptibility    Streptococcus pneumoniae - JAMES     Ceftriaxone (Meningitis)  Susceptible ug/ml     Ceftriaxone (Non-meningitis)  Susceptible ug/ml     Levofloxacin  Susceptible ug/ml      Penicillin (Meningitis)  Susceptible ug/ml     Penicillin (Non-Meningitis)  Susceptible ug/ml   Blood Gas, Arterial With Co-Ox    Collection Time: 01/16/25 12:58 PM    Specimen: Arterial Blood   Result Value Ref Range    Site Left Radial     Juan's Test N/A     pH, Arterial 7.407 7.350 - 7.450 pH units    pCO2, Arterial 28.0 (L) 35.0 - 45.0 mm Hg    pO2, Arterial 88.8 83.0 - 108.0 mm Hg    HCO3, Arterial 17.6 (L) 20.0 - 26.0 mmol/L    Base Excess, Arterial -6.0 (L) 0.0 - 2.0 mmol/L    Hemoglobin, Blood Gas 10.9 (L) 14 - 18 g/dL    Hematocrit, Blood Gas 33.4 %    Oxyhemoglobin 96.1 94 - 99 %    Methemoglobin 0.50 0.00 - 1.50 %    Carboxyhemoglobin 0.9 0 - 2 %    CO2 Content 18.5 (L) 22 - 33 mmol/L    Temperature 37.0     Barometric Pressure for Blood Gas      Modality Nasal Cannula     FIO2 28 %    Rate 0 Breaths/minute    PIP 0 cmH2O    IPAP 0     EPAP 0     pH, Temp Corrected 7.407 pH Units    pCO2, Temperature Corrected 28.0 (L) 35 - 45 mm Hg    pO2, Temperature Corrected 88.8 83 - 108 mm Hg   Phosphorus    Collection Time: 01/16/25  1:53 PM    Specimen: Blood   Result Value Ref Range    Phosphorus 1.6 (C) 2.5 - 4.5 mg/dL   Basic Metabolic Panel    Collection Time: 01/16/25  1:53 PM    Specimen: Blood   Result Value Ref Range    Glucose 335 (H) 65 - 99 mg/dL    BUN 18 8 - 23 mg/dL    Creatinine 0.75 0.57 - 1.00 mg/dL    Sodium 137 136 - 145 mmol/L    Potassium 3.5 3.5 - 5.2 mmol/L    Chloride 104 98 - 107 mmol/L    CO2 16.8 (L) 22.0 - 29.0 mmol/L    Calcium 8.1 (L) 8.6 - 10.5 mg/dL    BUN/Creatinine Ratio 24.0 7.0 - 25.0    Anion Gap 16.2 (H) 5.0 - 15.0 mmol/L    eGFR 89.6 >60.0 mL/min/1.73   Manual Differential    Collection Time: 01/16/25  1:53 PM    Specimen: Blood   Result Value Ref Range    Neutrophil % 80.0 (H) 42.7 - 76.0 %    Lymphocyte % 11.0 (L) 19.6 - 45.3 %    Monocyte % 1.0 (L) 5.0 - 12.0 %    Bands %  8.0 (H) 0.0 - 5.0 %    Neutrophils Absolute 14.95 (H) 1.70 - 7.00 10*3/mm3    Lymphocytes Absolute  1.87 0.70 - 3.10 10*3/mm3    Monocytes Absolute 0.17 0.10 - 0.90 10*3/mm3    Acanthocytes Slight/1+ None Seen    WBC Morphology Normal Normal    Platelet Morphology Normal Normal   CBC Auto Differential    Collection Time: 01/16/25  1:53 PM    Specimen: Blood   Result Value Ref Range    WBC 16.99 (H) 3.40 - 10.80 10*3/mm3    RBC 3.52 (L) 3.77 - 5.28 10*6/mm3    Hemoglobin 9.9 (L) 12.0 - 15.9 g/dL    Hematocrit 32.6 (L) 34.0 - 46.6 %    MCV 92.6 79.0 - 97.0 fL    MCH 28.1 26.6 - 33.0 pg    MCHC 30.4 (L) 31.5 - 35.7 g/dL    RDW 15.7 (H) 12.3 - 15.4 %    RDW-SD 54.1 (H) 37.0 - 54.0 fl    MPV 10.3 6.0 - 12.0 fL    Platelets 236 140 - 450 10*3/mm3   Lactic Acid, Plasma    Collection Time: 01/16/25  1:53 PM    Specimen: Blood   Result Value Ref Range    Lactate 5.8 (C) 0.5 - 2.0 mmol/L   Lactic Acid, Plasma    Collection Time: 01/16/25  5:51 PM    Specimen: Blood   Result Value Ref Range    Lactate 5.2 (C) 0.5 - 2.0 mmol/L   Hemoglobin A1c    Collection Time: 01/16/25  5:51 PM    Specimen: Blood   Result Value Ref Range    Hemoglobin A1C 5.90 (H) 4.80 - 5.60 %   CBC Auto Differential    Collection Time: 01/17/25  3:50 AM    Specimen: Blood   Result Value Ref Range    WBC 12.60 (H) 3.40 - 10.80 10*3/mm3    RBC 3.49 (L) 3.77 - 5.28 10*6/mm3    Hemoglobin 9.9 (L) 12.0 - 15.9 g/dL    Hematocrit 30.8 (L) 34.0 - 46.6 %    MCV 88.3 79.0 - 97.0 fL    MCH 28.4 26.6 - 33.0 pg    MCHC 32.1 31.5 - 35.7 g/dL    RDW 15.8 (H) 12.3 - 15.4 %    RDW-SD 51.1 37.0 - 54.0 fl    MPV 10.1 6.0 - 12.0 fL    Platelets 246 140 - 450 10*3/mm3   Basic Metabolic Panel    Collection Time: 01/17/25  3:50 AM    Specimen: Blood   Result Value Ref Range    Glucose 165 (H) 65 - 99 mg/dL    BUN 15 8 - 23 mg/dL    Creatinine 0.55 (L) 0.57 - 1.00 mg/dL    Sodium 139 136 - 145 mmol/L    Potassium 4.6 3.5 - 5.2 mmol/L    Chloride 108 (H) 98 - 107 mmol/L    CO2 21.0 (L) 22.0 - 29.0 mmol/L    Calcium 8.4 (L) 8.6 - 10.5 mg/dL    BUN/Creatinine Ratio 27.3 (H) 7.0 -  25.0    Anion Gap 10.0 5.0 - 15.0 mmol/L    eGFR 103.1 >60.0 mL/min/1.73   Phosphorus    Collection Time: 01/17/25  3:50 AM    Specimen: Blood   Result Value Ref Range    Phosphorus 3.0 2.5 - 4.5 mg/dL   Manual Differential    Collection Time: 01/17/25  3:50 AM    Specimen: Blood   Result Value Ref Range    Neutrophil % 61.0 42.7 - 76.0 %    Lymphocyte % 2.0 (L) 19.6 - 45.3 %    Monocyte % 1.0 (L) 5.0 - 12.0 %    Eosinophil % 0.0 (L) 0.3 - 6.2 %    Basophil % 0.0 0.0 - 1.5 %    Bands %  36.0 (H) 0.0 - 5.0 %    Neutrophils Absolute 12.22 (H) 1.70 - 7.00 10*3/mm3    Lymphocytes Absolute 0.25 (L) 0.70 - 3.10 10*3/mm3    Monocytes Absolute 0.13 0.10 - 0.90 10*3/mm3    Eosinophils Absolute 0.00 0.00 - 0.40 10*3/mm3    Basophils Absolute 0.00 0.00 - 0.20 10*3/mm3    nRBC 0.0 0.0 - 0.2 /100 WBC    Justin Cells Mod/2+ None Seen    WBC Morphology Normal Normal    Platelet Morphology Normal Normal       Physical Exam  Vitals reviewed.   Constitutional:       Appearance: She is well-developed.   HENT:      Head: Normocephalic and atraumatic.   Cardiovascular:      Rate and Rhythm: Normal rate and regular rhythm.      Heart sounds: Normal heart sounds.   Pulmonary:      Effort: Pulmonary effort is normal.      Breath sounds: Normal breath sounds.   Skin:     General: Skin is warm and dry.   Neurological:      Mental Status: She is alert and oriented to person, place, and time.   Psychiatric:         Behavior: Behavior normal.         Assessment & Plan   Problems Addressed this Visit          Cardiac and Vasculature    Essential hypertension    Relevant Orders    Comprehensive Metabolic Panel       Endocrine and Metabolic    Class 1 obesity due to excess calories with serious comorbidity and body mass index (BMI) of 33.0 to 33.9 in adult    Prediabetes    Relevant Orders    Comprehensive Metabolic Panel     Other Visit Diagnoses       Pneumonia due to infectious organism, unspecified laterality, unspecified part of lung    -   Primary    Anemia, unspecified type        Relevant Orders    CBC & Differential    Acquired hypophosphatemia        Relevant Orders    Phosphorus          Diagnoses         Codes Comments    Pneumonia due to infectious organism, unspecified laterality, unspecified part of lung    -  Primary ICD-10-CM: J18.9  ICD-9-CM: 486     Essential hypertension     ICD-10-CM: I10  ICD-9-CM: 401.9     Prediabetes     ICD-10-CM: R73.03  ICD-9-CM: 790.29     Anemia, unspecified type     ICD-10-CM: D64.9  ICD-9-CM: 285.9     Acquired hypophosphatemia     ICD-10-CM: E83.39  ICD-9-CM: 275.3     Class 1 obesity due to excess calories with serious comorbidity and body mass index (BMI) of 33.0 to 33.9 in adult     ICD-10-CM: E66.811, E66.09, Z68.33  ICD-9-CM: 278.00, V85.33         Patient instructed to check blood pressure daily, record, and bring to next visit. If the readings run 140/90 or greater, the patient is to call my office or return sooner for evaluation. Pt advised to eat a heart healthy diet and get regular aerobic exercise.    Discussed need for weight management.  I recommend they use a weight loss mayra on their phone, eat no more than [id number] pilar/day, or use intermittent fasting and exercise 30 to 60 minutes 3 times a week.  Discussed weight loss with diet, recommend Weight Watchers or Mediterranean Diet, but stated that whatever method works for this patient is best. The patient agrees with all recommendations at this time. I have discussed a weight loss plan to be determined by this patient.   Will obtain routine testing today and make further recommendations pending results. All results are automatically released to [employer] immediately when they return whether they have been reviewed or not. The patient will be notified about the results, regardless of the findings. If they have not been contacted by the office within 2 weeks after the test has been performed, I want them to contact us to learn about the  results.    Patient was encouraged to keep me informed of any acute changes, lack of improvement, or any new concerning symptoms.  If patient becomes concerned, or has any worsening symptoms, they are to report either here, urgent treatment, or emergency room. Plan of care discussed with pt. They verbalized understanding and agreement.     Return visit in RV- 1 month    Counseling provided on weight management, hypertension, and diabetes.    Rehan Luong James, APRN   1/22/2025   14:56 EST     Please note that portions of this document were completed with a voice recognition program.     At [facility], we believe that sharing information builds trust and better relationships. You are receiving this note because you are receiving care at [facility] or have recently visited. It is possible you will see health information before a provider has talked with you about it. This kind of information can be easy to misunderstand as it is a medical document. It is intended as [id number] communication. It is written in medical language and may contain abbreviations or verbiage that are unfamiliar. It may appear blunt or direct. Medical documents are intended to carry relevant information, facts as evident, and the clinical opinion of the practitioner.  To help you fully understand what it means for your health, we urge you to discuss this note with your provider.

## 2025-01-23 DIAGNOSIS — I10 ESSENTIAL HYPERTENSION: Primary | ICD-10-CM

## 2025-01-23 LAB
ALBUMIN SERPL-MCNC: 3.3 G/DL (ref 3.5–5.2)
ALBUMIN/GLOB SERPL: 0.9 G/DL
ALP SERPL-CCNC: 108 U/L (ref 39–117)
ALT SERPL W P-5'-P-CCNC: 60 U/L (ref 1–33)
ANION GAP SERPL CALCULATED.3IONS-SCNC: 12.1 MMOL/L (ref 5–15)
AST SERPL-CCNC: 79 U/L (ref 1–32)
BILIRUB SERPL-MCNC: 0.3 MG/DL (ref 0–1.2)
BUN SERPL-MCNC: 13 MG/DL (ref 8–23)
BUN/CREAT SERPL: 15.1 (ref 7–25)
CALCIUM SPEC-SCNC: 9 MG/DL (ref 8.6–10.5)
CHLORIDE SERPL-SCNC: 100 MMOL/L (ref 98–107)
CO2 SERPL-SCNC: 24.9 MMOL/L (ref 22–29)
CREAT SERPL-MCNC: 0.86 MG/DL (ref 0.57–1)
EGFRCR SERPLBLD CKD-EPI 2021: 76 ML/MIN/1.73
GLOBULIN UR ELPH-MCNC: 3.7 GM/DL
GLUCOSE SERPL-MCNC: 78 MG/DL (ref 65–99)
PHOSPHATE SERPL-MCNC: 2.9 MG/DL (ref 2.5–4.5)
POTASSIUM SERPL-SCNC: 5.3 MMOL/L (ref 3.5–5.2)
PROT SERPL-MCNC: 7 G/DL (ref 6–8.5)
SODIUM SERPL-SCNC: 137 MMOL/L (ref 136–145)

## 2025-01-23 NOTE — PROGRESS NOTES
Let her know her potassium is very slightly elevated.  We will continue to monitor this and I would like to check this again in about 2 weeks.  Her liver function tests are slightly elevated probably related to her illness and we will also check this in about 2 weeks.  Her white blood cells which reflect an infection are improving and her phosphorus is in the normal range.  I have ordered a lab test to be completed in 2 weeks.

## 2025-01-28 ENCOUNTER — TELEPHONE (OUTPATIENT)
Age: 65
End: 2025-01-28
Payer: COMMERCIAL

## 2025-01-28 NOTE — DISCHARGE SUMMARY
Patient was placed in observation for bilateral pneumonia and influenza.  Patient had continued tachypnea and tachycardia.  Patient had bilateral lower lobe rhonchi.  Patient was admitted to [facility] for further evaluation treatment.

## 2025-01-28 NOTE — DISCHARGE SUMMARY
Patient was placed in observation for bilateral pneumonia and influenza.  Patient had continued tachypnea and tachycardia.  Patient had bilateral lower lobe rhonchi.  Patient was admitted to [facility] for further evaluation treatment.

## 2025-02-14 ENCOUNTER — LAB (OUTPATIENT)
Age: 65
End: 2025-02-14
Payer: COMMERCIAL

## 2025-02-14 DIAGNOSIS — I10 ESSENTIAL HYPERTENSION: ICD-10-CM

## 2025-02-14 LAB
ALBUMIN SERPL-MCNC: 3.7 G/DL (ref 3.5–5.2)
ALBUMIN/GLOB SERPL: 1 G/DL
ALP SERPL-CCNC: 110 U/L (ref 39–117)
ALT SERPL W P-5'-P-CCNC: 21 U/L (ref 1–33)
ANION GAP SERPL CALCULATED.3IONS-SCNC: 9.1 MMOL/L (ref 5–15)
AST SERPL-CCNC: 37 U/L (ref 1–32)
BILIRUB SERPL-MCNC: <0.2 MG/DL (ref 0–1.2)
BUN SERPL-MCNC: 13 MG/DL (ref 8–23)
BUN/CREAT SERPL: 15.9 (ref 7–25)
CALCIUM SPEC-SCNC: 9.7 MG/DL (ref 8.6–10.5)
CHLORIDE SERPL-SCNC: 103 MMOL/L (ref 98–107)
CO2 SERPL-SCNC: 23.9 MMOL/L (ref 22–29)
CREAT SERPL-MCNC: 0.82 MG/DL (ref 0.57–1)
EGFRCR SERPLBLD CKD-EPI 2021: 80.5 ML/MIN/1.73
GLOBULIN UR ELPH-MCNC: 3.8 GM/DL
GLUCOSE SERPL-MCNC: 163 MG/DL (ref 65–99)
POTASSIUM SERPL-SCNC: 4.3 MMOL/L (ref 3.5–5.2)
PROT SERPL-MCNC: 7.5 G/DL (ref 6–8.5)
SODIUM SERPL-SCNC: 136 MMOL/L (ref 136–145)

## 2025-02-14 PROCEDURE — 36415 COLL VENOUS BLD VENIPUNCTURE: CPT | Performed by: NURSE PRACTITIONER

## 2025-02-14 PROCEDURE — 80053 COMPREHEN METABOLIC PANEL: CPT | Performed by: NURSE PRACTITIONER

## 2025-02-16 DIAGNOSIS — K21.9 GASTROESOPHAGEAL REFLUX DISEASE, UNSPECIFIED WHETHER ESOPHAGITIS PRESENT: ICD-10-CM

## 2025-02-16 DIAGNOSIS — M19.042 OSTEOARTHRITIS OF LEFT HAND, UNSPECIFIED OSTEOARTHRITIS TYPE: ICD-10-CM

## 2025-02-17 RX ORDER — MELOXICAM 7.5 MG/1
7.5 TABLET ORAL DAILY
Qty: 90 TABLET | Refills: 2 | Status: SHIPPED | OUTPATIENT
Start: 2025-02-17

## 2025-02-17 RX ORDER — OMEPRAZOLE 40 MG/1
40 CAPSULE, DELAYED RELEASE ORAL DAILY
Qty: 90 CAPSULE | Refills: 2 | Status: SHIPPED | OUTPATIENT
Start: 2025-02-17

## 2025-02-17 NOTE — PROGRESS NOTES
Her glucose remains elevated and her liver function test have improved.  Will continue to monitor these at her next visit.

## 2025-02-17 NOTE — TELEPHONE ENCOUNTER
Rx Refill Note  Requested Prescriptions     Pending Prescriptions Disp Refills    omeprazole (priLOSEC) 40 MG capsule 90 capsule 3     Sig: Take 1 capsule by mouth Daily.    meloxicam (MOBIC) 7.5 MG tablet 90 tablet 3     Sig: Take 1 tablet by mouth Daily.      Last office visit with prescribing clinician: 1/22/2025   Last telemedicine visit with prescribing clinician: Visit date not found   Next office visit with prescribing clinician: 2/24/2025     Lizbeth Eckert MA  02/17/25, 14:31 EST    Rx sent

## 2025-02-18 ENCOUNTER — TELEPHONE (OUTPATIENT)
Age: 65
End: 2025-02-18
Payer: COMMERCIAL

## 2025-02-18 NOTE — TELEPHONE ENCOUNTER
CALLED AND LVM USING     RELAY      ----- Message from Rehan Ramirez  Her glucose remains elevated and her liver function test have improved.  Will continue to monitor these at her next visit.

## 2025-02-21 LAB
QT INTERVAL: 330 MS
QTC INTERVAL: 408 MS

## 2025-02-21 NOTE — TELEPHONE ENCOUNTER
3rd attempt. Sending out a letter with results and closing encounter.    CALLED AND LVM USING      RELAY        ----- Message from Rehan Ramirez  Her glucose remains elevated and her liver function test have improved.  Will continue to monitor these at her next visit.